# Patient Record
Sex: FEMALE | Race: WHITE | NOT HISPANIC OR LATINO | Employment: FULL TIME | URBAN - METROPOLITAN AREA
[De-identification: names, ages, dates, MRNs, and addresses within clinical notes are randomized per-mention and may not be internally consistent; named-entity substitution may affect disease eponyms.]

---

## 2017-01-26 ENCOUNTER — OFFICE VISIT (OUTPATIENT)
Dept: OBSTETRICS AND GYNECOLOGY | Facility: CLINIC | Age: 37
End: 2017-01-26
Payer: COMMERCIAL

## 2017-01-26 VITALS
BODY MASS INDEX: 25.34 KG/M2 | HEIGHT: 62 IN | WEIGHT: 137.69 LBS | SYSTOLIC BLOOD PRESSURE: 120 MMHG | DIASTOLIC BLOOD PRESSURE: 82 MMHG

## 2017-01-26 DIAGNOSIS — Z12.4 SCREENING FOR MALIGNANT NEOPLASM OF THE CERVIX: Primary | ICD-10-CM

## 2017-01-26 DIAGNOSIS — Z01.419 ENCOUNTER FOR GYNECOLOGICAL EXAMINATION: ICD-10-CM

## 2017-01-26 PROCEDURE — 99999 PR PBB SHADOW E&M-EST. PATIENT-LVL II: CPT | Mod: PBBFAC,,, | Performed by: OBSTETRICS & GYNECOLOGY

## 2017-01-26 PROCEDURE — 99395 PREV VISIT EST AGE 18-39: CPT | Mod: S$GLB,,, | Performed by: OBSTETRICS & GYNECOLOGY

## 2017-01-26 PROCEDURE — 87624 HPV HI-RISK TYP POOLED RSLT: CPT

## 2017-01-26 PROCEDURE — 88175 CYTOPATH C/V AUTO FLUID REDO: CPT

## 2017-01-26 NOTE — PROGRESS NOTES
"CC: Well woman exam    Karolyn Campbell is a 36 y.o. female  presents for a well woman exam.  She is established.  LMP: Patient's last menstrual period was 01/10/2017..   Patient without complaints.  Cycles are monthly and regular.  Using condoms for birth control  considering a vasectomy     Health Maintenance   Topic Date Due    Lipid Panel  1979    TETANUS VACCINE  1997    Pap Smear  2000    Influenza Vaccine  2016         Past Medical History   Diagnosis Date    Abnormal Pap smear of cervix      prior to being a pt     Allergy     Anxiety     Anxiety     HPV (human papilloma virus) infection     Hyperlipemia     Migraine     .  Having normal regular monthly periods.  Using condoms for birth control.    Past Surgical History   Procedure Laterality Date    Breast surgery      Rhinoplasty      Replacement of  breast implant        due to rupture      section         OB History    Para Term  AB SAB TAB Ectopic Multiple Living   2 2        2      # Outcome Date GA Lbr Germain/2nd Weight Sex Delivery Anes PTL Lv   2 Para     F CS-LTranv   Y   1 Para     M CS-LTranv   Y          Family History   Problem Relation Age of Onset    Adopted: Yes       Social History   Substance Use Topics    Smoking status: Former Smoker    Smokeless tobacco: None    Alcohol use Yes       Visit Vitals    /82    Ht 5' 2" (1.575 m)    Wt 62.5 kg (137 lb 10.8 oz)    LMP 01/10/2017    BMI 25.18 kg/m2         ROS:  GENERAL: Denies weight gain or weight loss. Feeling well overall.   SKIN: Denies rash or lesions.   HEAD: Denies head injury or headache.   NODES: Denies enlarged lymph nodes.   CHEST: Denies chest pain or shortness of breath.   CARDIOVASCULAR: Denies palpitations or left sided chest pain.   ABDOMEN: No abdominal pain, constipation, diarrhea, nausea, vomiting or rectal bleeding.   URINARY: No frequency, dysuria, hematuria, " or burning on urination.  REPRODUCTIVE: See HPI.   BREASTS: The patient performs breast self-examination and denies pain, lumps, or nipple discharge.   HEMATOLOGIC: No easy bruisability or excessive bleeding.  MUSCULOSKELETAL: Denies joint pain or swelling.   NEUROLOGIC: Denies syncope or weakness.   PSYCHIATRIC: Denies depression, anxiety or mood swings.    Physical Exam:    APPEARANCE: Well nourished, well developed, in no acute distress.  AFFECT: WNL, alert and oriented x 3  SKIN: No acne or hirsutism  ABDOMEN: Soft.  No tenderness or masses.  No hepatosplenomegaly.  No hernias.  BREASTS: Symmetrical, no skin changes or visible lesions.  No palpable masses, nipple discharge bilaterally.  PELVIC: Normal external genitalia without lesions.  Normal hair distribution.  Adequate perineal body, normal urethral meatus.  Vagina moist and well rugated without lesions or discharge.  Cervix pink, without lesions, discharge or tenderness.  No significant cystocele or rectocele.  Bimanual exam shows uterus to be normal size, regular, mobile and nontender.  Adnexa without masses or tenderness.    EXTREMITIES: No edema.    ASSESSMENT AND PLAN  1. Screening for malignant neoplasm of the cervix  Liquid-based pap smear, screening    HPV DNA probe, amplified   2. Encounter for gynecological examination   Pap smear done.         Patient was counseled today on A.C.S. Pap guidelines and recommendations for yearly pelvic exams, mammograms and monthly self breast exams; to see her PCP for other health maintenance.     Return in about 1 year (around 1/26/2018).

## 2017-01-26 NOTE — MR AVS SNAPSHOT
"    [facility]  [address]  [place] [address]  Phone: [phone]  Fax: [phone]                  Karolyn Campbell   2017 2:45 PM   Office Visit    Description:  Female : 1980   Provider:  Tobias Lubin MD   Department:  [facility]           Reason for Visit     Well Woman           Diagnoses this Visit        Comments    Screening for malignant neoplasm of the cervix    -  Primary     Encounter for gynecological examination                To Do List           Goals (5 Years of Data)     None      Follow-Up and Disposition     Return in about 1 year (around 2018).    Follow-up and Disposition History      [facility] On [facility]     [facility] On [facility] Care [facility] -  Assistance  Registered nurses in the [facility] provide clinical advisement, health education, appointment booking, and other advisory services.  Call for this free service at [phone].             Medications           Message regarding Medications     Verify the changes and/or additions to your medication regime listed below are the same as discussed with your clinician today.  If any of these changes or additions are incorrect, please notify your healthcare provider.        STOP taking these medications     buPROPion (WELLBUTRIN XL) 150 MG TB24 tablet Take 1 tablet (150 mg total) by mouth once daily.           Verify that the below list of medications is an accurate representation of the medications you are currently taking.  If none reported, the list may be blank. If incorrect, please contact your healthcare provider. Carry this list with you in case of emergency.           Current Medications     sertraline (ZOLOFT) 50 MG tablet Take 1 tablet (50 mg total) by mouth every evening.           Clinical Reference Information           Vital Signs - Last Recorded  Most recent update: 2017  3:13 PM by Bettye Flores MA    BP Ht Wt LMP BMI    120/82 5' 2" (1.575 m) " 62.5 kg (137 lb 10.8 oz) 01/10/2017 25.18 kg/m2      Blood Pressure          Most Recent Value    BP  120/82      Allergies as of 1/26/2017     Wellbutrin [Bupropion Hcl]      Immunizations Administered on Date of Encounter - 1/26/2017     None      Orders Placed During Today's Visit      Normal Orders This Visit    HPV DNA probe, amplified     Liquid-based pap smear, screening

## 2017-02-03 NOTE — PROGRESS NOTES
Pap is normal however HPV test is still pending.  The HPV portion of this test may take up to 2 weeks to return.  Resulted via portal

## 2017-02-06 LAB — HUMAN PAPILLOMAVIRUS (HPV): NOT DETECTED

## 2017-07-03 ENCOUNTER — OFFICE VISIT (OUTPATIENT)
Dept: DERMATOLOGY | Facility: CLINIC | Age: 37
End: 2017-07-03
Payer: COMMERCIAL

## 2017-07-03 DIAGNOSIS — L81.4 LENTIGO: ICD-10-CM

## 2017-07-03 DIAGNOSIS — L82.1 SK (SEBORRHEIC KERATOSIS): ICD-10-CM

## 2017-07-03 DIAGNOSIS — D48.9 NEOPLASM OF UNCERTAIN BEHAVIOR: ICD-10-CM

## 2017-07-03 DIAGNOSIS — L21.9 SEBORRHEIC DERMATITIS, UNSPECIFIED: Primary | ICD-10-CM

## 2017-07-03 DIAGNOSIS — D22.9 NEVUS: ICD-10-CM

## 2017-07-03 PROCEDURE — 99999 PR PBB SHADOW E&M-EST. PATIENT-LVL II: CPT | Mod: PBBFAC,,, | Performed by: DERMATOLOGY

## 2017-07-03 PROCEDURE — 99203 OFFICE O/P NEW LOW 30 MIN: CPT | Mod: 25,S$GLB,, | Performed by: DERMATOLOGY

## 2017-07-03 PROCEDURE — 88305 TISSUE EXAM BY PATHOLOGIST: CPT | Performed by: PATHOLOGY

## 2017-07-03 PROCEDURE — 11100 PR BIOPSY OF SKIN LESION: CPT | Mod: S$GLB,,, | Performed by: DERMATOLOGY

## 2017-07-03 RX ORDER — KETOCONAZOLE 20 MG/ML
SHAMPOO, SUSPENSION TOPICAL
Qty: 120 ML | Refills: 5 | Status: SHIPPED | OUTPATIENT
Start: 2017-07-03 | End: 2018-04-04

## 2017-07-03 RX ORDER — FLUOCINONIDE TOPICAL SOLUTION USP, 0.05% 0.5 MG/ML
SOLUTION TOPICAL
Qty: 60 ML | Refills: 3 | Status: SHIPPED | OUTPATIENT
Start: 2017-07-03 | End: 2018-04-04

## 2017-07-03 NOTE — PROGRESS NOTES
"  Subjective:       Patient ID:  Karolyn Campbell is a 36 y.o. female who presents for   Chief Complaint   Patient presents with    Skin Check     Patient is here today for a "mole" check.   Pt has a history of  significant sun exposure in the past.   Pt recalls several blistering sunburns in the past- yes  Pt has history of tanning bed use- yes, not currently  Pt has  had moles removed in the past- yes, benign  Pt has history of melanoma in first degree relatives-  Adopted    Pt c/o moles all over and is concerned about them bc of her tanning bed use. Moles started after she had children. Now minimal sun exposure. Pt has mole on her back that is large. Not bleeding or tender. No tx.         Review of Systems   Constitutional: Negative for fever, chills, weight loss, weight gain, fatigue, night sweats and malaise.   Skin: Positive for daily sunscreen use. Negative for activity-related sunscreen use and recent sunburn.   Hematologic/Lymphatic: Does not bruise/bleed easily.        Objective:    Physical Exam   Constitutional: She appears well-developed and well-nourished. No distress.   Neurological: She is alert and oriented to person, place, and time. She is not disoriented.   Psychiatric: She has a normal mood and affect.   Skin:   Areas Examined (abnormalities noted in diagram):   Scalp / Hair Palpated and Inspected  Head / Face Inspection Performed  Neck Inspection Performed  Chest / Axilla Inspection Performed  Abdomen Inspection Performed  Genitals / Buttocks / Groin Inspection Performed  Back Inspection Performed  RUE Inspected  LUE Inspection Performed  RLE Inspected  LLE Inspection Performed  Nails and Digits Inspection Performed                   Diagram Legend     Erythematous scaling macule/papule c/w actinic keratosis       Vascular papule c/w angioma      Pigmented verrucoid papule/plaque c/w seborrheic keratosis      Yellow umbilicated papule c/w sebaceous hyperplasia      Irregularly shaped tan macule " c/w lentigo     1-2 mm smooth white papules consistent with Milia      Movable subcutaneous cyst with punctum c/w epidermal inclusion cyst      Subcutaneous movable cyst c/w pilar cyst      Firm pink to brown papule c/w dermatofibroma      Pedunculated fleshy papule(s) c/w skin tag(s)      Evenly pigmented macule c/w junctional nevus     Mildly variegated pigmented, slightly irregular-bordered macule c/w mildly atypical nevus      Flesh colored to evenly pigmented papule c/w intradermal nevus       Pink pearly papule/plaque c/w basal cell carcinoma      Erythematous hyperkeratotic cursted plaque c/w SCC      Surgical scar with no sign of skin cancer recurrence      Open and closed comedones      Inflammatory papules and pustules      Verrucoid papule consistent consistent with wart     Erythematous eczematous patches and plaques     Dystrophic onycholytic nail with subungual debris c/w onychomycosis     Umbilicated papule    Erythematous-base heme-crusted tan verrucoid plaque consistent with inflamed seborrheic keratosis     Erythematous Silvery Scaling Plaque c/w Psoriasis     See annotation      Assessment / Plan:      Pathology Orders:      Normal Orders This Visit    Tissue Specimen To Pathology, Dermatology     Questions:    Directional Terms:  Other(comment)    Clinical information:  r/o inflamed nevus    Specific Site:  left upper back        Seborrheic dermatitis, unspecified  -     fluocinonide (LIDEX) 0.05 % external solution; AAA scalp qd - bid prn pruritus  Dispense: 60 mL; Refill: 3  -     ketoconazole (NIZORAL) 2 % shampoo; Wash hair with medicated shampoo at least 2x/week - let sit on scalp at least 5 minutes prior to rinsing  Dispense: 120 mL; Refill: 5    SK (seborrheic keratosis)  These are benign inherited growths without a malignant potential. Reassurance given to patient. No treatment is necessary.     Lentigo  This is a benign hyperpigmented sun induced lesion. Daily sun protection will reduce  the number of new lesions. Treatment of these benign lesions are considered cosmetic.  The nature of sun-induced photo-aging and skin cancers is discussed.  Sun avoidance, protective clothing, and the use of 30-SPF sunscreens is advised. Observe closely for skin damage/changes, and call if such occurs.    Nevus  Discussed ABCDE's of nevi.  Monitor for new mole or moles that are becoming bigger, darker, irritated, or developing irregular borders. Brochure provided.    Neoplasm of uncertain behavior  Shave biopsy procedure note:    Shave biopsy performed after verbal consent including risk of infection, scar, recurrence, need for additional treatment of site. Area prepped with alcohol, anesthetized with approximately 1.0cc of 1% lidocaine with epinephrine. Lesional tissue shaved with razor blade. Hemostasis achieved with application of aluminum chloride followed by hyfrecation. No complications. Dressing applied. Wound care explained.      -     Tissue Specimen To Pathology, Dermatology    Total body skin examination performed today including at least 12 points as noted in physical examination. No lesions suspicious for malignancy noted.    Pt intersted in having 3mm mole removed from right cheek.  Discussed would do punch bx          Return in about 1 year (around 7/3/2018) for prn bx report. recheck nevus mid upper gluteal cleft

## 2017-07-03 NOTE — LETTER
July 3, 2017      Jack Herrera Jr., MD  [address] [facility] [place] [address]           [facility]  [address] [facility] [place] [address]  Phone: [phone]  Fax: [phone]          Patient: Karolny Campbell   MR Number: [id number]   YOB: 1980   Date of Visit: 7/3/2017       Dear Dr. Jack Herrera Jr.:    Thank you for referring Karolyn Campbell to me for evaluation. Attached you will find relevant portions of my assessment and plan of care.    If you have questions, please do not hesitate to call me. I look forward to following Karolyn Campbell along with you.    Sincerely,    Tamara Mary MD    Enclosure  CC:  No Recipients    If you would like to receive this communication electronically, please contact [email or web address] or [phone] to request more information on [employer] access.    For providers and/or their staff who would like to refer a patient to [facility], please contact us through our one-stop-shop provider referral line, [facility], at [phone].    If you feel you have received this communication in error or would no longer like to receive these types of communications, please e-mail [email or web address]

## 2017-07-07 ENCOUNTER — PATIENT MESSAGE (OUTPATIENT)
Dept: DERMATOLOGY | Facility: CLINIC | Age: 37
End: 2017-07-07

## 2017-12-27 ENCOUNTER — OFFICE VISIT (OUTPATIENT)
Dept: DERMATOLOGY | Facility: CLINIC | Age: 37
End: 2017-12-27
Payer: COMMERCIAL

## 2017-12-27 DIAGNOSIS — D22.9 NEVUS: ICD-10-CM

## 2017-12-27 DIAGNOSIS — L81.4 LENTIGO: ICD-10-CM

## 2017-12-27 DIAGNOSIS — L70.0 ACNE VULGARIS: Primary | ICD-10-CM

## 2017-12-27 PROCEDURE — 99999 PR PBB SHADOW E&M-EST. PATIENT-LVL II: CPT | Mod: PBBFAC,,, | Performed by: DERMATOLOGY

## 2017-12-27 PROCEDURE — 99213 OFFICE O/P EST LOW 20 MIN: CPT | Mod: S$GLB,,, | Performed by: DERMATOLOGY

## 2017-12-27 RX ORDER — TRETINOIN 0.25 MG/G
CREAM TOPICAL
Qty: 45 G | Refills: 6 | Status: SHIPPED | OUTPATIENT
Start: 2017-12-27 | End: 2019-07-18 | Stop reason: SINTOL

## 2017-12-27 RX ORDER — DAPSONE 75 MG/G
GEL TOPICAL
Qty: 60 G | Refills: 2 | Status: SHIPPED | OUTPATIENT
Start: 2017-12-27 | End: 2018-04-04

## 2017-12-27 NOTE — PROGRESS NOTES
Subjective:       Patient ID:  Karolyn Campbell is a 37 y.o. female who presents for   Chief Complaint   Patient presents with    Acne     Pt c/o acne on face x a few years. No prev tx. Worse with menses.    Pt also request a UBSE. Has many moles and last visit had mole on upper buttock to be rechecked.  No changes noted per patient       Acne         Review of Systems   HENT: Negative for nosebleeds and headaches.    Gastrointestinal: Negative for diarrhea and Sensitivity to oral antibiotics.   Genitourinary: Negative for irregular periods.   Musculoskeletal: Negative for arthralgias.   Skin: Positive for daily sunscreen use and activity-related sunscreen use. Negative for recent sunburn.   Neurological: Negative for headaches.   Psychiatric/Behavioral: Negative for depressed mood.        Objective:    Physical Exam   Constitutional: She appears well-developed and well-nourished. No distress.   Neurological: She is alert and oriented to person, place, and time. She is not disoriented.   Psychiatric: She has a normal mood and affect.   Skin:   Areas Examined (abnormalities noted in diagram):   Scalp / Hair Palpated and Inspected  Head / Face Inspection Performed  Neck Inspection Performed  Chest / Axilla Inspection Performed  Abdomen Inspection Performed  Back Inspection Performed  RUE Inspected  LUE Inspection Performed  Nails and Digits Inspection Performed                   Diagram Legend     Erythematous scaling macule/papule c/w actinic keratosis       Vascular papule c/w angioma      Pigmented verrucoid papule/plaque c/w seborrheic keratosis      Yellow umbilicated papule c/w sebaceous hyperplasia      Irregularly shaped tan macule c/w lentigo     1-2 mm smooth white papules consistent with Milia      Movable subcutaneous cyst with punctum c/w epidermal inclusion cyst      Subcutaneous movable cyst c/w pilar cyst      Firm pink to brown papule c/w dermatofibroma      Pedunculated fleshy papule(s) c/w skin  tag(s)      Evenly pigmented macule c/w junctional nevus     Mildly variegated pigmented, slightly irregular-bordered macule c/w mildly atypical nevus      Flesh colored to evenly pigmented papule c/w intradermal nevus       Pink pearly papule/plaque c/w basal cell carcinoma      Erythematous hyperkeratotic cursted plaque c/w SCC      Surgical scar with no sign of skin cancer recurrence      Open and closed comedones      Inflammatory papules and pustules      Verrucoid papule consistent consistent with wart     Erythematous eczematous patches and plaques     Dystrophic onycholytic nail with subungual debris c/w onychomycosis     Umbilicated papule    Erythematous-base heme-crusted tan verrucoid plaque consistent with inflamed seborrheic keratosis     Erythematous Silvery Scaling Plaque c/w Psoriasis     See annotation      Assessment / Plan:        Acne vulgaris  SKin medica cleanser  -     dapsone (ACZONE) 7.5 % GlwP; Apply to affected area qam  Dispense: 60 g; Refill: 2  -     tretinoin (RETIN-A) 0.025 % cream; Apply thin film to face qhs then moisturize  Dispense: 45 g; Refill: 6    Lentigo  This is a benign hyperpigmented sun induced lesion. Daily sun protection will reduce the number of new lesions. Treatment of these benign lesions are considered cosmetic.  The nature of sun-induced photo-aging and skin cancers is discussed.  Sun avoidance, protective clothing, and the use of 30-SPF sunscreens is advised. Observe closely for skin damage/changes, and call if such occurs.    Nevus    Discussed ABCDE's of nevi.  Monitor for new mole or moles that are becoming bigger, darker, irritated, or developing irregular borders. Brochure provided.      Upper body skin examination performed today including at least 6 points as noted in physical examination. No lesions suspicious for malignancy noted.           Return in about 1 year (around 12/27/2018).

## 2018-01-05 ENCOUNTER — PATIENT MESSAGE (OUTPATIENT)
Dept: FAMILY MEDICINE | Facility: CLINIC | Age: 38
End: 2018-01-05

## 2018-01-08 ENCOUNTER — PATIENT MESSAGE (OUTPATIENT)
Dept: FAMILY MEDICINE | Facility: CLINIC | Age: 38
End: 2018-01-08

## 2018-01-08 RX ORDER — SERTRALINE HYDROCHLORIDE 50 MG/1
50 TABLET, FILM COATED ORAL NIGHTLY
Qty: 30 TABLET | Refills: 11 | Status: SHIPPED | OUTPATIENT
Start: 2018-01-08 | End: 2018-04-04

## 2018-04-04 ENCOUNTER — OFFICE VISIT (OUTPATIENT)
Dept: OBSTETRICS AND GYNECOLOGY | Facility: CLINIC | Age: 38
End: 2018-04-04
Attending: OBSTETRICS & GYNECOLOGY
Payer: COMMERCIAL

## 2018-04-04 VITALS
DIASTOLIC BLOOD PRESSURE: 80 MMHG | WEIGHT: 125.69 LBS | HEIGHT: 62 IN | BODY MASS INDEX: 23.13 KG/M2 | SYSTOLIC BLOOD PRESSURE: 116 MMHG

## 2018-04-04 DIAGNOSIS — Z12.39 BREAST CANCER SCREENING: ICD-10-CM

## 2018-04-04 DIAGNOSIS — Z01.419 ENCOUNTER FOR GYNECOLOGICAL EXAMINATION: Primary | ICD-10-CM

## 2018-04-04 PROCEDURE — 99999 PR PBB SHADOW E&M-EST. PATIENT-LVL III: CPT | Mod: PBBFAC,,, | Performed by: OBSTETRICS & GYNECOLOGY

## 2018-04-04 PROCEDURE — 99395 PREV VISIT EST AGE 18-39: CPT | Mod: S$GLB,,, | Performed by: OBSTETRICS & GYNECOLOGY

## 2018-04-04 NOTE — PROGRESS NOTES
"CC: Well woman exam    Karolyn Campbell is a 37 y.o. female  presents for a well woman exam.  She is established.  LMP: Patient's last menstrual period was 2018..   Annual Exam,  Contraception Condoms-  to get vasectomy  Cycles are  Reg and monthly   last pap - pap & hpv negative,   Baseline mammo [facility] birads 2. Rec repeat as has dense breasts and implants     Health Maintenance   Topic Date Due    Lipid Panel  1979    TETANUS VACCINE  1997    Influenza Vaccine  2017    Pap Smear with HPV Cotest  2020         Past Medical History:   Diagnosis Date    Abnormal Pap smear of cervix     prior to being a pt     Allergy     Anxiety     Anxiety     HPV (human papilloma virus) infection     Hyperlipemia     Migraine        Past Surgical History:   Procedure Laterality Date    BREAST SURGERY       SECTION      replacement of  breast implant       due to rupture     RHINOPLASTY         OB History    Para Term  AB Living   2 2       2   SAB TAB Ectopic Multiple Live Births           2      # Outcome Date GA Lbr Germain/2nd Weight Sex Delivery Anes PTL Lv   2 Para     F CS-LTranv   MAX   1 Para 2010    M CS-LTranv   MAX          Family History   Problem Relation Age of Onset    Adopted: Yes       Social History   Substance Use Topics    Smoking status: Former Smoker    Smokeless tobacco: Never Used    Alcohol use Yes       /80   Ht 5' 2" (1.575 m)   Wt 57 kg (125 lb 10.6 oz)   LMP 2018   BMI 22.98 kg/m²       ROS:  GENERAL: Denies weight gain or weight loss. Feeling well overall.   SKIN: Denies rash or lesions.   HEAD: Denies head injury or headache.   NODES: Denies enlarged lymph nodes.   CHEST: Denies chest pain or shortness of breath.   CARDIOVASCULAR: Denies palpitations or left sided chest pain.   ABDOMEN: No abdominal pain, constipation, diarrhea, nausea, vomiting or rectal bleeding. "   URINARY: No frequency, dysuria, hematuria, or burning on urination.    Physical Exam:    APPEARANCE: Well nourished, well developed, in no acute distress.  AFFECT: WNL, alert and oriented x 3  SKIN: No acne or hirsutism  ABDOMEN: Soft.  No tenderness or masses.  No hepatosplenomegaly.  No hernias.  BREASTS: Symmetrical, no skin changes or visible lesions.  No palpable masses, nipple discharge bilaterally.  PELVIC: Normal external genitalia without lesions.  Normal hair distribution.  Adequate perineal body, normal urethral meatus.  Vagina moist and well rugated without lesions or discharge.  Cervix pink, without lesions, discharge or tenderness.  No significant cystocele or rectocele.  Bimanual exam shows uterus to be normal size, regular, mobile and nontender.  Adnexa without masses or tenderness.    EXTREMITIES: No edema.    ASSESSMENT AND PLAN  1. Encounter for gynecological examination  Normal exam   2. Breast cancer screening  Mammo Digital Screening Bilat with Tomosynthesis CAD       Patient was counseled today on A.C.S. Pap guidelines and recommendations for yearly pelvic exams, mammograms and monthly self breast exams; to see her PCP for other health maintenance.     Follow-up in about 1 year (around 4/4/2019).

## 2018-05-15 ENCOUNTER — OFFICE VISIT (OUTPATIENT)
Dept: FAMILY MEDICINE | Facility: CLINIC | Age: 38
End: 2018-05-15
Payer: COMMERCIAL

## 2018-05-15 VITALS
BODY MASS INDEX: 23.55 KG/M2 | SYSTOLIC BLOOD PRESSURE: 110 MMHG | OXYGEN SATURATION: 99 % | HEIGHT: 62 IN | WEIGHT: 128 LBS | DIASTOLIC BLOOD PRESSURE: 78 MMHG | TEMPERATURE: 99 F | HEART RATE: 72 BPM | RESPIRATION RATE: 18 BRPM

## 2018-05-15 DIAGNOSIS — Z00.00 ANNUAL PHYSICAL EXAM: Primary | ICD-10-CM

## 2018-05-15 PROCEDURE — 99999 PR PBB SHADOW E&M-EST. PATIENT-LVL III: CPT | Mod: PBBFAC,,, | Performed by: FAMILY MEDICINE

## 2018-05-15 PROCEDURE — 99395 PREV VISIT EST AGE 18-39: CPT | Mod: S$GLB,,, | Performed by: FAMILY MEDICINE

## 2018-05-15 NOTE — PROGRESS NOTES
HPI:  Karolyn Campbell is a 37 y.o. year old female that  presents to become established and have her annual exam.She was a pt of Dr Herrera.She denies any current problems.s  Chief Complaint   Patient presents with    Annual Exam    Establish Care   .     HPI    Past Medical History:   Diagnosis Date    Abnormal Pap smear of cervix     prior to being a pt     Allergy     Anxiety     Anxiety     HPV (human papilloma virus) infection     Hyperlipemia     Migraine      Social History     Social History    Marital status:      Spouse name: N/A    Number of children: N/A    Years of education: N/A     Occupational History    Not on file.     Social History Main Topics    Smoking status: Former Smoker    Smokeless tobacco: Never Used    Alcohol use Yes    Drug use: No    Sexual activity: Yes     Partners: Male     Birth control/ protection: Condom     Other Topics Concern    Are You Pregnant Or Think You May Be? No    Breast-Feeding No     Social History Narrative    Lives with her  in [place] with their 2 children. She is a  at [employer]. Her oldest sister was molested as a child. Her sister has alcohol and psychological problems. Her father is . She helps to care for her mother. She is spiritual. She does Elizabeth and yoga. She drinks one cup of coffee a day.      Past Surgical History:   Procedure Laterality Date    BREAST SURGERY       SECTION      replacement of  breast implant       due to rupture     RHINOPLASTY  2007     Family History   Problem Relation Age of Onset    Adopted: Yes    No Known Problems Daughter     ADD / ADHD Son     Strabismus Son            Review of Systems  General ROS: negative for chills, fever or weight loss  Psychological ROS: negative for hallucination, depression or suicidal ideation  Ophthalmic ROS: negative for blurry vision, photophobia or eye pain  ENT ROS: negative for epistaxis, sore throat  "or rhinorrhea  Respiratory ROS: no cough, shortness of breath, or wheezing  Cardiovascular ROS: no chest pain or dyspnea on exertion  Gastrointestinal ROS: no abdominal pain, change in bowel habits, or black/ bloody stools  Genito-Urinary ROS: no dysuria, trouble voiding, or hematuria  Musculoskeletal ROS: negative for gait disturbance or muscular weakness  Neurological ROS: no syncope or seizures; no ataxia  Dermatological ROS: negative for pruritis, rash and jaundice      Physical Exam:  /78 (BP Location: Right arm, Patient Position: Sitting, BP Method: Small (Manual))   Pulse 72   Temp 98.5 °F (36.9 °C) (Oral)   Resp 18   Ht 5' 2" (1.575 m)   Wt 58.1 kg (128 lb)   LMP 05/07/2018 (Exact Date)   SpO2 99%   BMI 23.41 kg/m²   General appearance: alert, cooperative, no distress  Constitutional:Oriented to person, place, and time.appears well-developed and well-nourished.  HEENT: Normocephalic, atraumatic, neck symmetrical, no nasal discharge, TM - clear bilaterally   Eyes: conjunctivae/corneas clear, PERRL, EOM's intact  Lungs: clear to auscultation bilaterally, no dullness to percussion bilaterally  Heart: regular rate and rhythm without rub; no displacement of the PMI   Abdomen: soft, non-tender; bowel sounds normoactive; no organomegaly  Extremities: extremities symmetric; no clubbing, cyanosis, or edema  Integument: Skin color, texture, turgor normal; no rashes; hair distrubution normal  Neurologic: Alert and oriented X 3, normal strength, normal coordination and gait  Psychiatric: no pressured speech; normal affect; no evidence of impaired cognition   Physical Exam  LABS:    Complete Blood Count  No results found for: RBC, HGB, HCT, MCV, MCH, MCHC, RDW, PLT, MPV, GRAN, LYMPH, MONO, EOS, BASO, GRAN, LYMPH, MONO, EOSINOPHIL, BASOPHIL, DIFFMETHOD    Comprehensive Metabolic Panel  No results found for: GLU, BUN, CREATININE, NA, K, CL, PROT, ALBUMIN, BILITOT, AST, ALKPHOS, CO2, ALT, ANIONGAP, EGFRNONAA, " ESTGFRAFRICA    LIPID  No results found for: CHOL, HDL    TSH  No results found for: TSH    Current Outpatient Prescriptions   Medication Sig Dispense Refill    tretinoin (RETIN-A) 0.025 % cream Apply thin film to face qhs then moisturize 45 g 6     No current facility-administered medications for this visit.        Assessment:    ICD-10-CM ICD-9-CM    1. Annual physical exam Z00.00 V70.0 Comprehensive metabolic panel      Lipid panel      CBC auto differential      TSH         Plan:    Follow-up in 2 weeks (on 5/29/2018).          Linda Pineda MD  Answers for HPI/ROS submitted by the patient on 5/14/2018   activity change: No  unexpected weight change: No  neck pain: Yes  hearing loss: No  rhinorrhea: No  trouble swallowing: No  eye discharge: No  visual disturbance: No  chest tightness: No  wheezing: No  chest pain: No  palpitations: No  blood in stool: No  constipation: No  vomiting: No  diarrhea: No  polydipsia: No  polyuria: No  difficulty urinating: No  hematuria: No  menstrual problem: No  dysuria: No  joint swelling: No  arthralgias: No  headaches: No  weakness: No  confusion: No  dysphoric mood: No

## 2018-05-28 ENCOUNTER — OFFICE VISIT (OUTPATIENT)
Dept: FAMILY MEDICINE | Facility: CLINIC | Age: 38
End: 2018-05-28
Payer: COMMERCIAL

## 2018-05-28 VITALS
DIASTOLIC BLOOD PRESSURE: 64 MMHG | OXYGEN SATURATION: 99 % | WEIGHT: 126 LBS | HEIGHT: 62 IN | TEMPERATURE: 99 F | BODY MASS INDEX: 23.19 KG/M2 | SYSTOLIC BLOOD PRESSURE: 98 MMHG | RESPIRATION RATE: 18 BRPM | HEART RATE: 72 BPM

## 2018-05-28 DIAGNOSIS — F98.8 ATTENTION DEFICIT DISORDER (ADD) IN ADULT: Primary | ICD-10-CM

## 2018-05-28 PROCEDURE — 3008F BODY MASS INDEX DOCD: CPT | Mod: CPTII,S$GLB,, | Performed by: FAMILY MEDICINE

## 2018-05-28 PROCEDURE — 99999 PR PBB SHADOW E&M-EST. PATIENT-LVL III: CPT | Mod: PBBFAC,,, | Performed by: FAMILY MEDICINE

## 2018-05-28 PROCEDURE — 99214 OFFICE O/P EST MOD 30 MIN: CPT | Mod: S$GLB,,, | Performed by: FAMILY MEDICINE

## 2018-05-28 RX ORDER — DEXTROAMPHETAMINE SACCHARATE, AMPHETAMINE ASPARTATE MONOHYDRATE, DEXTROAMPHETAMINE SULFATE AND AMPHETAMINE SULFATE 2.5; 2.5; 2.5; 2.5 MG/1; MG/1; MG/1; MG/1
10 CAPSULE, EXTENDED RELEASE ORAL EVERY MORNING
Qty: 14 CAPSULE | Refills: 0 | Status: SHIPPED | OUTPATIENT
Start: 2018-05-28 | End: 2018-06-08 | Stop reason: DRUGHIGH

## 2018-05-28 NOTE — PROGRESS NOTES
HPI:  Karolyn Campbell is a 37 y.o. year old female that  presents for lab f/u. She states that she has been trying to use green tea as a caffeine source rather than coffee. She is still acknowledging that she is struggling with her lack of attention that is not really been improved with the caffeine intake.Her son is currently doing well on his medication for ADHD  Chief Complaint   Patient presents with    Follow-up     lab results   .     HPI      Past Medical History:   Diagnosis Date    Abnormal Pap smear of cervix     prior to being a pt     Allergy     Anxiety     Anxiety     HPV (human papilloma virus) infection     Hyperlipemia     Migraine      Social History     Social History    Marital status:      Spouse name: N/A    Number of children: N/A    Years of education: N/A     Occupational History    Not on file.     Social History Main Topics    Smoking status: Former Smoker    Smokeless tobacco: Never Used    Alcohol use Yes    Drug use: No    Sexual activity: Yes     Partners: Male     Birth control/ protection: Condom     Other Topics Concern    Are You Pregnant Or Think You May Be? No    Breast-Feeding No     Social History Narrative    Lives with her  in [place] with their 2 children. She is a  at [employer]. Her oldest sister was molested as a child. Her sister has alcohol and psychological problems. Her father is . She helps to care for her mother. She is spiritual. She does Elizabeth and yoga. She drinks one cup of coffee a day.      Past Surgical History:   Procedure Laterality Date    BREAST SURGERY       SECTION      replacement of  breast implant       due to rupture     RHINOPLASTY  2007     Family History   Problem Relation Age of Onset    Adopted: Yes    No Known Problems Daughter     ADD / ADHD Son     Strabismus Son            Review of Systems  General ROS: negative for chills, fever or weight loss  ENT  "ROS: negative for epistaxis, sore throat or rhinorrhea  Respiratory ROS: no cough, shortness of breath, or wheezing  Cardiovascular ROS: no chest pain or dyspnea on exertion  Gastrointestinal ROS: no abdominal pain, change in bowel habits, or black/ bloody stools    Physical Exam:  BP 98/64 (BP Location: Right arm, Patient Position: Sitting, BP Method: Medium (Manual))   Pulse 72   Temp 98.7 °F (37.1 °C) (Oral)   Resp 18   Ht 5' 2" (1.575 m)   Wt 57.2 kg (126 lb)   LMP 05/07/2018 (Exact Date)   SpO2 99%   BMI 23.05 kg/m²   General appearance: alert, cooperative, no distress  Constitutional:Oriented to person, place, and time.appears well-developed and well-nourished.  Lungs: clear to auscultation bilaterally, no dullness to percussion bilaterally  Heart: regular rate and rhythm without rub; no displacement of the PMI , S1&S2 present    Physical Exam    LABS:    Complete Blood Count  Lab Results   Component Value Date    RBC 4.72 05/23/2018    HGB 14.1 05/23/2018    HCT 43.3 05/23/2018    MCV 92 05/23/2018    MCH 29.9 05/23/2018    MCHC 32.6 05/23/2018    RDW 12.5 05/23/2018     05/23/2018    MPV 10.0 05/23/2018    GRAN 2.5 05/23/2018    GRAN 55.3 05/23/2018    LYMPH 1.4 05/23/2018    LYMPH 31.5 05/23/2018    MONO 0.4 05/23/2018    MONO 9.4 05/23/2018    EOS 0.2 05/23/2018    BASO 0.02 05/23/2018    EOSINOPHIL 3.4 05/23/2018    BASOPHIL 0.4 05/23/2018    DIFFMETHOD Automated 05/23/2018       Comprehensive Metabolic Panel  Lab Results   Component Value Date     05/23/2018    BUN 24 (H) 05/23/2018    CREATININE 0.62 05/23/2018     05/23/2018    K 4.9 05/23/2018     05/23/2018    PROT 7.6 05/23/2018    ALBUMIN 4.4 05/23/2018    BILITOT 0.2 05/23/2018    AST 25 05/23/2018    ALKPHOS 52 05/23/2018    CO2 26 05/23/2018    ALT 28 05/23/2018    ANIONGAP 10 05/23/2018    EGFRNONAA >60.0 05/23/2018    ESTGFRAFRICA >60.0 05/23/2018       LIPID  Lab Results   Component Value Date    CHOL 174 " 05/23/2018    HDL 63 05/23/2018         TSH  Lab Results   Component Value Date    TSH 1.930 05/23/2018       Current Outpatient Prescriptions   Medication Sig Dispense Refill    tretinoin (RETIN-A) 0.025 % cream Apply thin film to face qhs then moisturize 45 g 6    dextroamphetamine-amphetamine (ADDERALL XR) 10 MG 24 hr capsule Take 1 capsule (10 mg total) by mouth every morning. 14 capsule 0     No current facility-administered medications for this visit.        Assessment:    ICD-10-CM ICD-9-CM    1. Attention deficit disorder (ADD) in adult F98.8 314.00 dextroamphetamine-amphetamine (ADDERALL XR) 10 MG 24 hr capsule         Plan:  Will give a trial of Adderall to see if this helps pt with attention and performance at work.  Follow-up in about 2 weeks (around 6/11/2018).          Linda Pineda MD

## 2018-06-08 ENCOUNTER — OFFICE VISIT (OUTPATIENT)
Dept: FAMILY MEDICINE | Facility: CLINIC | Age: 38
End: 2018-06-08
Payer: COMMERCIAL

## 2018-06-08 VITALS
WEIGHT: 125.44 LBS | DIASTOLIC BLOOD PRESSURE: 78 MMHG | SYSTOLIC BLOOD PRESSURE: 108 MMHG | RESPIRATION RATE: 18 BRPM | TEMPERATURE: 99 F | OXYGEN SATURATION: 100 % | HEART RATE: 67 BPM | BODY MASS INDEX: 23.08 KG/M2 | HEIGHT: 62 IN

## 2018-06-08 DIAGNOSIS — F98.8 ATTENTION DEFICIT DISORDER (ADD) IN ADULT: Primary | ICD-10-CM

## 2018-06-08 PROCEDURE — 99999 PR PBB SHADOW E&M-EST. PATIENT-LVL IV: CPT | Mod: PBBFAC,,, | Performed by: FAMILY MEDICINE

## 2018-06-08 PROCEDURE — 99214 OFFICE O/P EST MOD 30 MIN: CPT | Mod: S$GLB,,, | Performed by: FAMILY MEDICINE

## 2018-06-08 PROCEDURE — 3008F BODY MASS INDEX DOCD: CPT | Mod: CPTII,S$GLB,, | Performed by: FAMILY MEDICINE

## 2018-06-08 RX ORDER — DEXTROAMPHETAMINE SACCHARATE, AMPHETAMINE ASPARTATE MONOHYDRATE, DEXTROAMPHETAMINE SULFATE AND AMPHETAMINE SULFATE 3.75; 3.75; 3.75; 3.75 MG/1; MG/1; MG/1; MG/1
15 CAPSULE, EXTENDED RELEASE ORAL EVERY MORNING
Qty: 30 CAPSULE | Refills: 0 | Status: SHIPPED | OUTPATIENT
Start: 2018-06-08 | End: 2018-08-28

## 2018-06-08 NOTE — PROGRESS NOTES
HPI:  Karolyn Campbell is a 37 y.o. year old female that  Presents fro f/u of new prescription for Adderall. She states that she has been doing better since stating the Adderall . She states that it is taking theplace of the coffee that she was taking. She states that it does not control all of the concentration issues. She has not had any side effects.  Chief Complaint   Patient presents with    Follow-up     Adderall f/u--pt states she has had some small improvements   .     HPI      Past Medical History:   Diagnosis Date    Abnormal Pap smear of cervix     prior to being a pt     Allergy     Anxiety     Anxiety     HPV (human papilloma virus) infection     Hyperlipemia     Migraine      Social History     Social History    Marital status:      Spouse name: N/A    Number of children: N/A    Years of education: N/A     Occupational History    Not on file.     Social History Main Topics    Smoking status: Former Smoker    Smokeless tobacco: Never Used    Alcohol use Yes    Drug use: No    Sexual activity: Yes     Partners: Male     Birth control/ protection: Condom     Other Topics Concern    Are You Pregnant Or Think You May Be? No    Breast-Feeding No     Social History Narrative    Lives with her  in [place] with their 2 children. She is a  at [employer]. Her oldest sister was molested as a child. Her sister has alcohol and psychological problems. Her father is . She helps to care for her mother. She is spiritual. She does Elizabeth and yoga. She drinks one cup of coffee a day.      Past Surgical History:   Procedure Laterality Date    BREAST SURGERY       SECTION      replacement of  breast implant       due to rupture     RHINOPLASTY  2007     Family History   Problem Relation Age of Onset    Adopted: Yes    No Known Problems Daughter     ADD / ADHD Son     Strabismus Son            Review of Systems  General ROS: negative for  "chills, fever or weight loss  ENT ROS: negative for epistaxis, sore throat or rhinorrhea  Respiratory ROS: no cough, shortness of breath, or wheezing  Cardiovascular ROS: no chest pain or dyspnea on exertion  Gastrointestinal ROS: no abdominal pain, change in bowel habits, or black/ bloody stools    Physical Exam:  /78   Pulse 67   Temp 98.8 °F (37.1 °C) (Oral)   Resp 18   Ht 5' 2" (1.575 m)   Wt 56.9 kg (125 lb 7.1 oz)   LMP 06/03/2018 (Exact Date)   SpO2 100%   BMI 22.94 kg/m²   General appearance: alert, cooperative, no distress  Constitutional:Oriented to person, place, and time.appears well-developed and well-nourished.  HEENT: Normocephalic, atraumatic, neck symmetrical, no nasal discharge, TM- clear bilaterally  Lungs: clear to auscultation bilaterally, no dullness to percussion bilaterally  Heart: regular rate and rhythm without rub; no displacement of the PMI , S1&S2 present  Abdomen: soft, non-tender; bowel sounds normoactive; no organomegaly  Physical Exam    LABS:    Complete Blood Count  Lab Results   Component Value Date    RBC 4.72 05/23/2018    HGB 14.1 05/23/2018    HCT 43.3 05/23/2018    MCV 92 05/23/2018    MCH 29.9 05/23/2018    MCHC 32.6 05/23/2018    RDW 12.5 05/23/2018     05/23/2018    MPV 10.0 05/23/2018    GRAN 2.5 05/23/2018    GRAN 55.3 05/23/2018    LYMPH 1.4 05/23/2018    LYMPH 31.5 05/23/2018    MONO 0.4 05/23/2018    MONO 9.4 05/23/2018    EOS 0.2 05/23/2018    BASO 0.02 05/23/2018    EOSINOPHIL 3.4 05/23/2018    BASOPHIL 0.4 05/23/2018    DIFFMETHOD Automated 05/23/2018       Comprehensive Metabolic Panel  Lab Results   Component Value Date     05/23/2018    BUN 24 (H) 05/23/2018    CREATININE 0.62 05/23/2018     05/23/2018    K 4.9 05/23/2018     05/23/2018    PROT 7.6 05/23/2018    ALBUMIN 4.4 05/23/2018    BILITOT 0.2 05/23/2018    AST 25 05/23/2018    ALKPHOS 52 05/23/2018    CO2 26 05/23/2018    ALT 28 05/23/2018    ANIONGAP 10 05/23/2018 "    EGFRNONAA >60.0 05/23/2018    ESTGFRAFRICA >60.0 05/23/2018       LIPID  Lab Results   Component Value Date    CHOL 174 05/23/2018    HDL 63 05/23/2018         TSH  Lab Results   Component Value Date    TSH 1.930 05/23/2018       Current Outpatient Prescriptions   Medication Sig Dispense Refill    tretinoin (RETIN-A) 0.025 % cream Apply thin film to face qhs then moisturize 45 g 6    dextroamphetamine-amphetamine (ADDERALL XR) 15 MG 24 hr capsule Take 1 capsule (15 mg total) by mouth every morning. 30 capsule 0     No current facility-administered medications for this visit.        Assessment:    ICD-10-CM ICD-9-CM    1. Attention deficit disorder (ADD) in adult F98.8 314.00 dextroamphetamine-amphetamine (ADDERALL XR) 15 MG 24 hr capsule         Plan:  Will increase the Adderall to 15 mg daily  Follow-up in 1 month (on 7/8/2018).          Linda Pineda MD

## 2018-06-15 ENCOUNTER — APPOINTMENT (OUTPATIENT)
Dept: RADIOLOGY | Facility: OTHER | Age: 38
End: 2018-06-15
Attending: OBSTETRICS & GYNECOLOGY
Payer: COMMERCIAL

## 2018-06-15 DIAGNOSIS — Z12.39 BREAST CANCER SCREENING: ICD-10-CM

## 2018-06-15 PROCEDURE — 77067 SCR MAMMO BI INCL CAD: CPT | Mod: TC,PN

## 2018-06-15 PROCEDURE — 77063 BREAST TOMOSYNTHESIS BI: CPT | Mod: 26,,, | Performed by: INTERNAL MEDICINE

## 2018-06-15 PROCEDURE — 77067 SCR MAMMO BI INCL CAD: CPT | Mod: 26,,, | Performed by: INTERNAL MEDICINE

## 2018-06-17 NOTE — PROGRESS NOTES
Hi,  Your mammogram has been done but the radiologist is waiting to receive your previous films for a thorough and complete comparison before they finalize your mammogram report.   It normally can take up to 2 weeks to get your old films and do the review.   You should get a final complete mammogram report back when this is done.   If you do not get the final report within 2 weeks, please let me know so we can follow up on this together.  Take care,   Dr Lubin

## 2018-06-18 ENCOUNTER — PATIENT MESSAGE (OUTPATIENT)
Dept: OBSTETRICS AND GYNECOLOGY | Facility: CLINIC | Age: 38
End: 2018-06-18

## 2018-06-20 NOTE — PROGRESS NOTES
Efe Parr,  All looks good!!  This area is stable and looks the same as your previous MMG at  in 2016.  Yipee!   Dr Lubin

## 2018-07-13 ENCOUNTER — OFFICE VISIT (OUTPATIENT)
Dept: FAMILY MEDICINE | Facility: CLINIC | Age: 38
End: 2018-07-13
Payer: COMMERCIAL

## 2018-07-13 VITALS
HEART RATE: 69 BPM | DIASTOLIC BLOOD PRESSURE: 62 MMHG | SYSTOLIC BLOOD PRESSURE: 108 MMHG | HEIGHT: 62 IN | TEMPERATURE: 98 F | WEIGHT: 124 LBS | BODY MASS INDEX: 22.82 KG/M2 | RESPIRATION RATE: 18 BRPM | OXYGEN SATURATION: 100 %

## 2018-07-13 DIAGNOSIS — B00.1 HERPES LABIALIS: ICD-10-CM

## 2018-07-13 DIAGNOSIS — F98.8 ATTENTION DEFICIT DISORDER (ADD) IN ADULT: Primary | ICD-10-CM

## 2018-07-13 PROCEDURE — 99214 OFFICE O/P EST MOD 30 MIN: CPT | Mod: S$GLB,,, | Performed by: FAMILY MEDICINE

## 2018-07-13 PROCEDURE — 99999 PR PBB SHADOW E&M-EST. PATIENT-LVL III: CPT | Mod: PBBFAC,,, | Performed by: FAMILY MEDICINE

## 2018-07-13 PROCEDURE — 3008F BODY MASS INDEX DOCD: CPT | Mod: CPTII,S$GLB,, | Performed by: FAMILY MEDICINE

## 2018-07-13 RX ORDER — METHYLPHENIDATE HYDROCHLORIDE 10 MG/1
10 TABLET ORAL 2 TIMES DAILY WITH MEALS
Qty: 14 TABLET | Refills: 0 | Status: SHIPPED | OUTPATIENT
Start: 2018-07-13 | End: 2018-07-27 | Stop reason: SDUPTHER

## 2018-07-13 RX ORDER — VALACYCLOVIR HYDROCHLORIDE 1 G/1
1000 TABLET, FILM COATED ORAL 2 TIMES DAILY
Qty: 60 TABLET | Refills: 6 | Status: SHIPPED | OUTPATIENT
Start: 2018-07-13 | End: 2020-01-08

## 2018-07-13 NOTE — PROGRESS NOTES
HPI:  Karolyn Campbell is a 37 y.o. year old female that  presents for f/u of ADD. She states that although her medication is working  She does not feel as though it is helping in the evening. She recalls being on Ritalin  In college. It worked well for her. She feels anxious and impatient at  Night and her family is pointing it out.She is taking 5-HT and L-Theratine  But still is having symptoms of anxiety.  She needs refill on her valacyclovir for fever blisters.  Chief Complaint   Patient presents with    Medication Refill     Adderall--pt states the medication is wearing off in the evenings causing irriability   .     HPI      Past Medical History:   Diagnosis Date    Abnormal Pap smear of cervix     prior to being a pt     Allergy     Anxiety     Anxiety     HPV (human papilloma virus) infection     Hyperlipemia     Migraine      Social History     Social History    Marital status:      Spouse name: N/A    Number of children: N/A    Years of education: N/A     Occupational History    Not on file.     Social History Main Topics    Smoking status: Former Smoker    Smokeless tobacco: Never Used    Alcohol use Yes    Drug use: No    Sexual activity: Yes     Partners: Male     Birth control/ protection: Condom     Other Topics Concern    Are You Pregnant Or Think You May Be? No    Breast-Feeding No     Social History Narrative    Lives with her  in [place] with their 2 children. She is a  at [employer]. Her oldest sister was molested as a child. Her sister has alcohol and psychological problems. Her father is . She helps to care for her mother. She is spiritual. She does Elizabeth and yoga. She drinks one cup of coffee a day.      Past Surgical History:   Procedure Laterality Date    BREAST SURGERY       SECTION      replacement of  breast implant       due to rupture     RHINOPLASTY  2007     Family History   Problem Relation Age of  "Onset    Adopted: Yes    No Known Problems Daughter     ADD / ADHD Son     Strabismus Son            Review of Systems  General ROS: negative for chills, fever or weight loss  ENT ROS: negative for epistaxis, sore throat or rhinorrhea  Respiratory ROS: no cough, shortness of breath, or wheezing  Cardiovascular ROS: no chest pain or dyspnea on exertion  Gastrointestinal ROS: no abdominal pain, change in bowel habits, or black/ bloody stools    Physical Exam:  /62   Pulse 69   Temp 97.9 °F (36.6 °C) (Oral)   Resp 18   Ht 5' 2" (1.575 m)   Wt 56.2 kg (124 lb)   LMP 07/02/2018 (Exact Date)   SpO2 100%   BMI 22.68 kg/m²   General appearance: alert, cooperative, no distress  Constitutional:Oriented to person, place, and time.appears well-developed and well-nourished.  Lungs: clear to auscultation bilaterally, no dullness to percussion bilaterally  Heart: regular rate and rhythm without rub; no displacement of the PMI , S1&S2 present    Physical Exam    LABS:    Complete Blood Count  Lab Results   Component Value Date    RBC 4.72 05/23/2018    HGB 14.1 05/23/2018    HCT 43.3 05/23/2018    MCV 92 05/23/2018    MCH 29.9 05/23/2018    MCHC 32.6 05/23/2018    RDW 12.5 05/23/2018     05/23/2018    MPV 10.0 05/23/2018    GRAN 2.5 05/23/2018    GRAN 55.3 05/23/2018    LYMPH 1.4 05/23/2018    LYMPH 31.5 05/23/2018    MONO 0.4 05/23/2018    MONO 9.4 05/23/2018    EOS 0.2 05/23/2018    BASO 0.02 05/23/2018    EOSINOPHIL 3.4 05/23/2018    BASOPHIL 0.4 05/23/2018    DIFFMETHOD Automated 05/23/2018       Comprehensive Metabolic Panel  Lab Results   Component Value Date     05/23/2018    BUN 24 (H) 05/23/2018    CREATININE 0.62 05/23/2018     05/23/2018    K 4.9 05/23/2018     05/23/2018    PROT 7.6 05/23/2018    ALBUMIN 4.4 05/23/2018    BILITOT 0.2 05/23/2018    AST 25 05/23/2018    ALKPHOS 52 05/23/2018    CO2 26 05/23/2018    ALT 28 05/23/2018    ANIONGAP 10 05/23/2018    EGFRNONAA >60.0 " 05/23/2018    ESTGFRAFRICA >60.0 05/23/2018       LIPID  Lab Results   Component Value Date    CHOL 174 05/23/2018    HDL 63 05/23/2018         TSH  Lab Results   Component Value Date    TSH 1.930 05/23/2018       Current Outpatient Prescriptions   Medication Sig Dispense Refill    dextroamphetamine-amphetamine (ADDERALL XR) 15 MG 24 hr capsule Take 1 capsule (15 mg total) by mouth every morning. 30 capsule 0    tretinoin (RETIN-A) 0.025 % cream Apply thin film to face qhs then moisturize 45 g 6    methylphenidate HCl (RITALIN) 10 MG tablet Take 1 tablet (10 mg total) by mouth 2 (two) times daily with meals. 14 tablet 0    valACYclovir (VALTREX) 1000 MG tablet Take 1 tablet (1,000 mg total) by mouth 2 (two) times daily. 60 tablet 6     No current facility-administered medications for this visit.        Assessment:    ICD-10-CM ICD-9-CM    1. Attention deficit disorder (ADD) in adult F98.8 314.00 methylphenidate HCl (RITALIN) 10 MG tablet   2. Herpes labialis B00.1 054.9 valACYclovir (VALTREX) 1000 MG tablet         Plan:    Follow-up in 1 week (on 7/20/2018).          Linda Pineda MD

## 2018-07-20 DIAGNOSIS — F98.8 ATTENTION DEFICIT DISORDER (ADD) IN ADULT: ICD-10-CM

## 2018-07-20 RX ORDER — METHYLPHENIDATE HYDROCHLORIDE 10 MG/1
10 TABLET ORAL 2 TIMES DAILY WITH MEALS
Qty: 14 TABLET | Refills: 0 | Status: CANCELLED | OUTPATIENT
Start: 2018-07-20

## 2018-07-20 NOTE — TELEPHONE ENCOUNTER
Please let patient know that dr. Pineda will be out of town until Monday - to wait until she can get instructions from Dr. Pineda prior to changing medication dose and then forward message to Dr. Pineda.

## 2018-07-27 ENCOUNTER — OFFICE VISIT (OUTPATIENT)
Dept: FAMILY MEDICINE | Facility: CLINIC | Age: 38
End: 2018-07-27
Payer: COMMERCIAL

## 2018-07-27 VITALS
SYSTOLIC BLOOD PRESSURE: 122 MMHG | DIASTOLIC BLOOD PRESSURE: 76 MMHG | OXYGEN SATURATION: 98 % | HEART RATE: 72 BPM | HEIGHT: 62 IN | WEIGHT: 127.88 LBS | TEMPERATURE: 98 F | BODY MASS INDEX: 23.53 KG/M2

## 2018-07-27 DIAGNOSIS — F98.8 ATTENTION DEFICIT DISORDER (ADD) IN ADULT: ICD-10-CM

## 2018-07-27 PROCEDURE — 99214 OFFICE O/P EST MOD 30 MIN: CPT | Mod: S$GLB,,, | Performed by: FAMILY MEDICINE

## 2018-07-27 PROCEDURE — 99999 PR PBB SHADOW E&M-EST. PATIENT-LVL III: CPT | Mod: PBBFAC,,, | Performed by: FAMILY MEDICINE

## 2018-07-27 PROCEDURE — 3008F BODY MASS INDEX DOCD: CPT | Mod: CPTII,S$GLB,, | Performed by: FAMILY MEDICINE

## 2018-07-27 RX ORDER — METHYLPHENIDATE HYDROCHLORIDE 10 MG/1
TABLET ORAL
Qty: 90 TABLET | Refills: 0 | Status: SHIPPED | OUTPATIENT
Start: 2018-07-27 | End: 2018-08-28 | Stop reason: SDUPTHER

## 2018-07-30 NOTE — PROGRESS NOTES
HPI:  Karolyn Campbell is a 37 y.o. year old female that  presents with   Chief Complaint   Patient presents with    Follow-up     med check   .     HPI      Past Medical History:   Diagnosis Date    Abnormal Pap smear of cervix     prior to being a pt     Allergy     Anxiety     Anxiety     HPV (human papilloma virus) infection     Hyperlipemia     Migraine      Social History     Social History    Marital status:      Spouse name: N/A    Number of children: N/A    Years of education: N/A     Occupational History    Not on file.     Social History Main Topics    Smoking status: Former Smoker    Smokeless tobacco: Never Used    Alcohol use Yes    Drug use: No    Sexual activity: Yes     Partners: Male     Birth control/ protection: Condom     Other Topics Concern    Are You Pregnant Or Think You May Be? No    Breast-Feeding No     Social History Narrative    Lives with her  in [place] with their 2 children. She is a  at [employer]. Her oldest sister was molested as a child. Her sister has alcohol and psychological problems. Her father is . She helps to care for her mother. She is spiritual. She does Elizabeth and yoga. She drinks one cup of coffee a day.      Past Surgical History:   Procedure Laterality Date    BREAST SURGERY       SECTION      replacement of  breast implant       due to rupture     RHINOPLASTY  2007     Family History   Problem Relation Age of Onset    Adopted: Yes    No Known Problems Daughter     ADD / ADHD Son     Strabismus Son            Review of Systems  General ROS: negative for chills, fever or weight loss  ENT ROS: negative for epistaxis, sore throat or rhinorrhea  Respiratory ROS: no cough, shortness of breath, or wheezing  Cardiovascular ROS: no chest pain or dyspnea on exertion  Gastrointestinal ROS: no abdominal pain, change in bowel habits, or black/ bloody stools    Physical Exam:  /76    "Pulse 72   Temp 97.8 °F (36.6 °C) (Oral)   Ht 5' 2" (1.575 m)   Wt 58 kg (127 lb 13.9 oz)   LMP 07/02/2018 (Exact Date)   SpO2 98%   BMI 23.39 kg/m²   General appearance: alert, cooperative, no distress  Constitutional:Oriented to person, place, and time.appears well-developed and well-nourished.  HEENT: Normocephalic, atraumatic, neck symmetrical, no nasal discharge, TM- clear bilaterally  Lungs: clear to auscultation bilaterally, no dullness to percussion bilaterally  Heart: regular rate and rhythm without rub; no displacement of the PMI , S1&S2 present  Abdomen: soft, non-tender; bowel sounds normoactive; no organomegaly  Physical Exam    LABS:    Complete Blood Count  Lab Results   Component Value Date    RBC 4.72 05/23/2018    HGB 14.1 05/23/2018    HCT 43.3 05/23/2018    MCV 92 05/23/2018    MCH 29.9 05/23/2018    MCHC 32.6 05/23/2018    RDW 12.5 05/23/2018     05/23/2018    MPV 10.0 05/23/2018    GRAN 2.5 05/23/2018    GRAN 55.3 05/23/2018    LYMPH 1.4 05/23/2018    LYMPH 31.5 05/23/2018    MONO 0.4 05/23/2018    MONO 9.4 05/23/2018    EOS 0.2 05/23/2018    BASO 0.02 05/23/2018    EOSINOPHIL 3.4 05/23/2018    BASOPHIL 0.4 05/23/2018    DIFFMETHOD Automated 05/23/2018       Comprehensive Metabolic Panel  Lab Results   Component Value Date     05/23/2018    BUN 24 (H) 05/23/2018    CREATININE 0.62 05/23/2018     05/23/2018    K 4.9 05/23/2018     05/23/2018    PROT 7.6 05/23/2018    ALBUMIN 4.4 05/23/2018    BILITOT 0.2 05/23/2018    AST 25 05/23/2018    ALKPHOS 52 05/23/2018    CO2 26 05/23/2018    ALT 28 05/23/2018    ANIONGAP 10 05/23/2018    EGFRNONAA >60.0 05/23/2018    ESTGFRAFRICA >60.0 05/23/2018       LIPID  Lab Results   Component Value Date    CHOL 174 05/23/2018    HDL 63 05/23/2018         TSH  Lab Results   Component Value Date    TSH 1.930 05/23/2018       Current Outpatient Prescriptions   Medication Sig Dispense Refill    methylphenidate HCl (RITALIN) 10 MG " tablet Take 2 tabs in the am and then 1 tab in the 90 tablet 0    tretinoin (RETIN-A) 0.025 % cream Apply thin film to face qhs then moisturize 45 g 6    valACYclovir (VALTREX) 1000 MG tablet Take 1 tablet (1,000 mg total) by mouth 2 (two) times daily. 60 tablet 6    dextroamphetamine-amphetamine (ADDERALL XR) 15 MG 24 hr capsule Take 1 capsule (15 mg total) by mouth every morning. 30 capsule 0     No current facility-administered medications for this visit.        Assessment:    ICD-10-CM ICD-9-CM    1. Attention deficit disorder (ADD) in adult F98.8 314.00 methylphenidate HCl (RITALIN) 10 MG tablet         Plan:    No Follow-up on file.          Linda Pineda MD

## 2018-08-28 ENCOUNTER — OFFICE VISIT (OUTPATIENT)
Dept: FAMILY MEDICINE | Facility: CLINIC | Age: 38
End: 2018-08-28
Payer: COMMERCIAL

## 2018-08-28 VITALS
DIASTOLIC BLOOD PRESSURE: 76 MMHG | HEIGHT: 62 IN | TEMPERATURE: 98 F | WEIGHT: 128.31 LBS | OXYGEN SATURATION: 99 % | SYSTOLIC BLOOD PRESSURE: 110 MMHG | HEART RATE: 69 BPM | BODY MASS INDEX: 23.61 KG/M2

## 2018-08-28 DIAGNOSIS — F98.8 ATTENTION DEFICIT DISORDER (ADD) IN ADULT: ICD-10-CM

## 2018-08-28 PROCEDURE — 3008F BODY MASS INDEX DOCD: CPT | Mod: CPTII,S$GLB,, | Performed by: FAMILY MEDICINE

## 2018-08-28 PROCEDURE — 99214 OFFICE O/P EST MOD 30 MIN: CPT | Mod: S$GLB,,, | Performed by: FAMILY MEDICINE

## 2018-08-28 PROCEDURE — 99999 PR PBB SHADOW E&M-EST. PATIENT-LVL III: CPT | Mod: PBBFAC,,, | Performed by: FAMILY MEDICINE

## 2018-08-28 RX ORDER — METHYLPHENIDATE HYDROCHLORIDE 10 MG/1
TABLET ORAL
Qty: 75 TABLET | Refills: 0 | Status: SHIPPED | OUTPATIENT
Start: 2018-08-28 | End: 2018-10-26 | Stop reason: SDUPTHER

## 2018-08-31 NOTE — PROGRESS NOTES
HPI:  Karolyn Campbell is a 37 y.o. year old female that  presents for medication refill. She states that the medication at 20 mg was too strong so she was breaking up into 15 mg tablet.   Chief Complaint   Patient presents with    Medication Refill   .     HPI      Past Medical History:   Diagnosis Date    Abnormal Pap smear of cervix     prior to being a pt     Allergy     Anxiety     Anxiety     HPV (human papilloma virus) infection     Hyperlipemia     Migraine      Social History     Socioeconomic History    Marital status:      Spouse name: Not on file    Number of children: Not on file    Years of education: Not on file    Highest education level: Not on file   Social Needs    Financial resource strain: Not on file    Food insecurity - worry: Not on file    Food insecurity - inability: Not on file    Transportation needs - medical: Not on file    Transportation needs - non-medical: Not on file   Occupational History    Not on file   Tobacco Use    Smoking status: Former Smoker    Smokeless tobacco: Never Used   Substance and Sexual Activity    Alcohol use: Yes    Drug use: No    Sexual activity: Yes     Partners: Male     Birth control/protection: Condom   Other Topics Concern    Are you pregnant or think you may be? No    Breast-feeding No   Social History Narrative    Lives with her  in [place] with their 2 children. She is a  at [employer]. Her oldest sister was molested as a child. Her sister has alcohol and psychological problems. Her father is . She helps to care for her mother. She is spiritual. She does Elizabeth and yoga. She drinks one cup of coffee a day.      Past Surgical History:   Procedure Laterality Date    BREAST SURGERY       SECTION      replacement of  breast implant       due to rupture     RHINOPLASTY       Family History   Adopted: Yes   Problem Relation Age of Onset    No Known Problems Daughter  "    ADD / ADHD Son     Strabismus Son            Review of Systems  General ROS: negative for chills, fever or weight loss  ENT ROS: negative for epistaxis, sore throat or rhinorrhea  Respiratory ROS: no cough, shortness of breath, or wheezing  Cardiovascular ROS: no chest pain or dyspnea on exertion  Gastrointestinal ROS: no abdominal pain, change in bowel habits, or black/ bloody stools    Physical Exam:  /76 (BP Location: Right arm, Patient Position: Sitting, BP Method: Medium (Manual))   Pulse 69   Temp 97.9 °F (36.6 °C) (Oral)   Ht 5' 2" (1.575 m)   Wt 58.2 kg (128 lb 4.9 oz)   SpO2 99%   BMI 23.47 kg/m²   General appearance: alert, cooperative, no distress  Constitutional:Oriented to person, place, and time.appears well-developed and well-nourished.  Lungs: clear to auscultation bilaterally, no dullness to percussion bilaterally  Heart: regular rate and rhythm without rub; no displacement of the PMI , S1&S2 present    Physical Exam    LABS:    Complete Blood Count  Lab Results   Component Value Date    RBC 4.72 05/23/2018    HGB 14.1 05/23/2018    HCT 43.3 05/23/2018    MCV 92 05/23/2018    MCH 29.9 05/23/2018    MCHC 32.6 05/23/2018    RDW 12.5 05/23/2018     05/23/2018    MPV 10.0 05/23/2018    GRAN 2.5 05/23/2018    GRAN 55.3 05/23/2018    LYMPH 1.4 05/23/2018    LYMPH 31.5 05/23/2018    MONO 0.4 05/23/2018    MONO 9.4 05/23/2018    EOS 0.2 05/23/2018    BASO 0.02 05/23/2018    EOSINOPHIL 3.4 05/23/2018    BASOPHIL 0.4 05/23/2018    DIFFMETHOD Automated 05/23/2018       Comprehensive Metabolic Panel  Lab Results   Component Value Date     05/23/2018    BUN 24 (H) 05/23/2018    CREATININE 0.62 05/23/2018     05/23/2018    K 4.9 05/23/2018     05/23/2018    PROT 7.6 05/23/2018    ALBUMIN 4.4 05/23/2018    BILITOT 0.2 05/23/2018    AST 25 05/23/2018    ALKPHOS 52 05/23/2018    CO2 26 05/23/2018    ALT 28 05/23/2018    ANIONGAP 10 05/23/2018    EGFRNONAA >60.0 05/23/2018 "    ESTGFRAFRICA >60.0 05/23/2018       LIPID  Lab Results   Component Value Date    CHOL 174 05/23/2018    HDL 63 05/23/2018         TSH  Lab Results   Component Value Date    TSH 1.930 05/23/2018       Current Outpatient Medications   Medication Sig Dispense Refill    methylphenidate HCl (RITALIN) 10 MG tablet 1.5 tabs po in am and 1.5 tabs po in pm 75 tablet 0    tretinoin (RETIN-A) 0.025 % cream Apply thin film to face qhs then moisturize 45 g 6    valACYclovir (VALTREX) 1000 MG tablet Take 1 tablet (1,000 mg total) by mouth 2 (two) times daily. 60 tablet 6     No current facility-administered medications for this visit.        Assessment:    ICD-10-CM ICD-9-CM    1. Attention deficit disorder (ADD) in adult F98.8 314.00 methylphenidate HCl (RITALIN) 10 MG tablet         Plan:    Follow-up in 1 month (on 9/28/2018).          Linda Pineda MD

## 2018-10-19 ENCOUNTER — TELEPHONE (OUTPATIENT)
Dept: FAMILY MEDICINE | Facility: CLINIC | Age: 38
End: 2018-10-19

## 2018-10-19 NOTE — TELEPHONE ENCOUNTER
----- Message from Dary Hernández sent at 10/19/2018  2:59 PM CDT -----  Contact: PT  PT is calling regarding appointment needed for a med check.  PT can't make the 10/22 appt because of the time frame.  PT can't wait until 11/19 for a refill.     Callback: [phone]

## 2018-10-26 ENCOUNTER — OFFICE VISIT (OUTPATIENT)
Dept: FAMILY MEDICINE | Facility: CLINIC | Age: 38
End: 2018-10-26
Payer: COMMERCIAL

## 2018-10-26 VITALS
DIASTOLIC BLOOD PRESSURE: 82 MMHG | SYSTOLIC BLOOD PRESSURE: 116 MMHG | HEART RATE: 72 BPM | BODY MASS INDEX: 22.82 KG/M2 | TEMPERATURE: 98 F | HEIGHT: 62 IN | WEIGHT: 124 LBS | RESPIRATION RATE: 18 BRPM | OXYGEN SATURATION: 99 %

## 2018-10-26 DIAGNOSIS — F98.8 ATTENTION DEFICIT DISORDER (ADD) IN ADULT: ICD-10-CM

## 2018-10-26 PROCEDURE — 99999 PR PBB SHADOW E&M-EST. PATIENT-LVL III: CPT | Mod: PBBFAC,,, | Performed by: FAMILY MEDICINE

## 2018-10-26 PROCEDURE — 99214 OFFICE O/P EST MOD 30 MIN: CPT | Mod: S$GLB,,, | Performed by: FAMILY MEDICINE

## 2018-10-26 PROCEDURE — 3008F BODY MASS INDEX DOCD: CPT | Mod: CPTII,S$GLB,, | Performed by: FAMILY MEDICINE

## 2018-10-26 RX ORDER — DOXYCYCLINE 100 MG/1
100 CAPSULE ORAL 2 TIMES DAILY
Refills: 0 | COMMUNITY
Start: 2018-08-15 | End: 2018-10-26

## 2018-10-26 RX ORDER — METHYLPHENIDATE HYDROCHLORIDE 10 MG/1
TABLET ORAL
Qty: 75 TABLET | Refills: 0 | Status: SHIPPED | OUTPATIENT
Start: 2018-10-26 | End: 2018-11-21 | Stop reason: SDUPTHER

## 2018-10-29 NOTE — PROGRESS NOTES
HPI:  Karolyn Campbell is a 38 y.o. year old female that  presents for refill of Ritalin.  Patient denies any complaints of appetite or sleep.  Patient is satisfied with the dosage of the ritalin.  Chief Complaint   Patient presents with    Medication Refill     Ritalin   .     HPI      Past Medical History:   Diagnosis Date    Abnormal Pap smear of cervix     prior to being a pt     Allergy     Anxiety     Anxiety     HPV (human papilloma virus) infection     Hyperlipemia     Migraine      Social History     Socioeconomic History    Marital status:      Spouse name: Not on file    Number of children: Not on file    Years of education: Not on file    Highest education level: Not on file   Social Needs    Financial resource strain: Not on file    Food insecurity - worry: Not on file    Food insecurity - inability: Not on file    Transportation needs - medical: Not on file    Transportation needs - non-medical: Not on file   Occupational History    Not on file   Tobacco Use    Smoking status: Former Smoker    Smokeless tobacco: Never Used   Substance and Sexual Activity    Alcohol use: Yes    Drug use: No    Sexual activity: Yes     Partners: Male     Birth control/protection: Condom   Other Topics Concern    Are you pregnant or think you may be? No    Breast-feeding No   Social History Narrative    Lives with her  in [place] with their 2 children. She is a  at [employer]. Her oldest sister was molested as a child. Her sister has alcohol and psychological problems. Her father is . She helps to care for her mother. She is spiritual. She does Elizabeth and yoga. She drinks one cup of coffee a day.      Past Surgical History:   Procedure Laterality Date    BREAST SURGERY       SECTION      replacement of  breast implant       due to rupture     RHINOPLASTY       Family History   Adopted: Yes   Problem Relation Age of Onset    No  "Known Problems Daughter     ADD / ADHD Son     Strabismus Son            Review of Systems  General ROS: negative for chills, fever or weight loss  ENT ROS: negative for epistaxis, sore throat or rhinorrhea  Respiratory ROS: no cough, shortness of breath, or wheezing  Cardiovascular ROS: no chest pain or dyspnea on exertion  Gastrointestinal ROS: no abdominal pain, change in bowel habits, or black/ bloody stools    Physical Exam:  /82   Pulse 72   Temp 98.4 °F (36.9 °C) (Oral)   Resp 18   Ht 5' 2" (1.575 m)   Wt 56.2 kg (124 lb)   LMP 10/20/2018 (Exact Date)   SpO2 99%   BMI 22.68 kg/m²   General appearance: alert, cooperative, no distress  Constitutional:Oriented to person, place, and time.appears well-developed and well-nourished.  Lungs: clear to auscultation bilaterally, no dullness to percussion bilaterally  Heart: regular rate and rhythm without rub; no displacement of the PMI , S1&S2 present  Abdomen: soft, non-tender; bowel sounds normoactive; no organomegaly  Physical Exam    LABS:    Complete Blood Count  Lab Results   Component Value Date    RBC 4.72 05/23/2018    HGB 14.1 05/23/2018    HCT 43.3 05/23/2018    MCV 92 05/23/2018    MCH 29.9 05/23/2018    MCHC 32.6 05/23/2018    RDW 12.5 05/23/2018     05/23/2018    MPV 10.0 05/23/2018    GRAN 2.5 05/23/2018    GRAN 55.3 05/23/2018    LYMPH 1.4 05/23/2018    LYMPH 31.5 05/23/2018    MONO 0.4 05/23/2018    MONO 9.4 05/23/2018    EOS 0.2 05/23/2018    BASO 0.02 05/23/2018    EOSINOPHIL 3.4 05/23/2018    BASOPHIL 0.4 05/23/2018    DIFFMETHOD Automated 05/23/2018       Comprehensive Metabolic Panel  Lab Results   Component Value Date     05/23/2018    BUN 24 (H) 05/23/2018    CREATININE 0.62 05/23/2018     05/23/2018    K 4.9 05/23/2018     05/23/2018    PROT 7.6 05/23/2018    ALBUMIN 4.4 05/23/2018    BILITOT 0.2 05/23/2018    AST 25 05/23/2018    ALKPHOS 52 05/23/2018    CO2 26 05/23/2018    ALT 28 05/23/2018    " ANIONGAP 10 05/23/2018    EGFRNONAA >60.0 05/23/2018    ESTGFRAFRICA >60.0 05/23/2018       LIPID  Lab Results   Component Value Date    CHOL 174 05/23/2018    HDL 63 05/23/2018         TSH  Lab Results   Component Value Date    TSH 1.930 05/23/2018       Current Outpatient Medications   Medication Sig Dispense Refill    methylphenidate HCl (RITALIN) 10 MG tablet 1.5 tabs po in am and 1.5 tabs po in pm 75 tablet 0    tretinoin (RETIN-A) 0.025 % cream Apply thin film to face qhs then moisturize 45 g 6    valACYclovir (VALTREX) 1000 MG tablet Take 1 tablet (1,000 mg total) by mouth 2 (two) times daily. 60 tablet 6     No current facility-administered medications for this visit.        Assessment:    ICD-10-CM ICD-9-CM    1. Attention deficit disorder (ADD) in adult F98.8 314.00 methylphenidate HCl (RITALIN) 10 MG tablet         Plan:    Follow-up in 1 month (on 11/26/2018).          Linda Pineda MD  Answers for HPI/ROS submitted by the patient on 10/24/2018   activity change: No  unexpected weight change: No  neck pain: No  hearing loss: No  rhinorrhea: No  trouble swallowing: No  eye discharge: No  visual disturbance: No  chest tightness: No  wheezing: No  chest pain: No  palpitations: No  blood in stool: No  constipation: No  vomiting: No  diarrhea: No  polydipsia: No  polyuria: No  difficulty urinating: No  hematuria: No  menstrual problem: No  dysuria: No  joint swelling: No  arthralgias: No  headaches: No  weakness: No  confusion: No  dysphoric mood: No

## 2018-11-21 ENCOUNTER — OFFICE VISIT (OUTPATIENT)
Dept: FAMILY MEDICINE | Facility: CLINIC | Age: 38
End: 2018-11-21
Payer: COMMERCIAL

## 2018-11-21 VITALS
HEIGHT: 62 IN | RESPIRATION RATE: 18 BRPM | HEART RATE: 86 BPM | BODY MASS INDEX: 23.19 KG/M2 | WEIGHT: 126 LBS | DIASTOLIC BLOOD PRESSURE: 68 MMHG | OXYGEN SATURATION: 100 % | TEMPERATURE: 98 F | SYSTOLIC BLOOD PRESSURE: 110 MMHG

## 2018-11-21 DIAGNOSIS — F98.8 ATTENTION DEFICIT DISORDER (ADD) IN ADULT: Primary | ICD-10-CM

## 2018-11-21 PROCEDURE — 3008F BODY MASS INDEX DOCD: CPT | Mod: CPTII,S$GLB,, | Performed by: FAMILY MEDICINE

## 2018-11-21 PROCEDURE — 99999 PR PBB SHADOW E&M-EST. PATIENT-LVL III: CPT | Mod: PBBFAC,,, | Performed by: FAMILY MEDICINE

## 2018-11-21 PROCEDURE — 99214 OFFICE O/P EST MOD 30 MIN: CPT | Mod: S$GLB,,, | Performed by: FAMILY MEDICINE

## 2018-11-21 RX ORDER — METHYLPHENIDATE HYDROCHLORIDE 10 MG/1
TABLET ORAL
Qty: 75 TABLET | Refills: 0 | Status: SHIPPED | OUTPATIENT
Start: 2018-11-21 | End: 2018-12-31 | Stop reason: SDUPTHER

## 2018-11-21 NOTE — PROGRESS NOTES
HPI:  Karolyn Campbell is a 38 y.o. year old female that  presents for f/u of ADD. She has been doing well on one and one half.  Chief Complaint   Patient presents with    Medication Refill     Ritalin   .     HPI      Past Medical History:   Diagnosis Date    Abnormal Pap smear of cervix     prior to being a pt     Allergy     Anxiety     Anxiety     HPV (human papilloma virus) infection     Hyperlipemia     Migraine      Social History     Socioeconomic History    Marital status:      Spouse name: Not on file    Number of children: Not on file    Years of education: Not on file    Highest education level: Not on file   Social Needs    Financial resource strain: Not on file    Food insecurity - worry: Not on file    Food insecurity - inability: Not on file    Transportation needs - medical: Not on file    Transportation needs - non-medical: Not on file   Occupational History    Not on file   Tobacco Use    Smoking status: Former Smoker    Smokeless tobacco: Never Used   Substance and Sexual Activity    Alcohol use: Yes    Drug use: No    Sexual activity: Yes     Partners: Male     Birth control/protection: Condom   Other Topics Concern    Are you pregnant or think you may be? No    Breast-feeding No   Social History Narrative    Lives with her  in [place] with their 2 children. She is a  at [employer]. Her oldest sister was molested as a child. Her sister has alcohol and psychological problems. Her father is . She helps to care for her mother. She is spiritual. She does Elizabeth and yoga. She drinks one cup of coffee a day.      Past Surgical History:   Procedure Laterality Date    BREAST SURGERY       SECTION      replacement of  breast implant       due to rupture     RHINOPLASTY       Family History   Adopted: Yes   Problem Relation Age of Onset    No Known Problems Daughter     ADD / ADHD Son     Strabismus Son   "          Review of Systems  General ROS: negative for chills, fever or weight loss  ENT ROS: negative for epistaxis, sore throat or rhinorrhea  Respiratory ROS: no cough, shortness of breath, or wheezing  Cardiovascular ROS: no chest pain or dyspnea on exertion  Gastrointestinal ROS: no abdominal pain, change in bowel habits, or black/ bloody stools    Physical Exam:  /68   Pulse 86   Temp 97.9 °F (36.6 °C) (Oral)   Resp 18   Ht 5' 2" (1.575 m)   Wt 57.2 kg (126 lb)   LMP 11/18/2018 (Exact Date)   SpO2 100%   BMI 23.05 kg/m²   General appearance: alert, cooperative, no distress  Constitutional:Oriented to person, place, and time.appears well-developed and well-nourished.  Lungs: clear to auscultation bilaterally, no dullness to percussion bilaterally  Heart: regular rate and rhythm without rub; no displacement of the PMI , S1&S2 present    Physical Exam    LABS:    Complete Blood Count  Lab Results   Component Value Date    RBC 4.72 05/23/2018    HGB 14.1 05/23/2018    HCT 43.3 05/23/2018    MCV 92 05/23/2018    MCH 29.9 05/23/2018    MCHC 32.6 05/23/2018    RDW 12.5 05/23/2018     05/23/2018    MPV 10.0 05/23/2018    GRAN 2.5 05/23/2018    GRAN 55.3 05/23/2018    LYMPH 1.4 05/23/2018    LYMPH 31.5 05/23/2018    MONO 0.4 05/23/2018    MONO 9.4 05/23/2018    EOS 0.2 05/23/2018    BASO 0.02 05/23/2018    EOSINOPHIL 3.4 05/23/2018    BASOPHIL 0.4 05/23/2018    DIFFMETHOD Automated 05/23/2018       Comprehensive Metabolic Panel  Lab Results   Component Value Date     05/23/2018    BUN 24 (H) 05/23/2018    CREATININE 0.62 05/23/2018     05/23/2018    K 4.9 05/23/2018     05/23/2018    PROT 7.6 05/23/2018    ALBUMIN 4.4 05/23/2018    BILITOT 0.2 05/23/2018    AST 25 05/23/2018    ALKPHOS 52 05/23/2018    CO2 26 05/23/2018    ALT 28 05/23/2018    ANIONGAP 10 05/23/2018    EGFRNONAA >60.0 05/23/2018    ESTGFRAFRICA >60.0 05/23/2018       LIPID  Lab Results   Component Value Date    " CHOL 174 05/23/2018    HDL 63 05/23/2018         TSH  Lab Results   Component Value Date    TSH 1.930 05/23/2018       Current Outpatient Medications   Medication Sig Dispense Refill    methylphenidate HCl (RITALIN) 10 MG tablet 1.5 tabs po in am and 1.5 tabs po in pm 75 tablet 0    tretinoin (RETIN-A) 0.025 % cream Apply thin film to face qhs then moisturize 45 g 6    valACYclovir (VALTREX) 1000 MG tablet Take 1 tablet (1,000 mg total) by mouth 2 (two) times daily. 60 tablet 6     No current facility-administered medications for this visit.        Assessment:    ICD-10-CM ICD-9-CM    1. Attention deficit disorder (ADD) in adult F98.8 314.00 methylphenidate HCl (RITALIN) 10 MG tablet         Plan:    Follow-up in 1 month (on 12/21/2018).          Linda Pineda MD

## 2018-12-31 ENCOUNTER — OFFICE VISIT (OUTPATIENT)
Dept: FAMILY MEDICINE | Facility: CLINIC | Age: 38
End: 2018-12-31
Payer: COMMERCIAL

## 2018-12-31 VITALS
HEART RATE: 100 BPM | SYSTOLIC BLOOD PRESSURE: 110 MMHG | TEMPERATURE: 98 F | HEIGHT: 62 IN | DIASTOLIC BLOOD PRESSURE: 80 MMHG | WEIGHT: 127.19 LBS | BODY MASS INDEX: 23.4 KG/M2 | OXYGEN SATURATION: 94 %

## 2018-12-31 DIAGNOSIS — J06.9 URI, ACUTE: Primary | ICD-10-CM

## 2018-12-31 DIAGNOSIS — F98.8 ATTENTION DEFICIT DISORDER (ADD) IN ADULT: ICD-10-CM

## 2018-12-31 PROCEDURE — 99999 PR PBB SHADOW E&M-EST. PATIENT-LVL III: CPT | Mod: PBBFAC,,, | Performed by: FAMILY MEDICINE

## 2018-12-31 PROCEDURE — 99214 OFFICE O/P EST MOD 30 MIN: CPT | Mod: S$GLB,,, | Performed by: FAMILY MEDICINE

## 2018-12-31 PROCEDURE — 3008F BODY MASS INDEX DOCD: CPT | Mod: CPTII,S$GLB,, | Performed by: FAMILY MEDICINE

## 2018-12-31 RX ORDER — METHYLPHENIDATE HYDROCHLORIDE 10 MG/1
TABLET ORAL
Qty: 75 TABLET | Refills: 0 | Status: SHIPPED | OUTPATIENT
Start: 2018-12-31 | End: 2019-02-01 | Stop reason: SDUPTHER

## 2018-12-31 NOTE — PROGRESS NOTES
HPI:  Karolyn Campbell is a 38 y.o. year old female that  presents for med refill. She has been having sinus congestion for a few days without fever. Her kids  Have been under the weather as well. She has a sore throat but not any more. .  She feels that her Ritalin dosage is working well.   Chief Complaint   Patient presents with    Medication Refill    Nasal Congestion    Sinus Problem   .           Past Medical History:   Diagnosis Date    Abnormal Pap smear of cervix     prior to being a pt     Allergy     Anxiety     Anxiety     HPV (human papilloma virus) infection     Hyperlipemia     Migraine      Social History     Socioeconomic History    Marital status:      Spouse name: Not on file    Number of children: Not on file    Years of education: Not on file    Highest education level: Not on file   Social Needs    Financial resource strain: Not on file    Food insecurity - worry: Not on file    Food insecurity - inability: Not on file    Transportation needs - medical: Not on file    Transportation needs - non-medical: Not on file   Occupational History    Not on file   Tobacco Use    Smoking status: Former Smoker    Smokeless tobacco: Never Used   Substance and Sexual Activity    Alcohol use: Yes    Drug use: No    Sexual activity: Yes     Partners: Male     Birth control/protection: Condom   Other Topics Concern    Are you pregnant or think you may be? No    Breast-feeding No   Social History Narrative    Lives with her  in [place] with their 2 children. She is a  at [employer]. Her oldest sister was molested as a child. Her sister has alcohol and psychological problems. Her father is . She helps to care for her mother. She is spiritual. She does Elizabeth and yoga. She drinks one cup of coffee a day.      Past Surgical History:   Procedure Laterality Date    BREAST SURGERY       SECTION      replacement of  breast implant    "2008/2012    due to rupture     RHINOPLASTY  2007     Family History   Adopted: Yes   Problem Relation Age of Onset    No Known Problems Daughter     ADD / ADHD Son     Strabismus Son            Review of Systems  General ROS: negative for chills, fever or weight loss  ENT ROS: negative for epistaxis, sore throat or rhinorrhea  Respiratory ROS: no cough, shortness of breath, or wheezing  Cardiovascular ROS: no chest pain or dyspnea on exertion  Gastrointestinal ROS: no abdominal pain, change in bowel habits, or black/ bloody stools    Physical Exam:  /80 (BP Location: Left arm, Patient Position: Sitting, BP Method: Medium (Manual))   Pulse 100   Temp 98.4 °F (36.9 °C) (Oral)   Ht 5' 2" (1.575 m)   Wt 57.7 kg (127 lb 3.3 oz)   SpO2 (!) 94%   BMI 23.27 kg/m²   General appearance: alert, cooperative, no distress  Constitutional:Oriented to person, place, and time.appears well-developed and well-nourished.  HEENT: Normocephalic, atraumatic, neck symmetrical, no nasal discharge, TM- clear bilaterally, mild post pharyngeal injections without any tonsil hypertrophy  Lungs: clear to auscultation bilaterally, no dullness to percussion bilaterally  Heart: regular rate and rhythm without rub; no displacement of the PMI , S1&S2 present  Abdomen: soft, non-tender; bowel sounds normoactive; no organomegaly  Physical Exam      LABS:    Complete Blood Count  Lab Results   Component Value Date    RBC 4.72 05/23/2018    HGB 14.1 05/23/2018    HCT 43.3 05/23/2018    MCV 92 05/23/2018    MCH 29.9 05/23/2018    MCHC 32.6 05/23/2018    RDW 12.5 05/23/2018     05/23/2018    MPV 10.0 05/23/2018    GRAN 2.5 05/23/2018    GRAN 55.3 05/23/2018    LYMPH 1.4 05/23/2018    LYMPH 31.5 05/23/2018    MONO 0.4 05/23/2018    MONO 9.4 05/23/2018    EOS 0.2 05/23/2018    BASO 0.02 05/23/2018    EOSINOPHIL 3.4 05/23/2018    BASOPHIL 0.4 05/23/2018    DIFFMETHOD Automated 05/23/2018       Comprehensive Metabolic Panel  Lab Results "   Component Value Date     05/23/2018    BUN 24 (H) 05/23/2018    CREATININE 0.62 05/23/2018     05/23/2018    K 4.9 05/23/2018     05/23/2018    PROT 7.6 05/23/2018    ALBUMIN 4.4 05/23/2018    BILITOT 0.2 05/23/2018    AST 25 05/23/2018    ALKPHOS 52 05/23/2018    CO2 26 05/23/2018    ALT 28 05/23/2018    ANIONGAP 10 05/23/2018    EGFRNONAA >60.0 05/23/2018    ESTGFRAFRICA >60.0 05/23/2018       LIPID  Lab Results   Component Value Date    CHOL 174 05/23/2018    HDL 63 05/23/2018         TSH  Lab Results   Component Value Date    TSH 1.930 05/23/2018       Current Outpatient Medications   Medication Sig Dispense Refill    methylphenidate HCl (RITALIN) 10 MG tablet 1.5 tabs po in am and 1.5 tabs po in pm 75 tablet 0    tretinoin (RETIN-A) 0.025 % cream Apply thin film to face qhs then moisturize 45 g 6    valACYclovir (VALTREX) 1000 MG tablet Take 1 tablet (1,000 mg total) by mouth 2 (two) times daily. 60 tablet 6     No current facility-administered medications for this visit.        Assessment:    ICD-10-CM ICD-9-CM    1. URI, acute J06.9 465.9    2. Attention deficit disorder (ADD) in adult F98.8 314.00 methylphenidate HCl (RITALIN) 10 MG tablet         Plan:  Pt instructed to use anti histamine and salt water gargles  Follow-up in 1 month (on 1/31/2019).          Linda Pineda MD

## 2019-02-01 ENCOUNTER — OFFICE VISIT (OUTPATIENT)
Dept: FAMILY MEDICINE | Facility: CLINIC | Age: 39
End: 2019-02-01
Payer: COMMERCIAL

## 2019-02-01 VITALS
WEIGHT: 125 LBS | SYSTOLIC BLOOD PRESSURE: 118 MMHG | OXYGEN SATURATION: 99 % | HEIGHT: 62 IN | TEMPERATURE: 98 F | HEART RATE: 77 BPM | DIASTOLIC BLOOD PRESSURE: 72 MMHG | BODY MASS INDEX: 23 KG/M2 | RESPIRATION RATE: 18 BRPM

## 2019-02-01 DIAGNOSIS — F98.8 ATTENTION DEFICIT DISORDER (ADD) IN ADULT: ICD-10-CM

## 2019-02-01 LAB
AMPHET+METHAMPHET UR QL: NEGATIVE
BARBITURATES UR QL SCN>200 NG/ML: NEGATIVE
BENZODIAZ UR QL SCN>200 NG/ML: NEGATIVE
BZE UR QL SCN: NEGATIVE
CANNABINOIDS UR QL SCN: NEGATIVE
CREAT UR-MCNC: 17 MG/DL
ETHANOL UR-MCNC: <10 MG/DL
METHADONE UR QL SCN>300 NG/ML: NEGATIVE
OPIATES UR QL SCN: NEGATIVE
PCP UR QL SCN>25 NG/ML: NEGATIVE
TOXICOLOGY INFORMATION: NORMAL

## 2019-02-01 PROCEDURE — 99999 PR PBB SHADOW E&M-EST. PATIENT-LVL IV: ICD-10-PCS | Mod: PBBFAC,,, | Performed by: FAMILY MEDICINE

## 2019-02-01 PROCEDURE — 99214 OFFICE O/P EST MOD 30 MIN: CPT | Mod: S$GLB,,, | Performed by: FAMILY MEDICINE

## 2019-02-01 PROCEDURE — 99214 PR OFFICE/OUTPT VISIT, EST, LEVL IV, 30-39 MIN: ICD-10-PCS | Mod: S$GLB,,, | Performed by: FAMILY MEDICINE

## 2019-02-01 PROCEDURE — 3008F BODY MASS INDEX DOCD: CPT | Mod: CPTII,S$GLB,, | Performed by: FAMILY MEDICINE

## 2019-02-01 PROCEDURE — 3008F PR BODY MASS INDEX (BMI) DOCUMENTED: ICD-10-PCS | Mod: CPTII,S$GLB,, | Performed by: FAMILY MEDICINE

## 2019-02-01 PROCEDURE — 99999 PR PBB SHADOW E&M-EST. PATIENT-LVL IV: CPT | Mod: PBBFAC,,, | Performed by: FAMILY MEDICINE

## 2019-02-01 PROCEDURE — 80307 DRUG TEST PRSMV CHEM ANLYZR: CPT

## 2019-02-01 RX ORDER — METHYLPHENIDATE HYDROCHLORIDE 10 MG/1
TABLET ORAL
Qty: 75 TABLET | Refills: 0 | Status: SHIPPED | OUTPATIENT
Start: 2019-02-01 | End: 2019-02-08 | Stop reason: SDUPTHER

## 2019-02-01 NOTE — PROGRESS NOTES
HPI:  Karolyn Campbell is a 38 y.o. year old female that  presents fro f/u of ADHD. She is pleased with the 15mg of Ritalin. She does not feel differently when she is taking it and she counts that as a good thing. She does feel her heart palpitating if she takes an OTC antihistamine with decongestant.   Chief Complaint   Patient presents with    Medication Refill     ritalin   .           Past Medical History:   Diagnosis Date    Abnormal Pap smear of cervix     prior to being a pt     Allergy     Anxiety     Anxiety     HPV (human papilloma virus) infection     Hyperlipemia     Migraine      Social History     Socioeconomic History    Marital status:      Spouse name: Not on file    Number of children: Not on file    Years of education: Not on file    Highest education level: Not on file   Social Needs    Financial resource strain: Not on file    Food insecurity - worry: Not on file    Food insecurity - inability: Not on file    Transportation needs - medical: Not on file    Transportation needs - non-medical: Not on file   Occupational History    Not on file   Tobacco Use    Smoking status: Former Smoker    Smokeless tobacco: Never Used   Substance and Sexual Activity    Alcohol use: Yes    Drug use: No    Sexual activity: Yes     Partners: Male     Birth control/protection: Condom   Other Topics Concern    Are you pregnant or think you may be? No    Breast-feeding No   Social History Narrative    Lives with her  in [place] with their 2 children. She is a  at [employer]. Her oldest sister was molested as a child. Her sister has alcohol and psychological problems. Her father is . She helps to care for her mother. She is spiritual. She does Elizabeth and yoga. She drinks one cup of coffee a day.      Past Surgical History:   Procedure Laterality Date    BREAST SURGERY       SECTION      replacement of  breast implant       due to  "rupture     RHINOPLASTY  2007     Family History   Adopted: Yes   Problem Relation Age of Onset    No Known Problems Daughter     ADD / ADHD Son     Strabismus Son            Review of Systems  General ROS: negative for chills, fever or weight loss  ENT ROS: negative for epistaxis, sore throat or rhinorrhea  Respiratory ROS: no cough, shortness of breath, or wheezing  Cardiovascular ROS: no chest pain or dyspnea on exertion  Gastrointestinal ROS: no abdominal pain, change in bowel habits, or black/ bloody stools    Physical Exam:  /72   Pulse 77   Temp 98.1 °F (36.7 °C) (Oral)   Resp 18   Ht 5' 2" (1.575 m)   Wt 56.7 kg (125 lb)   LMP 01/15/2019 (Exact Date)   SpO2 99%   BMI 22.86 kg/m²   General appearance: alert, cooperative, no distress  Constitutional:Oriented to person, place, and time.appears well-developed and well-nourished.  HEENT: Normocephalic, atraumatic, neck symmetrical, no nasal discharge, TM- clear bilaterally  Lungs: clear to auscultation bilaterally, no dullness to percussion bilaterally  Heart: regular rate and rhythm without rub; no displacement of the PMI , S1&S2 present  Abdomen: soft, non-tender; bowel sounds normoactive; no organomegaly  Physical Exam      LABS:    Complete Blood Count  Lab Results   Component Value Date    RBC 4.72 05/23/2018    HGB 14.1 05/23/2018    HCT 43.3 05/23/2018    MCV 92 05/23/2018    MCH 29.9 05/23/2018    MCHC 32.6 05/23/2018    RDW 12.5 05/23/2018     05/23/2018    MPV 10.0 05/23/2018    GRAN 2.5 05/23/2018    GRAN 55.3 05/23/2018    LYMPH 1.4 05/23/2018    LYMPH 31.5 05/23/2018    MONO 0.4 05/23/2018    MONO 9.4 05/23/2018    EOS 0.2 05/23/2018    BASO 0.02 05/23/2018    EOSINOPHIL 3.4 05/23/2018    BASOPHIL 0.4 05/23/2018    DIFFMETHOD Automated 05/23/2018       Comprehensive Metabolic Panel  Lab Results   Component Value Date     05/23/2018    BUN 24 (H) 05/23/2018    CREATININE 0.62 05/23/2018     05/23/2018    K 4.9 " 05/23/2018     05/23/2018    PROT 7.6 05/23/2018    ALBUMIN 4.4 05/23/2018    BILITOT 0.2 05/23/2018    AST 25 05/23/2018    ALKPHOS 52 05/23/2018    CO2 26 05/23/2018    ALT 28 05/23/2018    ANIONGAP 10 05/23/2018    EGFRNONAA >60.0 05/23/2018    ESTGFRAFRICA >60.0 05/23/2018       LIPID  Lab Results   Component Value Date    CHOL 174 05/23/2018    HDL 63 05/23/2018         TSH  Lab Results   Component Value Date    TSH 1.930 05/23/2018       Current Outpatient Medications   Medication Sig Dispense Refill    methylphenidate HCl (RITALIN) 10 MG tablet 1.5 tabs po in am and 1.5 tabs po in pm 75 tablet 0    tretinoin (RETIN-A) 0.025 % cream Apply thin film to face qhs then moisturize 45 g 6    valACYclovir (VALTREX) 1000 MG tablet Take 1 tablet (1,000 mg total) by mouth 2 (two) times daily. 60 tablet 6     No current facility-administered medications for this visit.        Assessment:    ICD-10-CM ICD-9-CM    1. Attention deficit disorder (ADD) in adult F98.8 314.00 methylphenidate HCl (RITALIN) 10 MG tablet      TOXICOLOGY SCREEN, URINE, RANDOM (COMPLIANCE)         Plan:  Continue current dose of Ritalin 15 mg daily  Follow-up in 1 month (on 3/1/2019).          Linda Pineda MD

## 2019-02-06 ENCOUNTER — PATIENT MESSAGE (OUTPATIENT)
Dept: FAMILY MEDICINE | Facility: CLINIC | Age: 39
End: 2019-02-06

## 2019-02-08 DIAGNOSIS — F98.8 ATTENTION DEFICIT DISORDER (ADD) IN ADULT: ICD-10-CM

## 2019-02-08 RX ORDER — METHYLPHENIDATE HYDROCHLORIDE 10 MG/1
TABLET ORAL
Qty: 15 TABLET | Refills: 0 | Status: SHIPPED | OUTPATIENT
Start: 2019-02-08 | End: 2019-02-27 | Stop reason: SDUPTHER

## 2019-02-27 ENCOUNTER — OFFICE VISIT (OUTPATIENT)
Dept: FAMILY MEDICINE | Facility: CLINIC | Age: 39
End: 2019-02-27
Payer: COMMERCIAL

## 2019-02-27 VITALS
WEIGHT: 125.31 LBS | HEIGHT: 62 IN | BODY MASS INDEX: 23.06 KG/M2 | TEMPERATURE: 99 F | DIASTOLIC BLOOD PRESSURE: 80 MMHG | OXYGEN SATURATION: 98 % | SYSTOLIC BLOOD PRESSURE: 100 MMHG | HEART RATE: 82 BPM | RESPIRATION RATE: 16 BRPM

## 2019-02-27 DIAGNOSIS — F98.8 ATTENTION DEFICIT DISORDER (ADD) IN ADULT: ICD-10-CM

## 2019-02-27 PROCEDURE — 99214 OFFICE O/P EST MOD 30 MIN: CPT | Mod: S$GLB,,, | Performed by: FAMILY MEDICINE

## 2019-02-27 PROCEDURE — 3008F PR BODY MASS INDEX (BMI) DOCUMENTED: ICD-10-PCS | Mod: CPTII,S$GLB,, | Performed by: FAMILY MEDICINE

## 2019-02-27 PROCEDURE — 99999 PR PBB SHADOW E&M-EST. PATIENT-LVL III: ICD-10-PCS | Mod: PBBFAC,,, | Performed by: FAMILY MEDICINE

## 2019-02-27 PROCEDURE — 99214 PR OFFICE/OUTPT VISIT, EST, LEVL IV, 30-39 MIN: ICD-10-PCS | Mod: S$GLB,,, | Performed by: FAMILY MEDICINE

## 2019-02-27 PROCEDURE — 3008F BODY MASS INDEX DOCD: CPT | Mod: CPTII,S$GLB,, | Performed by: FAMILY MEDICINE

## 2019-02-27 PROCEDURE — 99999 PR PBB SHADOW E&M-EST. PATIENT-LVL III: CPT | Mod: PBBFAC,,, | Performed by: FAMILY MEDICINE

## 2019-02-27 RX ORDER — METHYLPHENIDATE HYDROCHLORIDE 20 MG/1
TABLET ORAL
Qty: 30 TABLET | Refills: 0 | Status: SHIPPED | OUTPATIENT
Start: 2019-02-27 | End: 2019-02-27 | Stop reason: SDUPTHER

## 2019-02-27 RX ORDER — METHYLPHENIDATE HYDROCHLORIDE 20 MG/1
TABLET ORAL
Qty: 20 TABLET | Refills: 0 | Status: SHIPPED | OUTPATIENT
Start: 2019-02-27 | End: 2019-02-27 | Stop reason: SDUPTHER

## 2019-02-27 RX ORDER — METHYLPHENIDATE HYDROCHLORIDE 20 MG/1
TABLET ORAL
Qty: 60 TABLET | Refills: 0 | Status: SHIPPED | OUTPATIENT
Start: 2019-02-27 | End: 2019-03-15 | Stop reason: SDUPTHER

## 2019-02-27 RX ORDER — METHYLPHENIDATE HYDROCHLORIDE 10 MG/1
TABLET ORAL
Qty: 15 TABLET | Refills: 0 | Status: SHIPPED | OUTPATIENT
Start: 2019-02-27 | End: 2019-02-27 | Stop reason: SDUPTHER

## 2019-02-27 NOTE — PROGRESS NOTES
HPI:  Karolyn Campbell is a 38 y.o. year old female that  presents for med refill. She states that she has had a little anxiety with the 20 mg of Adderall and is concerned about possibly becoming immune to the dose.   Chief Complaint   Patient presents with    ADD     F/U-refill on medication   .           Past Medical History:   Diagnosis Date    Abnormal Pap smear of cervix     prior to being a pt     ADHD (attention deficit hyperactivity disorder)     Allergy     Anxiety     Anxiety     HPV (human papilloma virus) infection     Hyperlipemia     Migraine      Social History     Socioeconomic History    Marital status:      Spouse name: Not on file    Number of children: Not on file    Years of education: Not on file    Highest education level: Not on file   Social Needs    Financial resource strain: Not on file    Food insecurity - worry: Not on file    Food insecurity - inability: Not on file    Transportation needs - medical: Not on file    Transportation needs - non-medical: Not on file   Occupational History    Not on file   Tobacco Use    Smoking status: Former Smoker    Smokeless tobacco: Never Used   Substance and Sexual Activity    Alcohol use: Yes    Drug use: No    Sexual activity: Yes     Partners: Male     Birth control/protection: Condom   Other Topics Concern    Are you pregnant or think you may be? No    Breast-feeding No   Social History Narrative    Lives with her  in [place] with their 2 children. She is a  at [employer]. Her oldest sister was molested as a child. Her sister has alcohol and psychological problems. Her father is . She helps to care for her mother. She is spiritual. She does Elizabeth and yoga. She drinks one cup of coffee a day.      Past Surgical History:   Procedure Laterality Date    BREAST SURGERY       SECTION      replacement of  breast implant       due to rupture     RHINOPLASTY    "    Family History   Adopted: Yes   Problem Relation Age of Onset    No Known Problems Daughter     ADD / ADHD Son     Strabismus Son            Review of Systems  General ROS: negative for chills, fever or weight loss  ENT ROS: negative for epistaxis, sore throat or rhinorrhea  Respiratory ROS: no cough, shortness of breath, or wheezing  Cardiovascular ROS: no chest pain or dyspnea on exertion  Gastrointestinal ROS: no abdominal pain, change in bowel habits, or black/ bloody stools    Physical Exam:  /80 (BP Location: Right arm, Patient Position: Sitting, BP Method: Large (Manual))   Pulse 82   Temp 98.5 °F (36.9 °C) (Oral)   Resp 16   Ht 5' 2" (1.575 m)   Wt 56.8 kg (125 lb 5.3 oz)   LMP 02/13/2019   SpO2 98%   BMI 22.92 kg/m²   General appearance: alert, cooperative, no distress  Constitutional:Oriented to person, place, and time.appears well-developed and well-nourished.  Lungs: clear to auscultation bilaterally, no dullness to percussion bilaterally  Heart: regular rate and rhythm without rub; no displacement of the PMI , S1&S2 present    Physical Exam      LABS:    Complete Blood Count  Lab Results   Component Value Date    RBC 4.72 05/23/2018    HGB 14.1 05/23/2018    HCT 43.3 05/23/2018    MCV 92 05/23/2018    MCH 29.9 05/23/2018    MCHC 32.6 05/23/2018    RDW 12.5 05/23/2018     05/23/2018    MPV 10.0 05/23/2018    GRAN 2.5 05/23/2018    GRAN 55.3 05/23/2018    LYMPH 1.4 05/23/2018    LYMPH 31.5 05/23/2018    MONO 0.4 05/23/2018    MONO 9.4 05/23/2018    EOS 0.2 05/23/2018    BASO 0.02 05/23/2018    EOSINOPHIL 3.4 05/23/2018    BASOPHIL 0.4 05/23/2018    DIFFMETHOD Automated 05/23/2018       Comprehensive Metabolic Panel  Lab Results   Component Value Date     05/23/2018    BUN 24 (H) 05/23/2018    CREATININE 0.62 05/23/2018     05/23/2018    K 4.9 05/23/2018     05/23/2018    PROT 7.6 05/23/2018    ALBUMIN 4.4 05/23/2018    BILITOT 0.2 05/23/2018    AST 25 " 05/23/2018    ALKPHOS 52 05/23/2018    CO2 26 05/23/2018    ALT 28 05/23/2018    ANIONGAP 10 05/23/2018    EGFRNONAA >60.0 05/23/2018    ESTGFRAFRICA >60.0 05/23/2018       LIPID  Lab Results   Component Value Date    CHOL 174 05/23/2018    HDL 63 05/23/2018         TSH  Lab Results   Component Value Date    TSH 1.930 05/23/2018       Current Outpatient Medications   Medication Sig Dispense Refill    methylphenidate HCl (RITALIN) 20 MG tablet 1 tabs po in am and 1 tabs po in pm 60 tablet 0    tretinoin (RETIN-A) 0.025 % cream Apply thin film to face qhs then moisturize 45 g 6    valACYclovir (VALTREX) 1000 MG tablet Take 1 tablet (1,000 mg total) by mouth 2 (two) times daily. 60 tablet 6     No current facility-administered medications for this visit.        Assessment:    ICD-10-CM ICD-9-CM    1. Attention deficit disorder (ADD) in adult F98.8 314.00 methylphenidate HCl (RITALIN) 20 MG tablet                           Plan:    Follow-up in 1 month (on 3/27/2019).          Linda Pineda MD

## 2019-03-15 ENCOUNTER — OFFICE VISIT (OUTPATIENT)
Dept: FAMILY MEDICINE | Facility: CLINIC | Age: 39
End: 2019-03-15
Payer: COMMERCIAL

## 2019-03-15 VITALS
HEART RATE: 91 BPM | OXYGEN SATURATION: 99 % | BODY MASS INDEX: 23.3 KG/M2 | WEIGHT: 126.63 LBS | HEIGHT: 62 IN | TEMPERATURE: 99 F | DIASTOLIC BLOOD PRESSURE: 78 MMHG | RESPIRATION RATE: 16 BRPM | SYSTOLIC BLOOD PRESSURE: 108 MMHG

## 2019-03-15 DIAGNOSIS — Z00.00 ANNUAL PHYSICAL EXAM: ICD-10-CM

## 2019-03-15 DIAGNOSIS — F98.8 ATTENTION DEFICIT DISORDER (ADD) IN ADULT: Primary | ICD-10-CM

## 2019-03-15 PROCEDURE — 99214 OFFICE O/P EST MOD 30 MIN: CPT | Mod: S$GLB,,, | Performed by: FAMILY MEDICINE

## 2019-03-15 PROCEDURE — 99999 PR PBB SHADOW E&M-EST. PATIENT-LVL III: ICD-10-PCS | Mod: PBBFAC,,, | Performed by: FAMILY MEDICINE

## 2019-03-15 PROCEDURE — 3008F BODY MASS INDEX DOCD: CPT | Mod: CPTII,S$GLB,, | Performed by: FAMILY MEDICINE

## 2019-03-15 PROCEDURE — 3008F PR BODY MASS INDEX (BMI) DOCUMENTED: ICD-10-PCS | Mod: CPTII,S$GLB,, | Performed by: FAMILY MEDICINE

## 2019-03-15 PROCEDURE — 99999 PR PBB SHADOW E&M-EST. PATIENT-LVL III: CPT | Mod: PBBFAC,,, | Performed by: FAMILY MEDICINE

## 2019-03-15 PROCEDURE — 99214 PR OFFICE/OUTPT VISIT, EST, LEVL IV, 30-39 MIN: ICD-10-PCS | Mod: S$GLB,,, | Performed by: FAMILY MEDICINE

## 2019-03-15 RX ORDER — METHYLPHENIDATE HYDROCHLORIDE 20 MG/1
TABLET ORAL
Qty: 60 TABLET | Refills: 0 | Status: SHIPPED | OUTPATIENT
Start: 2019-03-15 | End: 2019-04-24 | Stop reason: SDUPTHER

## 2019-03-15 NOTE — PROGRESS NOTES
HPI:  Karolyn Campbell is a 38 y.o. year old female that  presents for f/u of Ritalin. She did fine with increasing the dose to 20 mg bid and takes the second dose right after lunch. Her sleep is not disturbed and has a good appetite. Her heart fluttering has resolved for  The most part. She feels as though this dosage is very effective.   Chief Complaint   Patient presents with    Medication Refill   .           Past Medical History:   Diagnosis Date    Abnormal Pap smear of cervix     prior to being a pt     ADHD (attention deficit hyperactivity disorder)     Allergy     Anxiety     Anxiety     HPV (human papilloma virus) infection     Hyperlipemia     Migraine      Social History     Socioeconomic History    Marital status:      Spouse name: Not on file    Number of children: Not on file    Years of education: Not on file    Highest education level: Not on file   Social Needs    Financial resource strain: Not on file    Food insecurity - worry: Not on file    Food insecurity - inability: Not on file    Transportation needs - medical: Not on file    Transportation needs - non-medical: Not on file   Occupational History    Not on file   Tobacco Use    Smoking status: Former Smoker    Smokeless tobacco: Never Used   Substance and Sexual Activity    Alcohol use: Yes    Drug use: No    Sexual activity: Yes     Partners: Male     Birth control/protection: Condom   Other Topics Concern    Are you pregnant or think you may be? No    Breast-feeding No   Social History Narrative    Lives with her  in [place] with their 2 children. She is a  at [employer]. Her oldest sister was molested as a child. Her sister has alcohol and psychological problems. Her father is . She helps to care for her mother. She is spiritual. She does Elizabeth and yoga. She drinks one cup of coffee a day.      Past Surgical History:   Procedure Laterality Date    BREAST SURGERY       " SECTION  2010    replacement of  breast implant       due to rupture     RHINOPLASTY       Family History   Adopted: Yes   Problem Relation Age of Onset    No Known Problems Daughter     ADD / ADHD Son     Strabismus Son            Review of Systems  General ROS: negative for chills, fever or weight loss  ENT ROS: negative for epistaxis, sore throat or rhinorrhea  Respiratory ROS: no cough, shortness of breath, or wheezing  Cardiovascular ROS: no chest pain or dyspnea on exertion  Gastrointestinal ROS: no abdominal pain, change in bowel habits, or black/ bloody stools    Physical Exam:  /78 (BP Location: Left arm, Patient Position: Sitting, BP Method: Large (Manual))   Pulse 91   Temp 98.6 °F (37 °C) (Oral)   Resp 16   Ht 5' 2" (1.575 m)   Wt 57.5 kg (126 lb 10.5 oz)   LMP 2019   SpO2 99%   BMI 23.17 kg/m²   General appearance: alert, cooperative, no distress  Constitutional:Oriented to person, place, and time.appears well-developed and well-nourished.  Lungs: clear to auscultation bilaterally, no dullness to percussion bilaterally  Heart: regular rate and rhythm without rub; no displacement of the PMI , S1&S2 present    Physical Exam      LABS:    Complete Blood Count  Lab Results   Component Value Date    RBC 4.72 2018    HGB 14.1 2018    HCT 43.3 2018    MCV 92 2018    MCH 29.9 2018    MCHC 32.6 2018    RDW 12.5 2018     2018    MPV 10.0 2018    GRAN 2.5 2018    GRAN 55.3 2018    LYMPH 1.4 2018    LYMPH 31.5 2018    MONO 0.4 2018    MONO 9.4 2018    EOS 0.2 2018    BASO 0.02 2018    EOSINOPHIL 3.4 2018    BASOPHIL 0.4 2018    DIFFMETHOD Automated 2018       Comprehensive Metabolic Panel  Lab Results   Component Value Date     2018    BUN 24 (H) 2018    CREATININE 0.62 2018     2018    K 4.9 2018    "  05/23/2018    PROT 7.6 05/23/2018    ALBUMIN 4.4 05/23/2018    BILITOT 0.2 05/23/2018    AST 25 05/23/2018    ALKPHOS 52 05/23/2018    CO2 26 05/23/2018    ALT 28 05/23/2018    ANIONGAP 10 05/23/2018    EGFRNONAA >60.0 05/23/2018    ESTGFRAFRICA >60.0 05/23/2018       LIPID  Lab Results   Component Value Date    CHOL 174 05/23/2018    HDL 63 05/23/2018         TSH  Lab Results   Component Value Date    TSH 1.930 05/23/2018       Current Outpatient Medications   Medication Sig Dispense Refill    methylphenidate HCl (RITALIN) 20 MG tablet 1 tabs po in am and 1 tabs po in pm 60 tablet 0    tretinoin (RETIN-A) 0.025 % cream Apply thin film to face qhs then moisturize 45 g 6    valACYclovir (VALTREX) 1000 MG tablet Take 1 tablet (1,000 mg total) by mouth 2 (two) times daily. 60 tablet 6     No current facility-administered medications for this visit.        Assessment:    ICD-10-CM ICD-9-CM    1. Attention deficit disorder (ADD) in adult F98.8 314.00 methylphenidate HCl (RITALIN) 20 MG tablet   2. Annual physical exam Z00.00 V70.0 Comprehensive metabolic panel      Lipid panel      CBC auto differential      TSH         Plan:  Pre visit annual labs were ordered for May visit  Follow-up in 1 month (on 4/15/2019).          Linda Pineda MD

## 2019-04-24 ENCOUNTER — OFFICE VISIT (OUTPATIENT)
Dept: FAMILY MEDICINE | Facility: CLINIC | Age: 39
End: 2019-04-24
Payer: COMMERCIAL

## 2019-04-24 VITALS
SYSTOLIC BLOOD PRESSURE: 102 MMHG | WEIGHT: 127.75 LBS | BODY MASS INDEX: 23.37 KG/M2 | DIASTOLIC BLOOD PRESSURE: 68 MMHG | TEMPERATURE: 99 F | OXYGEN SATURATION: 99 % | HEART RATE: 68 BPM

## 2019-04-24 DIAGNOSIS — F98.8 ATTENTION DEFICIT DISORDER (ADD) IN ADULT: ICD-10-CM

## 2019-04-24 PROCEDURE — 99214 OFFICE O/P EST MOD 30 MIN: CPT | Mod: S$GLB,,, | Performed by: FAMILY MEDICINE

## 2019-04-24 PROCEDURE — 3008F PR BODY MASS INDEX (BMI) DOCUMENTED: ICD-10-PCS | Mod: CPTII,S$GLB,, | Performed by: FAMILY MEDICINE

## 2019-04-24 PROCEDURE — 99999 PR PBB SHADOW E&M-EST. PATIENT-LVL III: CPT | Mod: PBBFAC,,, | Performed by: FAMILY MEDICINE

## 2019-04-24 PROCEDURE — 3008F BODY MASS INDEX DOCD: CPT | Mod: CPTII,S$GLB,, | Performed by: FAMILY MEDICINE

## 2019-04-24 PROCEDURE — 99999 PR PBB SHADOW E&M-EST. PATIENT-LVL III: ICD-10-PCS | Mod: PBBFAC,,, | Performed by: FAMILY MEDICINE

## 2019-04-24 PROCEDURE — 99214 PR OFFICE/OUTPT VISIT, EST, LEVL IV, 30-39 MIN: ICD-10-PCS | Mod: S$GLB,,, | Performed by: FAMILY MEDICINE

## 2019-04-24 RX ORDER — METHYLPHENIDATE HYDROCHLORIDE 20 MG/1
TABLET ORAL
Qty: 60 TABLET | Refills: 0 | Status: SHIPPED | OUTPATIENT
Start: 2019-04-24 | End: 2019-05-20 | Stop reason: SDUPTHER

## 2019-04-24 NOTE — PROGRESS NOTES
HPI:  Karolyn Campbell is a 38 y.o. year old female that  presents for follow-up of attention deficit and med refill.  Patient doing well and enjoys time a from school with family.  Denies any episodes abnormal affect were medication side effects.  No chest pains or abdominal pains.  Good sleep pattern noted    Chief Complaint   Patient presents with    Follow-up    Medication Refill   .           Past Medical History:   Diagnosis Date    Abnormal Pap smear of cervix 2000    prior to being a pt     ADHD (attention deficit hyperactivity disorder)     Allergy     Anxiety     Anxiety     HPV (human papilloma virus) infection     Hyperlipemia     Migraine      Social History     Socioeconomic History    Marital status:      Spouse name: Not on file    Number of children: Not on file    Years of education: Not on file    Highest education level: Not on file   Occupational History    Not on file   Social Needs    Financial resource strain: Not on file    Food insecurity:     Worry: Not on file     Inability: Not on file    Transportation needs:     Medical: Not on file     Non-medical: Not on file   Tobacco Use    Smoking status: Former Smoker    Smokeless tobacco: Never Used   Substance and Sexual Activity    Alcohol use: Yes    Drug use: No    Sexual activity: Yes     Partners: Male     Birth control/protection: Condom   Lifestyle    Physical activity:     Days per week: Not on file     Minutes per session: Not on file    Stress: Not on file   Relationships    Social connections:     Talks on phone: Not on file     Gets together: Not on file     Attends Mosque service: Not on file     Active member of club or organization: Not on file     Attends meetings of clubs or organizations: Not on file     Relationship status: Not on file   Other Topics Concern    Are you pregnant or think you may be? No    Breast-feeding No   Social History Narrative    Lives with her  in [place] with  their 2 children. She is a  at [employer]. Her oldest sister was molested as a child. Her sister has alcohol and psychological problems. Her father is . She helps to care for her mother. She is spiritual. She does Elizabeth and yoga. She drinks one cup of coffee a day.      Past Surgical History:   Procedure Laterality Date    BREAST SURGERY  2003     SECTION      replacement of  breast implant       due to rupture     RHINOPLASTY       Family History   Adopted: Yes   Problem Relation Age of Onset    No Known Problems Daughter     ADD / ADHD Son     Strabismus Son            Review of Systems  General ROS: negative for chills, fever or weight loss  ENT ROS: negative for epistaxis, sore throat or rhinorrhea  Respiratory ROS: no cough, shortness of breath, or wheezing  Cardiovascular ROS: no chest pain or dyspnea on exertion  Gastrointestinal ROS: no abdominal pain, change in bowel habits, or black/ bloody stools    Physical Exam:  /68   Pulse 68   Temp 98.6 °F (37 °C) (Oral)   Wt 58 kg (127 lb 12.1 oz)   LMP 2019   SpO2 99%   BMI 23.37 kg/m²   General appearance: alert, cooperative, no distress  Constitutional:Oriented to person, place, and time.appears well-developed and well-nourished.  Lungs: clear to auscultation bilaterally, no dullness to percussion bilaterally  Heart: regular rate and rhythm without rub; no displacement of the PMI , S1&S2 present    Physical Exam      LABS:    Complete Blood Count  Lab Results   Component Value Date    RBC 4.72 2018    HGB 14.1 2018    HCT 43.3 2018    MCV 92 2018    MCH 29.9 2018    MCHC 32.6 2018    RDW 12.5 2018     2018    MPV 10.0 2018    GRAN 2.5 2018    GRAN 55.3 2018    LYMPH 1.4 2018    LYMPH 31.5 2018    MONO 0.4 2018    MONO 9.4 2018    EOS 0.2 2018    BASO 0.02 2018    EOSINOPHIL 3.4  05/23/2018    BASOPHIL 0.4 05/23/2018    DIFFMETHOD Automated 05/23/2018       Comprehensive Metabolic Panel  Lab Results   Component Value Date     05/23/2018    BUN 24 (H) 05/23/2018    CREATININE 0.62 05/23/2018     05/23/2018    K 4.9 05/23/2018     05/23/2018    PROT 7.6 05/23/2018    ALBUMIN 4.4 05/23/2018    BILITOT 0.2 05/23/2018    AST 25 05/23/2018    ALKPHOS 52 05/23/2018    CO2 26 05/23/2018    ALT 28 05/23/2018    ANIONGAP 10 05/23/2018    EGFRNONAA >60.0 05/23/2018    ESTGFRAFRICA >60.0 05/23/2018       LIPID  Lab Results   Component Value Date    CHOL 174 05/23/2018    HDL 63 05/23/2018         TSH  Lab Results   Component Value Date    TSH 1.930 05/23/2018       Current Outpatient Medications   Medication Sig Dispense Refill    methylphenidate HCl (RITALIN) 20 MG tablet 1 tabs po in am and 1 tabs po in pm 60 tablet 0    tretinoin (RETIN-A) 0.025 % cream Apply thin film to face qhs then moisturize 45 g 6    valACYclovir (VALTREX) 1000 MG tablet Take 1 tablet (1,000 mg total) by mouth 2 (two) times daily. 60 tablet 6     No current facility-administered medications for this visit.        Assessment:    ICD-10-CM ICD-9-CM    1. Attention deficit disorder (ADD) in adult F98.8 314.00 methylphenidate HCl (RITALIN) 20 MG tablet         Plan:  Annual wellness exam scheduled for next visit with lab work  Follow up in 1 month (on 5/24/2019) for Annual Well Visit.          Linda Pineda MD

## 2019-04-29 ENCOUNTER — OFFICE VISIT (OUTPATIENT)
Dept: OBSTETRICS AND GYNECOLOGY | Facility: CLINIC | Age: 39
End: 2019-04-29
Payer: COMMERCIAL

## 2019-04-29 VITALS
DIASTOLIC BLOOD PRESSURE: 72 MMHG | HEIGHT: 62 IN | WEIGHT: 125.69 LBS | SYSTOLIC BLOOD PRESSURE: 110 MMHG | BODY MASS INDEX: 23.13 KG/M2

## 2019-04-29 DIAGNOSIS — Z01.419 ENCOUNTER FOR GYNECOLOGICAL EXAMINATION: Primary | ICD-10-CM

## 2019-04-29 DIAGNOSIS — N92.6 IRREGULAR BLEEDING: ICD-10-CM

## 2019-04-29 DIAGNOSIS — Z12.39 SCREENING FOR BREAST CANCER: ICD-10-CM

## 2019-04-29 PROCEDURE — 99395 PREV VISIT EST AGE 18-39: CPT | Mod: S$GLB,,, | Performed by: OBSTETRICS & GYNECOLOGY

## 2019-04-29 PROCEDURE — 99999 PR PBB SHADOW E&M-EST. PATIENT-LVL III: CPT | Mod: PBBFAC,,, | Performed by: OBSTETRICS & GYNECOLOGY

## 2019-04-29 PROCEDURE — 87491 CHLMYD TRACH DNA AMP PROBE: CPT

## 2019-04-29 PROCEDURE — 99999 PR PBB SHADOW E&M-EST. PATIENT-LVL III: ICD-10-PCS | Mod: PBBFAC,,, | Performed by: OBSTETRICS & GYNECOLOGY

## 2019-04-29 PROCEDURE — 87624 HPV HI-RISK TYP POOLED RSLT: CPT

## 2019-04-29 PROCEDURE — 88175 CYTOPATH C/V AUTO FLUID REDO: CPT

## 2019-04-29 PROCEDURE — 99395 PR PREVENTIVE VISIT,EST,18-39: ICD-10-PCS | Mod: S$GLB,,, | Performed by: OBSTETRICS & GYNECOLOGY

## 2019-04-29 NOTE — PROGRESS NOTES
Chief Complaint: well woman exam  Well Woman (Annual Exam, Last pap/hpv  negative. Baseline mammo 6-15-18 negative. C/o mid-cycle spotting x 3 mnths)      Karolyn Campbell is a 38 y.o. female  presents for a well woman exam.    She is established.  For the past 3 months patient has had midcycle spotting seeming to occur around day 15 or 16 of her menstrual cycle.    Cycles:  Regular and monthly inner lighter than usual.  LMP: Patient's last menstrual period was 2019..    Contraception: The current method of family planning is condoms   Last pap: 2017 normal HPV negative  Last MM/18 normal      Medication List with Changes/Refills   Current Medications    METHYLPHENIDATE HCL (RITALIN) 20 MG TABLET    1 tabs po in am and 1 tabs po in pm    TRETINOIN (RETIN-A) 0.025 % CREAM    Apply thin film to face qhs then moisturize    VALACYCLOVIR (VALTREX) 1000 MG TABLET    Take 1 tablet (1,000 mg total) by mouth 2 (two) times daily.       Past Medical History:   Diagnosis Date    Abnormal Pap smear of cervix     prior to being a pt     ADHD (attention deficit hyperactivity disorder)     Allergy     Anxiety     Anxiety     Fever blister     HPV (human papilloma virus) infection     Hyperlipemia     Migraine        Past Surgical History:   Procedure Laterality Date    BREAST SURGERY       SECTION      replacement of  breast implant       due to rupture     RHINOPLASTY         OB History    Para Term  AB Living   5 5 3 0 0 2   SAB TAB Ectopic Multiple Live Births   0 0 0 0 2      # Outcome Date GA Lbr Germain/2nd Weight Sex Delivery Anes PTL Lv   5 Para 2014    F CS-LTranv   MAX   4 Para 2010    M CS-LTranv   MAX   3 Term            2 Term            1 Term                Family History   Adopted: Yes   Problem Relation Age of Onset    No Known Problems Daughter     ADD / ADHD Son     Strabismus Son        Social History     Tobacco Use    Smoking  "status: Former Smoker    Smokeless tobacco: Never Used   Substance Use Topics    Alcohol use: Yes    Drug use: No         ROS:  GENERAL: Denies weight gain or weight loss. Feeling well overall.   SKIN: Denies rash or lesions.   HEENT: Denies headaches, or vision changes.   CARDIOVASCULAR: Denies palpitations or left sided chest pain.   RESPIRATORY: Denies shortness of breath or dyspnea on exertion.  BREASTS: Denies pain, lumps, or nipple discharge.   ABDOMEN: Denies abdominal pain, constipation, diarrhea, nausea, vomiting, change in appetite or rectal bleeding.   URINARY: Denies frequency, dysuria, hematuria.  NEUROLOGIC: Denies syncope or weakness.   PSYCHIATRIC: Denies depression, anxiety or mood swings.    Physical Exam:  /72   Ht 5' 2" (1.575 m)   Wt 57 kg (125 lb 10.6 oz)   LMP 04/08/2019   BMI 22.98 kg/m²     APPEARANCE: Well nourished, well developed, in no acute distress.  AFFECT: WNL, alert and oriented x 3  SKIN: No acne or hirsutism  BREASTS: Symmetrical, no skin changes.                     No nipple discharge. No palpable masses bilaterally  NODES: No inguinal nor axillary LAD  ABDOMEN: soft Non tender Non distended No masses  PELVIC:   Normal external genitalia without lesions.  Normal hair distribution.   Adequate perineal body, normal urethral meatus. No signif cystocele or rectocele.  Vagina moist and well rugated without lesions or discharge.    Cervix pink, without lesions, discharge or tenderness.     PAP  performed. GC and chlamydia cultures performed  Bimanual exam shows uterus to be normal size, regular, mobile and nontender.  Adnexa without masses or tenderness.    EXTREMITIES: No edema.        ASSESSMENT AND PLAN    Karolyn Miller was seen today for well woman.    Diagnoses and all orders for this visit:    Encounter for gynecological examination   Pap done today.  Mammogram due in September.    Irregular bleeding  -     Liquid-based pap smear, screening  -     HPV High Risk Genotypes, " PCR  -     C. trachomatis/N. gonorrhoeae by AMP DNA  -     US Pelvis Comp with Transvag NON-OB (xpd; Future   Patient with midcycle spotting over the last 3 months.  Cycle regular and monthly but has midcycle spotting around day 15 or 16 of each month.  Pap and cultures done today.  Will schedule ultrasound.  Patient would like to wait till June until school is out.  If all the above were negative most likely this is hormonal.  Patient willing to try OCPs.  Will re-evaluate once everything is back.        Follow up in about 1 year (around 4/29/2020).    Patient was counseled today on A.C.S. Pap guidelines and recommendations for yearly pelvic exams, mammograms and monthly self breast exams; to see her PCP for other health maintenance.     Patient encouraged to register for portal and results will be sent via portal.       Health Maintenance   Topic Date Due    TETANUS VACCINE  09/04/1997    Pap Smear with HPV Cotest  01/26/2020    Influenza Vaccine  Completed    Lipid Panel  Completed

## 2019-04-30 LAB
C TRACH DNA SPEC QL NAA+PROBE: NOT DETECTED
N GONORRHOEA DNA SPEC QL NAA+PROBE: NOT DETECTED

## 2019-05-06 LAB
HPV HR 12 DNA CVX QL NAA+PROBE: NEGATIVE
HPV16 AG SPEC QL: NEGATIVE
HPV18 DNA SPEC QL NAA+PROBE: NEGATIVE

## 2019-05-20 ENCOUNTER — OFFICE VISIT (OUTPATIENT)
Dept: FAMILY MEDICINE | Facility: CLINIC | Age: 39
End: 2019-05-20
Payer: COMMERCIAL

## 2019-05-20 VITALS
WEIGHT: 125.25 LBS | TEMPERATURE: 99 F | HEART RATE: 87 BPM | RESPIRATION RATE: 16 BRPM | BODY MASS INDEX: 23.05 KG/M2 | DIASTOLIC BLOOD PRESSURE: 76 MMHG | SYSTOLIC BLOOD PRESSURE: 94 MMHG | OXYGEN SATURATION: 99 % | HEIGHT: 62 IN

## 2019-05-20 DIAGNOSIS — F98.8 ATTENTION DEFICIT DISORDER (ADD) IN ADULT: ICD-10-CM

## 2019-05-20 PROCEDURE — 99999 PR PBB SHADOW E&M-EST. PATIENT-LVL IV: CPT | Mod: PBBFAC,,, | Performed by: FAMILY MEDICINE

## 2019-05-20 PROCEDURE — 3008F PR BODY MASS INDEX (BMI) DOCUMENTED: ICD-10-PCS | Mod: CPTII,S$GLB,, | Performed by: FAMILY MEDICINE

## 2019-05-20 PROCEDURE — 99999 PR PBB SHADOW E&M-EST. PATIENT-LVL IV: ICD-10-PCS | Mod: PBBFAC,,, | Performed by: FAMILY MEDICINE

## 2019-05-20 PROCEDURE — 99214 OFFICE O/P EST MOD 30 MIN: CPT | Mod: S$GLB,,, | Performed by: FAMILY MEDICINE

## 2019-05-20 PROCEDURE — 99214 PR OFFICE/OUTPT VISIT, EST, LEVL IV, 30-39 MIN: ICD-10-PCS | Mod: S$GLB,,, | Performed by: FAMILY MEDICINE

## 2019-05-20 PROCEDURE — 3008F BODY MASS INDEX DOCD: CPT | Mod: CPTII,S$GLB,, | Performed by: FAMILY MEDICINE

## 2019-05-20 RX ORDER — METHYLPHENIDATE HYDROCHLORIDE 20 MG/1
TABLET ORAL
Qty: 60 TABLET | Refills: 0 | Status: SHIPPED | OUTPATIENT
Start: 2019-05-20 | End: 2019-06-20 | Stop reason: SDUPTHER

## 2019-05-20 NOTE — PROGRESS NOTES
HPI:  Karolyn Campbell is a 38 y.o. year old female that  presents fro medication refill. She denies any complaints.  Chief Complaint   Patient presents with    Results     F/U Labs    ADD     refill on medication   .           Past Medical History:   Diagnosis Date    Abnormal Pap smear of cervix 2000    prior to being a pt     ADHD (attention deficit hyperactivity disorder)     Allergy     Anxiety     Anxiety     Fever blister     HPV (human papilloma virus) infection     Hyperlipemia     Migraine      Social History     Socioeconomic History    Marital status:      Spouse name: Not on file    Number of children: Not on file    Years of education: Not on file    Highest education level: Not on file   Occupational History    Not on file   Social Needs    Financial resource strain: Not on file    Food insecurity:     Worry: Not on file     Inability: Not on file    Transportation needs:     Medical: Not on file     Non-medical: Not on file   Tobacco Use    Smoking status: Former Smoker    Smokeless tobacco: Never Used   Substance and Sexual Activity    Alcohol use: Yes    Drug use: No    Sexual activity: Yes     Partners: Male     Birth control/protection: Condom   Lifestyle    Physical activity:     Days per week: Not on file     Minutes per session: Not on file    Stress: Not on file   Relationships    Social connections:     Talks on phone: Not on file     Gets together: Not on file     Attends Christian service: Not on file     Active member of club or organization: Not on file     Attends meetings of clubs or organizations: Not on file     Relationship status: Not on file   Other Topics Concern    Are you pregnant or think you may be? No    Breast-feeding No   Social History Narrative    Lives with her  in [place] with their 2 children. She is a  at [employer]. Her oldest sister was molested as a child. Her sister has alcohol and psychological problems. Her  "father is . She helps to care for her mother. She is spiritual. She does [employer] and yoga. She drinks one cup of coffee a day.      Past Surgical History:   Procedure Laterality Date    BREAST SURGERY       SECTION      replacement of  breast implant       due to rupture     RHINOPLASTY       Family History   Adopted: Yes   Problem Relation Age of Onset    No Known Problems Daughter     ADD / ADHD Son     Strabismus Son            Review of Systems  General ROS: negative for chills, fever or weight loss  ENT ROS: negative for epistaxis, sore throat or rhinorrhea  Respiratory ROS: no cough, shortness of breath, or wheezing  Cardiovascular ROS: no chest pain or dyspnea on exertion  Gastrointestinal ROS: no abdominal pain, change in bowel habits, or black/ bloody stools    Physical Exam:  BP 94/76 (BP Location: Right arm, Patient Position: Sitting, BP Method: Large (Manual))   Pulse 87   Temp 98.7 °F (37.1 °C) (Oral)   Resp 16   Ht 5' 2" (1.575 m)   Wt 56.8 kg (125 lb 3.5 oz)   LMP 2019   SpO2 99%   BMI 22.90 kg/m²   General appearance: alert, cooperative, no distress  Constitutional:Oriented to person, place, and time.appears well-developed and well-nourished.  Lungs: clear to auscultation bilaterally, no dullness to percussion bilaterally  Heart: regular rate and rhythm without rub; no displacement of the PMI , S1&S2 present    Physical Exam      LABS:    Complete Blood Count  Lab Results   Component Value Date    RBC 4.91 2019    HGB 14.5 2019    HCT 44.1 2019    MCV 90 2019    MCH 29.5 2019    MCHC 32.9 2019    RDW 12.4 2019     2019    MPV 10.3 2019    GRAN 2.0 2019    GRAN 46.8 2019    LYMPH 1.7 2019    LYMPH 41.2 2019    MONO 0.4 2019    MONO 9.3 2019    EOS 0.1 2019    BASO 0.04 2019    EOSINOPHIL 1.7 2019    BASOPHIL 1.0 2019    " DIFFMETHOD Automated 05/16/2019       Comprehensive Metabolic Panel  Lab Results   Component Value Date     05/16/2019    BUN 24 (H) 05/16/2019    CREATININE 0.58 05/16/2019     05/16/2019    K 4.3 05/16/2019     05/16/2019    PROT 8.4 05/16/2019    ALBUMIN 4.9 05/16/2019    BILITOT 0.7 05/16/2019    AST 22 05/16/2019    ALKPHOS 47 05/16/2019    CO2 27 05/16/2019    ALT 14 05/16/2019    ANIONGAP 12 05/16/2019    EGFRNONAA >60.0 05/16/2019    ESTGFRAFRICA >60.0 05/16/2019       LIPID  Lab Results   Component Value Date    CHOL 204 (H) 05/16/2019    HDL 69 05/16/2019         TSH  Lab Results   Component Value Date    TSH 2.080 05/16/2019       Current Outpatient Medications   Medication Sig Dispense Refill    methylphenidate HCl (RITALIN) 20 MG tablet 1 tabs po in am and 1 tabs po in pm 60 tablet 0    tretinoin (RETIN-A) 0.025 % cream Apply thin film to face qhs then moisturize 45 g 6    valACYclovir (VALTREX) 1000 MG tablet Take 1 tablet (1,000 mg total) by mouth 2 (two) times daily. 60 tablet 6     No current facility-administered medications for this visit.        Assessment:    ICD-10-CM ICD-9-CM    1. Attention deficit disorder (ADD) in adult F98.8 314.00 methylphenidate HCl (RITALIN) 20 MG tablet         Plan:    Follow up in about 1 month (around 6/20/2019).          Linda Pineda MD

## 2019-06-20 ENCOUNTER — TELEPHONE (OUTPATIENT)
Dept: FAMILY MEDICINE | Facility: CLINIC | Age: 39
End: 2019-06-20

## 2019-06-20 ENCOUNTER — OFFICE VISIT (OUTPATIENT)
Dept: FAMILY MEDICINE | Facility: CLINIC | Age: 39
End: 2019-06-20
Payer: COMMERCIAL

## 2019-06-20 VITALS
TEMPERATURE: 99 F | SYSTOLIC BLOOD PRESSURE: 106 MMHG | OXYGEN SATURATION: 98 % | HEART RATE: 86 BPM | RESPIRATION RATE: 16 BRPM | HEIGHT: 62 IN | WEIGHT: 125.69 LBS | BODY MASS INDEX: 23.13 KG/M2 | DIASTOLIC BLOOD PRESSURE: 70 MMHG

## 2019-06-20 DIAGNOSIS — F98.8 ATTENTION DEFICIT DISORDER (ADD) IN ADULT: ICD-10-CM

## 2019-06-20 PROCEDURE — 3008F BODY MASS INDEX DOCD: CPT | Mod: CPTII,S$GLB,, | Performed by: FAMILY MEDICINE

## 2019-06-20 PROCEDURE — 99999 PR PBB SHADOW E&M-EST. PATIENT-LVL III: CPT | Mod: PBBFAC,,, | Performed by: FAMILY MEDICINE

## 2019-06-20 PROCEDURE — 99999 PR PBB SHADOW E&M-EST. PATIENT-LVL III: ICD-10-PCS | Mod: PBBFAC,,, | Performed by: FAMILY MEDICINE

## 2019-06-20 PROCEDURE — 3008F PR BODY MASS INDEX (BMI) DOCUMENTED: ICD-10-PCS | Mod: CPTII,S$GLB,, | Performed by: FAMILY MEDICINE

## 2019-06-20 PROCEDURE — 99214 OFFICE O/P EST MOD 30 MIN: CPT | Mod: S$GLB,,, | Performed by: FAMILY MEDICINE

## 2019-06-20 PROCEDURE — 99214 PR OFFICE/OUTPT VISIT, EST, LEVL IV, 30-39 MIN: ICD-10-PCS | Mod: S$GLB,,, | Performed by: FAMILY MEDICINE

## 2019-06-20 RX ORDER — METHYLPHENIDATE HYDROCHLORIDE 20 MG/1
TABLET ORAL
Qty: 60 TABLET | Refills: 0 | Status: SHIPPED | OUTPATIENT
Start: 2019-06-20 | End: 2019-07-18 | Stop reason: SDUPTHER

## 2019-06-20 RX ORDER — TERBINAFINE HYDROCHLORIDE 250 MG/1
TABLET ORAL
Refills: 0 | COMMUNITY
Start: 2019-05-21 | End: 2019-07-18 | Stop reason: ALTCHOICE

## 2019-06-20 NOTE — PROGRESS NOTES
HPI:  Karolyn Campbell is a 38 y.o. year old female that  presents for refill om her Ritalin.She has been doing well and having no side effects.  Chief Complaint   Patient presents with    ADD     refill on medication     Past Medical History:   Diagnosis Date    Abnormal Pap smear of cervix 2000    prior to being a pt     ADHD (attention deficit hyperactivity disorder)     Allergy     Anxiety     Anxiety     Fever blister     HPV (human papilloma virus) infection     Hyperlipemia     Migraine      Social History     Socioeconomic History    Marital status:      Spouse name: Not on file    Number of children: Not on file    Years of education: Not on file    Highest education level: Not on file   Occupational History    Not on file   Social Needs    Financial resource strain: Not on file    Food insecurity:     Worry: Not on file     Inability: Not on file    Transportation needs:     Medical: Not on file     Non-medical: Not on file   Tobacco Use    Smoking status: Former Smoker    Smokeless tobacco: Never Used   Substance and Sexual Activity    Alcohol use: Yes    Drug use: No    Sexual activity: Yes     Partners: Male     Birth control/protection: Condom   Lifestyle    Physical activity:     Days per week: Not on file     Minutes per session: Not on file    Stress: Not on file   Relationships    Social connections:     Talks on phone: Not on file     Gets together: Not on file     Attends Anabaptism service: Not on file     Active member of club or organization: Not on file     Attends meetings of clubs or organizations: Not on file     Relationship status: Not on file   Other Topics Concern    Are you pregnant or think you may be? No    Breast-feeding No   Social History Narrative    Lives with her  in [place] with their 2 children. She is a  at [employer]. Her oldest sister was molested as a child. Her sister has alcohol and psychological problems. Her father  "is . She helps to care for her mother. She is spiritual. She does [employer] and yoga. She drinks one cup of coffee a day.      Past Surgical History:   Procedure Laterality Date    BREAST SURGERY       SECTION      replacement of  breast implant       due to rupture     RHINOPLASTY       Family History   Adopted: Yes   Problem Relation Age of Onset    No Known Problems Daughter     ADD / ADHD Son     Strabismus Son            Review of Systems  General ROS: negative for chills, fever or weight loss  ENT ROS: negative for epistaxis, sore throat or rhinorrhea  Respiratory ROS: no cough, shortness of breath, or wheezing  Cardiovascular ROS: no chest pain or dyspnea on exertion  Gastrointestinal ROS: no abdominal pain, change in bowel habits, or black/ bloody stools    Physical Exam:  /70 (BP Location: Right arm, Patient Position: Sitting, BP Method: Large (Manual))   Pulse 86   Temp 98.9 °F (37.2 °C) (Oral)   Resp 16   Ht 5' 2" (1.575 m)   Wt 57 kg (125 lb 10.6 oz)   LMP 2019   SpO2 98%   BMI 22.98 kg/m²   General appearance: alert, cooperative, no distress  Constitutional:Oriented to person, place, and time.appears well-developed and well-nourished.  Lungs: clear to auscultation bilaterally, no dullness to percussion bilaterally  Heart: regular rate and rhythm without rub; no displacement of the PMI , S1&S2 present  Abdomen: soft, non-tender; bowel sounds normoactive; no organomegaly  Physical Exam      LABS:    Complete Blood Count  Lab Results   Component Value Date    RBC 4.91 2019    HGB 14.5 2019    HCT 44.1 2019    MCV 90 2019    MCH 29.5 2019    MCHC 32.9 2019    RDW 12.4 2019     2019    MPV 10.3 2019    GRAN 2.0 2019    GRAN 46.8 2019    LYMPH 1.7 2019    LYMPH 41.2 2019    MONO 0.4 2019    MONO 9.3 2019    EOS 0.1 2019    BASO 0.04 2019    " EOSINOPHIL 1.7 05/16/2019    BASOPHIL 1.0 05/16/2019    DIFFMETHOD Automated 05/16/2019       Comprehensive Metabolic Panel  Lab Results   Component Value Date     05/16/2019    BUN 24 (H) 05/16/2019    CREATININE 0.58 05/16/2019     05/16/2019    K 4.3 05/16/2019     05/16/2019    PROT 8.4 05/16/2019    ALBUMIN 4.9 05/16/2019    BILITOT 0.7 05/16/2019    AST 22 05/16/2019    ALKPHOS 47 05/16/2019    CO2 27 05/16/2019    ALT 14 05/16/2019    ANIONGAP 12 05/16/2019    EGFRNONAA >60.0 05/16/2019    ESTGFRAFRICA >60.0 05/16/2019       LIPID  Lab Results   Component Value Date    CHOL 204 (H) 05/16/2019    HDL 69 05/16/2019         TSH  Lab Results   Component Value Date    TSH 2.080 05/16/2019       Current Outpatient Medications   Medication Sig Dispense Refill    methylphenidate HCl (RITALIN) 20 MG tablet 1 tabs po in am and 1 tabs po in pm 60 tablet 0    terbinafine HCl (LAMISIL) 250 mg tablet TAKE 1 TAB ONCE A DAY FOR 90 DAYS  0    tretinoin (RETIN-A) 0.025 % cream Apply thin film to face qhs then moisturize 45 g 6    valACYclovir (VALTREX) 1000 MG tablet Take 1 tablet (1,000 mg total) by mouth 2 (two) times daily. 60 tablet 6     No current facility-administered medications for this visit.        Assessment:    ICD-10-CM ICD-9-CM    1. Attention deficit disorder (ADD) in adult F98.8 314.00 methylphenidate HCl (RITALIN) 20 MG tablet         Plan:    Follow up in about 1 month (around 7/20/2019).          Linda Pineda MD

## 2019-06-20 NOTE — TELEPHONE ENCOUNTER
----- Message from Fransisca Busch MA sent at 6/20/2019  9:22 AM CDT -----  Patient was in today with Dr. Pineda for her medication refill on   methylphenidate HCl (RITALIN) 20 MG tablet. Patient is in need of a follow up appointment and medication refill in one month. Please advise

## 2019-06-20 NOTE — TELEPHONE ENCOUNTER
Advise patient that I do NOT normally see patients for ADHD that has not had psychiatric evaluation by a psychiatrist BUT I will make an exception this time ONLY because I know she will not be able to get in to see psychiatrist before she is out of medication but she will need to STAY on MONTHLY visits and should her ADHD become uncontrolled or if she is having any other mental health problems such as anxiety or depression - I will have to refer her to psychiatrist for further management. If she is in agreement with this - then you can schedule as NEW PATIENT on whatever day she will be due for 1 month follow up

## 2019-07-01 ENCOUNTER — OFFICE VISIT (OUTPATIENT)
Dept: DERMATOLOGY | Facility: CLINIC | Age: 39
End: 2019-07-01
Payer: COMMERCIAL

## 2019-07-01 DIAGNOSIS — D18.00 ANGIOMA: ICD-10-CM

## 2019-07-01 DIAGNOSIS — D22.9 NEVUS: ICD-10-CM

## 2019-07-01 DIAGNOSIS — L70.0 ACNE VULGARIS: ICD-10-CM

## 2019-07-01 DIAGNOSIS — L81.4 LENTIGO: ICD-10-CM

## 2019-07-01 DIAGNOSIS — L82.1 SK (SEBORRHEIC KERATOSIS): ICD-10-CM

## 2019-07-01 DIAGNOSIS — L30.9 DERMATITIS: Primary | ICD-10-CM

## 2019-07-01 PROCEDURE — 99999 PR PBB SHADOW E&M-EST. PATIENT-LVL II: CPT | Mod: PBBFAC,,, | Performed by: DERMATOLOGY

## 2019-07-01 PROCEDURE — 99999 PR PBB SHADOW E&M-EST. PATIENT-LVL II: ICD-10-PCS | Mod: PBBFAC,,, | Performed by: DERMATOLOGY

## 2019-07-01 PROCEDURE — 99214 OFFICE O/P EST MOD 30 MIN: CPT | Mod: S$GLB,,, | Performed by: DERMATOLOGY

## 2019-07-01 PROCEDURE — 99214 PR OFFICE/OUTPT VISIT, EST, LEVL IV, 30-39 MIN: ICD-10-PCS | Mod: S$GLB,,, | Performed by: DERMATOLOGY

## 2019-07-01 RX ORDER — ALCLOMETASONE DIPROPIONATE 0.5 MG/G
CREAM TOPICAL
Qty: 30 G | Refills: 1 | Status: SHIPPED | OUTPATIENT
Start: 2019-07-01 | End: 2020-03-10

## 2019-07-01 NOTE — PROGRESS NOTES
"  Subjective:       Patient ID:  Karolyn Campbell is a 38 y.o. female who presents for   Chief Complaint   Patient presents with    Skin Check     Patient is here today for a "mole" check.   Pt has a history of  Normal sun exposure in the past.   Pt recalls several blistering sunburns in the past- yes  Pt has history of tanning bed use- yes  Pt has  had moles removed in the past- yes, benign per pt  Pt has history of melanoma in first degree relatives-  Adopted    Pt  Has scaly skin on left upper lip.  Present for months.  No tx. Not bleeding.   Pt ws tx for acne in the past.  Got a reaction from skin medica cleanser and did not tolerate retin a .025% cream either.  Her face had blisters and swelling under eyes.        Review of Systems   Skin: Positive for daily sunscreen use and activity-related sunscreen use. Negative for tendency to form keloidal scars and recent sunburn.   Hematologic/Lymphatic: Does not bruise/bleed easily.        Objective:    Physical Exam   Constitutional: She appears well-developed and well-nourished. No distress.   Neurological: She is alert and oriented to person, place, and time. She is not disoriented.   Psychiatric: She has a normal mood and affect.   Skin:   Areas Examined (abnormalities noted in diagram):   Scalp / Hair Palpated and Inspected  Head / Face Inspection Performed  Neck Inspection Performed  Chest / Axilla Inspection Performed  Abdomen Inspection Performed  Back Inspection Performed  RUE Inspected  LUE Inspection Performed  Nails and Digits Inspection Performed                       Diagram Legend     Erythematous scaling macule/papule c/w actinic keratosis       Vascular papule c/w angioma      Pigmented verrucoid papule/plaque c/w seborrheic keratosis      Yellow umbilicated papule c/w sebaceous hyperplasia      Irregularly shaped tan macule c/w lentigo     1-2 mm smooth white papules consistent with Milia      Movable subcutaneous cyst with punctum c/w epidermal " inclusion cyst      Subcutaneous movable cyst c/w pilar cyst      Firm pink to brown papule c/w dermatofibroma      Pedunculated fleshy papule(s) c/w skin tag(s)      Evenly pigmented macule c/w junctional nevus     Mildly variegated pigmented, slightly irregular-bordered macule c/w mildly atypical nevus      Flesh colored to evenly pigmented papule c/w intradermal nevus       Pink pearly papule/plaque c/w basal cell carcinoma      Erythematous hyperkeratotic cursted plaque c/w SCC      Surgical scar with no sign of skin cancer recurrence      Open and closed comedones      Inflammatory papules and pustules      Verrucoid papule consistent consistent with wart     Erythematous eczematous patches and plaques     Dystrophic onycholytic nail with subungual debris c/w onychomycosis     Umbilicated papule    Erythematous-base heme-crusted tan verrucoid plaque consistent with inflamed seborrheic keratosis     Erythematous Silvery Scaling Plaque c/w Psoriasis     See annotation      Assessment / Plan:        Dermatitis- upper lip   -     alclomethasone (ACLOVATE) 0.05 % cream; AAA qhs to lip for 1 week and prn redness or scaling or itching  Dispense: 30 g; Refill: 1    Lentigo  This is a benign hyperpigmented sun induced lesion. Daily sun protection will reduce the number of new lesions. Treatment of these benign lesions are considered cosmetic.  The nature of sun-induced photo-aging and skin cancers is discussed.  Sun avoidance, protective clothing, and the use of 30-SPF sunscreens is advised. Observe closely for skin damage/changes, and call if such occurs.    Angioma  These are benign vascular lesions that are inherited.  Treatment is not necessary.    Nevus  Discussed ABCDE's of nevi.  Monitor for new mole or moles that are becoming bigger, darker, irritated, or developing irregular borders. Brochure provided.  '  SK (seborrheic keratosis)  These are benign inherited growths without a malignant potential. Reassurance  given to patient. No treatment is necessary.     Acne vulgaris  otc retinol as pt intolerant of rx retin a       Total body skin examination performed today including at least 12 points as noted in physical examination. No lesions suspicious for malignancy noted.             Follow up in about 3 months (around 10/1/2019) for recheck left upper lip .

## 2019-07-18 ENCOUNTER — OFFICE VISIT (OUTPATIENT)
Dept: FAMILY MEDICINE | Facility: CLINIC | Age: 39
End: 2019-07-18
Payer: COMMERCIAL

## 2019-07-18 VITALS
SYSTOLIC BLOOD PRESSURE: 106 MMHG | TEMPERATURE: 98 F | HEIGHT: 62 IN | HEART RATE: 87 BPM | BODY MASS INDEX: 23.47 KG/M2 | DIASTOLIC BLOOD PRESSURE: 74 MMHG | OXYGEN SATURATION: 98 % | WEIGHT: 127.56 LBS

## 2019-07-18 DIAGNOSIS — F90.2 ATTENTION DEFICIT HYPERACTIVITY DISORDER (ADHD), COMBINED TYPE: ICD-10-CM

## 2019-07-18 PROBLEM — F90.9 ADHD (ATTENTION DEFICIT HYPERACTIVITY DISORDER): Status: ACTIVE | Noted: 2019-07-18

## 2019-07-18 PROCEDURE — 99213 PR OFFICE/OUTPT VISIT, EST, LEVL III, 20-29 MIN: ICD-10-PCS | Mod: S$GLB,,, | Performed by: NURSE PRACTITIONER

## 2019-07-18 PROCEDURE — 99999 PR PBB SHADOW E&M-EST. PATIENT-LVL III: ICD-10-PCS | Mod: PBBFAC,,, | Performed by: NURSE PRACTITIONER

## 2019-07-18 PROCEDURE — 3008F BODY MASS INDEX DOCD: CPT | Mod: CPTII,S$GLB,, | Performed by: NURSE PRACTITIONER

## 2019-07-18 PROCEDURE — 3008F PR BODY MASS INDEX (BMI) DOCUMENTED: ICD-10-PCS | Mod: CPTII,S$GLB,, | Performed by: NURSE PRACTITIONER

## 2019-07-18 PROCEDURE — 99999 PR PBB SHADOW E&M-EST. PATIENT-LVL III: CPT | Mod: PBBFAC,,, | Performed by: NURSE PRACTITIONER

## 2019-07-18 PROCEDURE — 99213 OFFICE O/P EST LOW 20 MIN: CPT | Mod: S$GLB,,, | Performed by: NURSE PRACTITIONER

## 2019-07-18 RX ORDER — METHYLPHENIDATE HYDROCHLORIDE 20 MG/1
TABLET ORAL
Qty: 60 TABLET | Refills: 0 | Status: SHIPPED | OUTPATIENT
Start: 2019-07-18 | End: 2019-08-22 | Stop reason: SDUPTHER
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
## 2019-08-22 ENCOUNTER — OFFICE VISIT (OUTPATIENT)
Dept: FAMILY MEDICINE | Facility: CLINIC | Age: 39
End: 2019-08-22
Payer: COMMERCIAL

## 2019-08-22 VITALS
OXYGEN SATURATION: 99 % | RESPIRATION RATE: 17 BRPM | HEIGHT: 62 IN | HEART RATE: 81 BPM | BODY MASS INDEX: 23.29 KG/M2 | SYSTOLIC BLOOD PRESSURE: 108 MMHG | DIASTOLIC BLOOD PRESSURE: 70 MMHG | WEIGHT: 126.56 LBS | TEMPERATURE: 99 F

## 2019-08-22 DIAGNOSIS — F90.2 ATTENTION DEFICIT HYPERACTIVITY DISORDER (ADHD), COMBINED TYPE: ICD-10-CM

## 2019-08-22 PROCEDURE — 99999 PR PBB SHADOW E&M-EST. PATIENT-LVL IV: CPT | Mod: PBBFAC,,, | Performed by: NURSE PRACTITIONER

## 2019-08-22 PROCEDURE — 3008F PR BODY MASS INDEX (BMI) DOCUMENTED: ICD-10-PCS | Mod: CPTII,S$GLB,, | Performed by: NURSE PRACTITIONER

## 2019-08-22 PROCEDURE — 99213 OFFICE O/P EST LOW 20 MIN: CPT | Mod: S$GLB,,, | Performed by: NURSE PRACTITIONER

## 2019-08-22 PROCEDURE — 99213 PR OFFICE/OUTPT VISIT, EST, LEVL III, 20-29 MIN: ICD-10-PCS | Mod: S$GLB,,, | Performed by: NURSE PRACTITIONER

## 2019-08-22 PROCEDURE — 99999 PR PBB SHADOW E&M-EST. PATIENT-LVL IV: ICD-10-PCS | Mod: PBBFAC,,, | Performed by: NURSE PRACTITIONER

## 2019-08-22 PROCEDURE — 3008F BODY MASS INDEX DOCD: CPT | Mod: CPTII,S$GLB,, | Performed by: NURSE PRACTITIONER

## 2019-08-22 RX ORDER — METHYLPHENIDATE HYDROCHLORIDE 20 MG/1
TABLET ORAL
Qty: 60 TABLET | Refills: 0 | Status: SHIPPED | OUTPATIENT
Start: 2019-09-04 | End: 2019-09-26 | Stop reason: SDUPTHER
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
## 2019-09-03 ENCOUNTER — APPOINTMENT (OUTPATIENT)
Dept: RADIOLOGY | Facility: OTHER | Age: 39
End: 2019-09-03
Attending: OBSTETRICS & GYNECOLOGY
Payer: COMMERCIAL

## 2019-09-03 VITALS — WEIGHT: 126 LBS | HEIGHT: 62 IN | BODY MASS INDEX: 23.19 KG/M2

## 2019-09-03 DIAGNOSIS — Z12.39 SCREENING FOR BREAST CANCER: ICD-10-CM

## 2019-09-03 PROCEDURE — 77063 MAMMO DIGITAL SCREENING BILAT WITH TOMOSYNTHESIS_CAD: ICD-10-PCS | Mod: 26,,, | Performed by: RADIOLOGY

## 2019-09-03 PROCEDURE — 77067 SCR MAMMO BI INCL CAD: CPT | Mod: 26,,, | Performed by: RADIOLOGY

## 2019-09-03 PROCEDURE — 77067 MAMMO DIGITAL SCREENING BILAT WITH TOMOSYNTHESIS_CAD: ICD-10-PCS | Mod: 26,,, | Performed by: RADIOLOGY

## 2019-09-03 PROCEDURE — 77063 BREAST TOMOSYNTHESIS BI: CPT | Mod: 26,,, | Performed by: RADIOLOGY

## 2019-09-03 PROCEDURE — 77067 SCR MAMMO BI INCL CAD: CPT | Mod: TC,PN

## 2019-09-04 NOTE — PROGRESS NOTES
Just wanted to let you know that I got your mammogram results back and the radiologist reading is perfectly normal. Let me know if you have any questions or concerns  Hope you have a great day!  Dr Lubin

## 2019-09-26 ENCOUNTER — OFFICE VISIT (OUTPATIENT)
Dept: FAMILY MEDICINE | Facility: CLINIC | Age: 39
End: 2019-09-26
Payer: COMMERCIAL

## 2019-09-26 VITALS
OXYGEN SATURATION: 98 % | RESPIRATION RATE: 18 BRPM | BODY MASS INDEX: 23.49 KG/M2 | DIASTOLIC BLOOD PRESSURE: 68 MMHG | SYSTOLIC BLOOD PRESSURE: 112 MMHG | TEMPERATURE: 98 F | WEIGHT: 127.63 LBS | HEIGHT: 62 IN | HEART RATE: 91 BPM

## 2019-09-26 DIAGNOSIS — F90.2 ATTENTION DEFICIT HYPERACTIVITY DISORDER (ADHD), COMBINED TYPE: ICD-10-CM

## 2019-09-26 PROCEDURE — 3008F PR BODY MASS INDEX (BMI) DOCUMENTED: ICD-10-PCS | Mod: CPTII,S$GLB,, | Performed by: NURSE PRACTITIONER

## 2019-09-26 PROCEDURE — 99999 PR PBB SHADOW E&M-EST. PATIENT-LVL IV: CPT | Mod: PBBFAC,,, | Performed by: NURSE PRACTITIONER

## 2019-09-26 PROCEDURE — 99213 OFFICE O/P EST LOW 20 MIN: CPT | Mod: S$GLB,,, | Performed by: NURSE PRACTITIONER

## 2019-09-26 PROCEDURE — 3008F BODY MASS INDEX DOCD: CPT | Mod: CPTII,S$GLB,, | Performed by: NURSE PRACTITIONER

## 2019-09-26 PROCEDURE — 99213 PR OFFICE/OUTPT VISIT, EST, LEVL III, 20-29 MIN: ICD-10-PCS | Mod: S$GLB,,, | Performed by: NURSE PRACTITIONER

## 2019-09-26 PROCEDURE — 99999 PR PBB SHADOW E&M-EST. PATIENT-LVL IV: ICD-10-PCS | Mod: PBBFAC,,, | Performed by: NURSE PRACTITIONER

## 2019-09-26 RX ORDER — METHYLPHENIDATE HYDROCHLORIDE 20 MG/1
TABLET ORAL
Qty: 60 TABLET | Refills: 0 | Status: SHIPPED | OUTPATIENT
Start: 2019-10-04 | End: 2019-11-05 | Stop reason: SDUPTHER

## 2019-09-26 NOTE — PROGRESS NOTES
Subjective:       Patient ID: Karolyn Campbell is a 39 y.o. female.    Chief Complaint: Med check (Ritalin )    Patient is a 38-year-old white female with ADHD, history of HPV, recurrent cold sores, history of anxiety, and chronic allergies that is here today for ADHD medication follow up. I took over ADHD care in July 2019.     Patient reports she was 1st evaluated for ADHD by psychologist Carole in the 10th grade.  She reports she was diagnosed with ADHD predominantly inattentive and compulsive traits.  She reports taking Ritalin from the 10th to 12th grade and getting off her medicine in the 12th grade after she completed high school. We do not have access to those records of ADHD diagnoses.  She reports that she did not start back on medicine until she was 1st seen by Dr. Linda Pineda on 05/28/2018.  She was started on Adderall XR 10 mg daily.  The Adderall was not well tolerated so patient was changed to Ritalin 10 mg twice daily on 07/13/2018.  She was then increase to Ritalin 20 mg in the morning and 1 in the evening and in July 2018 but felt the 20 mg was too strong so in August 2018 she was put on 15 mg in the morning and 15 mg in the evening.  Patient stayed on Ritalin 15 mg twice daily from August 2018 up until February 27, 2019 when her dose was increased to 20 mg twice daily.  Since February 2019, patient has been on the same dose of 20 mg twice daily and reports she is able to focus and complete tasks without side effects.  She denies any increase in anxiety and no problems with sleep.  Her vital signs are stable on medication.  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      Current Outpatient Medications   Medication Sig Dispense Refill    alclomethasone (ACLOVATE) 0.05 % cream AAA qhs to lip for 1 week and prn redness or scaling or itching 30 g 1    [START ON 10/4/2019] methylphenidate HCl (RITALIN) 20 MG tablet Take 1 tablet by mouth in the morning and 1 tablet in  the evening 60 tablet 0    valACYclovir (VALTREX) 1000 MG tablet Take 1 tablet (1,000 mg total) by mouth 2 (two) times daily. 60 tablet 6     No current facility-administered medications for this visit.        Past Medical History:   Diagnosis Date    Abnormal Pap smear of cervix     prior to being a pt     ADHD (attention deficit hyperactivity disorder)     Diagnosed by psychologist in [place] in the 10th grade and was on Ritalin from 10th to 12 grade and got off med; she then did not start back on ADHD medication until 2018 with Dr. Linda Pineda here in office    Allergy     Anxiety     Anxiety     Fever blister     HPV (human papilloma virus) infection        Past Surgical History:   Procedure Laterality Date    AUGMENTATION OF BREAST      BREAST SURGERY      breast augmentation in ; and then in  had an implant deflate that needed repair     SECTION      replacement of  breast implant       due to rupture     RHINOPLASTY         Family History   Adopted: Yes   Problem Relation Age of Onset    No Known Problems Daughter     ADD / ADHD Son     Strabismus Son        Social History     Socioeconomic History    Marital status:      Spouse name: Not on file    Number of children: Not on file    Years of education: Not on file    Highest education level: Not on file   Occupational History    Occupation: works curriculum advisor   Social Needs    Financial resource strain: Not on file    Food insecurity:     Worry: Not on file     Inability: Not on file    Transportation needs:     Medical: Not on file     Non-medical: Not on file   Tobacco Use    Smoking status: Former Smoker     Packs/day: 0.25     Years: 10.00     Pack years: 2.50     Types: Cigarettes    Smokeless tobacco: Never Used   Substance and Sexual Activity    Alcohol use: Yes     Comment: once a month - 1 to 2 drinks per occasion    Drug use: No    Sexual activity: Yes      Partners: Male     Birth control/protection: Condom   Lifestyle    Physical activity:     Days per week: Not on file     Minutes per session: Not on file    Stress: Not on file   Relationships    Social connections:     Talks on phone: Not on file     Gets together: Not on file     Attends Jew service: Not on file     Active member of club or organization: Not on file     Attends meetings of clubs or organizations: Not on file     Relationship status: Not on file   Other Topics Concern    Are you pregnant or think you may be? No    Breast-feeding No   Social History Narrative    Lives with her  in [place] with their 2 children. Her oldest sister was molested as a child. Her sister has alcohol and psychological problems. Her father is . She is spiritual. She does Elizabeth and yoga.        Review of Systems   Constitutional: Negative for appetite change, chills, fatigue, fever and unexpected weight change.   HENT: Negative for congestion, ear pain, mouth sores, nosebleeds, postnasal drip, rhinorrhea, sinus pressure, sneezing, sore throat, trouble swallowing and voice change.    Eyes: Negative for photophobia, pain, discharge, redness, itching and visual disturbance.   Respiratory: Negative for cough, chest tightness and shortness of breath.    Cardiovascular: Negative for chest pain, palpitations and leg swelling.   Gastrointestinal: Negative for abdominal pain, blood in stool, constipation, diarrhea, nausea and vomiting.   Genitourinary: Negative for dysuria, frequency, hematuria and urgency.   Musculoskeletal: Negative for arthralgias, back pain, joint swelling and myalgias.   Skin: Negative for color change and rash.   Allergic/Immunologic: Negative for immunocompromised state.   Neurological: Negative for dizziness, seizures, syncope, weakness and headaches.   Hematological: Negative for adenopathy. Does not bruise/bleed easily.   Psychiatric/Behavioral: Negative for agitation, dysphoric  "mood, sleep disturbance and suicidal ideas. The patient is not nervous/anxious.          Objective:     Vitals:    09/26/19 1703   BP: 112/68   BP Location: Left arm   Patient Position: Sitting   BP Method: Small (Manual)   Pulse: 91   Resp: 18   Temp: 98.3 °F (36.8 °C)   TempSrc: Oral   SpO2: 98%   Weight: 57.9 kg (127 lb 10.3 oz)   Height: 5' 2" (1.575 m)          Physical Exam   Constitutional: She is oriented to person, place, and time. She appears well-developed and well-nourished.   HENT:   Head: Normocephalic and atraumatic.   Right Ear: External ear normal.   Left Ear: External ear normal.   Nose: Nose normal.   Mouth/Throat: Oropharynx is clear and moist. No oropharyngeal exudate.   Eyes: Pupils are equal, round, and reactive to light. EOM are normal.   Neck: Normal range of motion. Neck supple. No tracheal deviation present. No thyromegaly present.   Cardiovascular: Normal rate, regular rhythm and normal heart sounds.   No murmur heard.  Pulmonary/Chest: Effort normal and breath sounds normal. No respiratory distress.   Abdominal: Soft. She exhibits no distension.   Musculoskeletal: Normal range of motion. She exhibits no edema.   Lymphadenopathy:     She has no cervical adenopathy.   Neurological: She is alert and oriented to person, place, and time. No cranial nerve deficit. Coordination normal.   Skin: Skin is warm and dry. No rash noted.   Psychiatric: She has a normal mood and affect.         Assessment:         ICD-10-CM ICD-9-CM   1. Attention deficit hyperactivity disorder (ADHD), combined type F90.2 314.01       Plan:       Attention deficit hyperactivity disorder (ADHD), combined type   - will continue to treat her ADHD following her monthly since she had not had an adult ADHD evaluation by psychiatrist (only diagnosed by PCP, Dr. Pineda) (patient does reports having adolescent eval by psychologist in 10th grade but do not have access to records)..  Advised patient that if she remains stable " with no other problems on same dose for 1 year around March 2020, then I will be willing to move her to a 3 month follow ups.  Should the patient become unstable on current medication and dose or have other psychiatric issues such as anxiety or depression, then I will refer to Psychiatry for further management.  -  post dated for 10/04/2019 based on last prescription filled.  Follow-up in 5 weeks when next refill will be due.  -     methylphenidate HCl (RITALIN) 20 MG tablet; Take 1 tablet by mouth in the morning and 1 tablet in the evening  Dispense: 60 tablet; Refill: 0      Follow up in about 5 weeks (around 11/1/2019) for med check.     Patient's Medications   New Prescriptions    No medications on file   Previous Medications    ALCLOMETHASONE (ACLOVATE) 0.05 % CREAM    AAA qhs to lip for 1 week and prn redness or scaling or itching    VALACYCLOVIR (VALTREX) 1000 MG TABLET    Take 1 tablet (1,000 mg total) by mouth 2 (two) times daily.   Modified Medications    Modified Medication Previous Medication    METHYLPHENIDATE HCL (RITALIN) 20 MG TABLET methylphenidate HCl (RITALIN) 20 MG tablet       Take 1 tablet by mouth in the morning and 1 tablet in the evening    Take 1 tablet by mouth in the morning and 1 tablet in the evening   Discontinued Medications    No medications on file

## 2019-11-05 ENCOUNTER — OFFICE VISIT (OUTPATIENT)
Dept: FAMILY MEDICINE | Facility: CLINIC | Age: 39
End: 2019-11-05
Payer: COMMERCIAL

## 2019-11-05 VITALS
HEART RATE: 92 BPM | TEMPERATURE: 98 F | OXYGEN SATURATION: 99 % | HEIGHT: 62 IN | BODY MASS INDEX: 23.55 KG/M2 | SYSTOLIC BLOOD PRESSURE: 122 MMHG | DIASTOLIC BLOOD PRESSURE: 78 MMHG | WEIGHT: 128 LBS

## 2019-11-05 DIAGNOSIS — F90.2 ATTENTION DEFICIT HYPERACTIVITY DISORDER (ADHD), COMBINED TYPE: ICD-10-CM

## 2019-11-05 PROCEDURE — 99999 PR PBB SHADOW E&M-EST. PATIENT-LVL IV: ICD-10-PCS | Mod: PBBFAC,,, | Performed by: NURSE PRACTITIONER

## 2019-11-05 PROCEDURE — 99213 OFFICE O/P EST LOW 20 MIN: CPT | Mod: S$GLB,,, | Performed by: NURSE PRACTITIONER

## 2019-11-05 PROCEDURE — 3008F PR BODY MASS INDEX (BMI) DOCUMENTED: ICD-10-PCS | Mod: CPTII,S$GLB,, | Performed by: NURSE PRACTITIONER

## 2019-11-05 PROCEDURE — 99213 PR OFFICE/OUTPT VISIT, EST, LEVL III, 20-29 MIN: ICD-10-PCS | Mod: S$GLB,,, | Performed by: NURSE PRACTITIONER

## 2019-11-05 PROCEDURE — 3008F BODY MASS INDEX DOCD: CPT | Mod: CPTII,S$GLB,, | Performed by: NURSE PRACTITIONER

## 2019-11-05 PROCEDURE — 99999 PR PBB SHADOW E&M-EST. PATIENT-LVL IV: CPT | Mod: PBBFAC,,, | Performed by: NURSE PRACTITIONER

## 2019-11-05 RX ORDER — METHYLPHENIDATE HYDROCHLORIDE 20 MG/1
TABLET ORAL
Qty: 60 TABLET | Refills: 0 | Status: SHIPPED | OUTPATIENT
Start: 2019-11-05 | End: 2019-12-06 | Stop reason: SDUPTHER

## 2019-11-05 NOTE — PROGRESS NOTES
"Subjective:       Patient ID: Karolyn Campbell is a 39 y.o. female.    Chief Complaint: Medication Refill    40 y/o female presents to clinic for ADHD follow up.    Patient has ADHD controlled on current medication. No reported side effects. Vital signs stable. Blood pressure 122/78, pulse 92, temperature 97.6 °F (36.4 °C), temperature source Oral, height 5' 2" (1.575 m), weight 58 kg (127 lb 15.6 oz), SpO2 99 %. 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      Current Outpatient Medications   Medication Sig Dispense Refill    methylphenidate HCl (RITALIN) 20 MG tablet Take 1 tablet by mouth in the morning and 1 tablet in the evening 60 tablet 0    valACYclovir (VALTREX) 1000 MG tablet Take 1 tablet (1,000 mg total) by mouth 2 (two) times daily. 60 tablet 6    alclomethasone (ACLOVATE) 0.05 % cream AAA qhs to lip for 1 week and prn redness or scaling or itching 30 g 1     No current facility-administered medications for this visit.        Past Medical History:   Diagnosis Date    Abnormal Pap smear of cervix     prior to being a pt     ADHD (attention deficit hyperactivity disorder)     Diagnosed by psychologist in [place] in the 10th grade and was on Ritalin from 10th to 12 grade and got off med; she then did not start back on ADHD medication until 2018 with Dr. Linda Pineda here in office    Allergy     Anxiety     Anxiety     Fever blister     HPV (human papilloma virus) infection        Past Surgical History:   Procedure Laterality Date    AUGMENTATION OF BREAST      BREAST SURGERY      breast augmentation in ; and then in  had an implant deflate that needed repair     SECTION      replacement of  breast implant       due to rupture     RHINOPLASTY         Family History   Adopted: Yes   Problem Relation Age of Onset    No Known Problems Daughter     ADD / ADHD Son     Strabismus Son        Social History     Socioeconomic " History    Marital status:      Spouse name: Not on file    Number of children: Not on file    Years of education: Not on file    Highest education level: Not on file   Occupational History    Occupation: works curriculum advisor   Social Needs    Financial resource strain: Not on file    Food insecurity:     Worry: Not on file     Inability: Not on file    Transportation needs:     Medical: Not on file     Non-medical: Not on file   Tobacco Use    Smoking status: Former Smoker     Packs/day: 0.25     Years: 10.00     Pack years: 2.50     Types: Cigarettes    Smokeless tobacco: Never Used   Substance and Sexual Activity    Alcohol use: Yes     Comment: once a month - 1 to 2 drinks per occasion    Drug use: No    Sexual activity: Yes     Partners: Male     Birth control/protection: Condom   Lifestyle    Physical activity:     Days per week: Not on file     Minutes per session: Not on file    Stress: Not on file   Relationships    Social connections:     Talks on phone: Not on file     Gets together: Not on file     Attends Restorationism service: Not on file     Active member of club or organization: Not on file     Attends meetings of clubs or organizations: Not on file     Relationship status: Not on file   Other Topics Concern    Are you pregnant or think you may be? No    Breast-feeding No   Social History Narrative    Lives with her  in [place] with their 2 children. Her oldest sister was molested as a child. Her sister has alcohol and psychological problems. Her father is . She is spiritual. She does [employer] and yoga.        Review of Systems   Constitutional: Negative for fatigue and unexpected weight change.   HENT: Negative for tinnitus.    Eyes: Negative for visual disturbance.   Respiratory: Negative for shortness of breath.    Cardiovascular: Negative for chest pain and palpitations.   Gastrointestinal: Negative for abdominal pain.   Musculoskeletal: Negative for back pain.  "  Neurological: Negative for dizziness, weakness and headaches.   Psychiatric/Behavioral: Negative for decreased concentration and dysphoric mood.         Objective:     Vitals:    11/05/19 1512   BP: 122/78   Pulse: 92   Temp: 97.6 °F (36.4 °C)   TempSrc: Oral   SpO2: 99%   Weight: 58 kg (127 lb 15.6 oz)   Height: 5' 2" (1.575 m)          Physical Exam   Constitutional: She is oriented to person, place, and time. She appears well-developed and well-nourished. No distress.   HENT:   Head: Normocephalic.   Eyes: Pupils are equal, round, and reactive to light. Conjunctivae are normal.   Neck: Normal range of motion. Neck supple.   Cardiovascular: Normal rate and regular rhythm.   Pulmonary/Chest: Effort normal and breath sounds normal.   Musculoskeletal: Normal range of motion.   Neurological: She is alert and oriented to person, place, and time.   Skin: Skin is warm and dry.   Psychiatric: She has a normal mood and affect. Her behavior is normal.         Assessment:         ICD-10-CM ICD-9-CM   1. Attention deficit hyperactivity disorder (ADHD), combined type F90.2 314.01       Plan:       Attention deficit hyperactivity disorder (ADHD), combined type  -     Continue current medication. Follow up in 1 month for medication management  -     methylphenidate HCl (RITALIN) 20 MG tablet; Take 1 tablet by mouth in the morning and 1 tablet in the evening  Dispense: 60 tablet; Refill: 0      Follow up in about 4 weeks (around 12/3/2019).     Patient's Medications   New Prescriptions    No medications on file   Previous Medications    ALCLOMETHASONE (ACLOVATE) 0.05 % CREAM    AAA qhs to lip for 1 week and prn redness or scaling or itching    VALACYCLOVIR (VALTREX) 1000 MG TABLET    Take 1 tablet (1,000 mg total) by mouth 2 (two) times daily.   Modified Medications    Modified Medication Previous Medication    METHYLPHENIDATE HCL (RITALIN) 20 MG TABLET methylphenidate HCl (RITALIN) 20 MG tablet       Take 1 tablet by mouth in " the morning and 1 tablet in the evening    Take 1 tablet by mouth in the morning and 1 tablet in the evening   Discontinued Medications    No medications on file

## 2019-12-02 ENCOUNTER — TELEPHONE (OUTPATIENT)
Dept: FAMILY MEDICINE | Facility: CLINIC | Age: 39
End: 2019-12-02

## 2019-12-02 NOTE — TELEPHONE ENCOUNTER
Called and spoke with pt in regards of needing an appt for a 1 month med refill. Informed pt POLLY Cruz is out of the office until January. Pt made an appt on 12/06/2019 with NP Bertram for her 1 month med refill.

## 2019-12-02 NOTE — TELEPHONE ENCOUNTER
----- Message from Vee Carballo sent at 12/2/2019 10:30 AM CST -----  Contact: Self [phone]  Patient Returning Your Phone Call

## 2019-12-06 ENCOUNTER — OFFICE VISIT (OUTPATIENT)
Dept: FAMILY MEDICINE | Facility: CLINIC | Age: 39
End: 2019-12-06
Payer: COMMERCIAL

## 2019-12-06 VITALS
HEIGHT: 62 IN | WEIGHT: 126.56 LBS | OXYGEN SATURATION: 98 % | TEMPERATURE: 99 F | BODY MASS INDEX: 23.29 KG/M2 | RESPIRATION RATE: 16 BRPM | HEART RATE: 118 BPM | SYSTOLIC BLOOD PRESSURE: 124 MMHG | DIASTOLIC BLOOD PRESSURE: 70 MMHG

## 2019-12-06 DIAGNOSIS — R05.9 COUGH: ICD-10-CM

## 2019-12-06 DIAGNOSIS — F90.2 ATTENTION DEFICIT HYPERACTIVITY DISORDER (ADHD), COMBINED TYPE: Primary | ICD-10-CM

## 2019-12-06 PROCEDURE — 99213 PR OFFICE/OUTPT VISIT, EST, LEVL III, 20-29 MIN: ICD-10-PCS | Mod: S$GLB,,, | Performed by: NURSE PRACTITIONER

## 2019-12-06 PROCEDURE — 3008F PR BODY MASS INDEX (BMI) DOCUMENTED: ICD-10-PCS | Mod: CPTII,S$GLB,, | Performed by: NURSE PRACTITIONER

## 2019-12-06 PROCEDURE — 3008F BODY MASS INDEX DOCD: CPT | Mod: CPTII,S$GLB,, | Performed by: NURSE PRACTITIONER

## 2019-12-06 PROCEDURE — 99213 OFFICE O/P EST LOW 20 MIN: CPT | Mod: S$GLB,,, | Performed by: NURSE PRACTITIONER

## 2019-12-06 PROCEDURE — 99999 PR PBB SHADOW E&M-EST. PATIENT-LVL IV: ICD-10-PCS | Mod: PBBFAC,,, | Performed by: NURSE PRACTITIONER

## 2019-12-06 PROCEDURE — 99999 PR PBB SHADOW E&M-EST. PATIENT-LVL IV: CPT | Mod: PBBFAC,,, | Performed by: NURSE PRACTITIONER

## 2019-12-06 RX ORDER — METHYLPHENIDATE HYDROCHLORIDE 20 MG/1
TABLET ORAL
Qty: 60 TABLET | Refills: 0 | Status: SHIPPED | OUTPATIENT
Start: 2019-12-06 | End: 2019-12-06 | Stop reason: SDUPTHER

## 2019-12-06 RX ORDER — METHYLPHENIDATE HYDROCHLORIDE 20 MG/1
TABLET ORAL
Qty: 60 TABLET | Refills: 0 | Status: SHIPPED | OUTPATIENT
Start: 2019-12-06 | End: 2020-01-08 | Stop reason: SDUPTHER

## 2019-12-06 NOTE — PATIENT INSTRUCTIONS
"  Viral Syndrome (Adult)  A viral illness may cause a number of symptoms. The symptoms depend on the part of the body that the virus affects. If it settles in your nose, throat, and lungs, it may cause cough, sore throat, congestion, and sometimes headache. If it settles in your stomach and intestinal tract, it may cause vomiting and diarrhea. Sometimes it causes vague symptoms like "aching all over," feeling tired, loss of appetite, or fever.  A viral illness usually lasts 1 to 2 weeks, but sometimes it lasts longer. In some cases, a more serious infection can look like a viral syndrome in the first few days of the illness. You may need another exam and additional tests to know the difference. Watch for the warning signs listed below.  Home care  Follow these guidelines for taking care of yourself at home:  · If symptoms are severe, rest at home for the first 2 to 3 days.  · Stay away from cigarette smoke - both your smoke and the smoke from others.  · You may use over-the-counter acetaminophen or ibuprofen for fever, muscle aching, and headache, unless another medicine was prescribed for this. If you have chronic liver or kidney disease or ever had a stomach ulcer or GI bleeding, talk with your doctor before using these medicines. No one who is younger than 18 and ill with a fever should take aspirin. It may cause severe disease or death.  · Your appetite may be poor, so a light diet is fine. Avoid dehydration by drinking 8 to 12 8-ounce glasses of fluids each day. This may include water; orange juice; lemonade; apple, grape, and cranberry juice; clear fruit drinks; electrolyte replacement and sports drinks; and decaffeinated teas and coffee. If you have been diagnosed with a kidney disease, ask your doctor how much and what types of fluids you should drink to prevent dehydration. If you have kidney disease, drinking too much fluid can cause it build up in the your body and be dangerous to your " health.  · Over-the-counter remedies won't shorten the length of the illness but may be helpful for cough, sore throat; and nasal and sinus congestion. Don't use decongestants if you have high blood pressure.  Follow-up care  Follow up with your healthcare provider if you do not improve over the next week.  Call 911  Get emergency medical care if any of the following occur:  · Convulsion  · Feeling weak, dizzy, or like you are going to faint  · Chest pain, shortness of breath, wheezing, or difficulty breathing  When to seek medical advice  Call your healthcare provider right away if any of these occur:  · Cough with lots of colored sputum (mucus) or blood in your sputum  · Chest pain, shortness of breath, wheezing, or difficulty breathing  · Severe headache; face, neck, or ear pain  · Severe, constant pain in the lower right side of your belly (abdominal)  · Continued vomiting (cant keep liquids down)  · Frequent diarrhea (more than 5 times a day); blood (red or black color) or mucus in diarrhea  · Feeling weak, dizzy, or like you are going to faint  · Extreme thirst  · Fever of 100.4°F (38°C) or higher, or as directed by your healthcare provider  Date Last Reviewed: 9/25/2015  © 6594-0954 [employer]. [address] [place] [address], [place] [address]. All rights reserved. This information is not intended as a substitute for professional medical care. Always follow your healthcare professional's instructions.
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
## 2020-01-08 ENCOUNTER — OFFICE VISIT (OUTPATIENT)
Dept: FAMILY MEDICINE | Facility: CLINIC | Age: 40
End: 2020-01-08
Payer: COMMERCIAL

## 2020-01-08 VITALS
DIASTOLIC BLOOD PRESSURE: 74 MMHG | HEART RATE: 106 BPM | RESPIRATION RATE: 16 BRPM | TEMPERATURE: 98 F | WEIGHT: 125.88 LBS | HEIGHT: 62 IN | BODY MASS INDEX: 23.17 KG/M2 | SYSTOLIC BLOOD PRESSURE: 120 MMHG | OXYGEN SATURATION: 98 %

## 2020-01-08 DIAGNOSIS — B00.1 RECURRENT COLD SORES: ICD-10-CM

## 2020-01-08 DIAGNOSIS — F90.2 ATTENTION DEFICIT HYPERACTIVITY DISORDER (ADHD), COMBINED TYPE: ICD-10-CM

## 2020-01-08 PROCEDURE — 3008F PR BODY MASS INDEX (BMI) DOCUMENTED: ICD-10-PCS | Mod: CPTII,S$GLB,, | Performed by: NURSE PRACTITIONER

## 2020-01-08 PROCEDURE — 99213 OFFICE O/P EST LOW 20 MIN: CPT | Mod: S$GLB,,, | Performed by: NURSE PRACTITIONER

## 2020-01-08 PROCEDURE — 99213 PR OFFICE/OUTPT VISIT, EST, LEVL III, 20-29 MIN: ICD-10-PCS | Mod: S$GLB,,, | Performed by: NURSE PRACTITIONER

## 2020-01-08 PROCEDURE — 99999 PR PBB SHADOW E&M-EST. PATIENT-LVL IV: CPT | Mod: PBBFAC,,, | Performed by: NURSE PRACTITIONER

## 2020-01-08 PROCEDURE — 3008F BODY MASS INDEX DOCD: CPT | Mod: CPTII,S$GLB,, | Performed by: NURSE PRACTITIONER

## 2020-01-08 PROCEDURE — 99999 PR PBB SHADOW E&M-EST. PATIENT-LVL IV: ICD-10-PCS | Mod: PBBFAC,,, | Performed by: NURSE PRACTITIONER

## 2020-01-08 RX ORDER — METHYLPHENIDATE HYDROCHLORIDE 20 MG/1
TABLET ORAL
Qty: 60 TABLET | Refills: 0 | Status: SHIPPED | OUTPATIENT
Start: 2020-01-08 | End: 2020-02-06 | Stop reason: SDUPTHER

## 2020-01-08 RX ORDER — VALACYCLOVIR HYDROCHLORIDE 1 G/1
TABLET, FILM COATED ORAL
Qty: 30 TABLET | Refills: 3 | Status: SHIPPED | OUTPATIENT
Start: 2020-01-08 | End: 2021-01-31 | Stop reason: SDUPTHER
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
## 2020-02-06 ENCOUNTER — OFFICE VISIT (OUTPATIENT)
Dept: FAMILY MEDICINE | Facility: CLINIC | Age: 40
End: 2020-02-06
Payer: COMMERCIAL

## 2020-02-06 VITALS
WEIGHT: 128.31 LBS | OXYGEN SATURATION: 93 % | TEMPERATURE: 99 F | SYSTOLIC BLOOD PRESSURE: 114 MMHG | DIASTOLIC BLOOD PRESSURE: 62 MMHG | HEART RATE: 80 BPM | HEIGHT: 62 IN | RESPIRATION RATE: 16 BRPM | BODY MASS INDEX: 23.61 KG/M2

## 2020-02-06 DIAGNOSIS — F90.2 ATTENTION DEFICIT HYPERACTIVITY DISORDER (ADHD), COMBINED TYPE: Primary | ICD-10-CM

## 2020-02-06 DIAGNOSIS — B00.1 RECURRENT COLD SORES: ICD-10-CM

## 2020-02-06 PROCEDURE — 3008F PR BODY MASS INDEX (BMI) DOCUMENTED: ICD-10-PCS | Mod: CPTII,S$GLB,, | Performed by: NURSE PRACTITIONER

## 2020-02-06 PROCEDURE — 99999 PR PBB SHADOW E&M-EST. PATIENT-LVL IV: CPT | Mod: PBBFAC,,, | Performed by: NURSE PRACTITIONER

## 2020-02-06 PROCEDURE — 3008F BODY MASS INDEX DOCD: CPT | Mod: CPTII,S$GLB,, | Performed by: NURSE PRACTITIONER

## 2020-02-06 PROCEDURE — 99214 OFFICE O/P EST MOD 30 MIN: CPT | Mod: S$GLB,,, | Performed by: NURSE PRACTITIONER

## 2020-02-06 PROCEDURE — 99214 PR OFFICE/OUTPT VISIT, EST, LEVL IV, 30-39 MIN: ICD-10-PCS | Mod: S$GLB,,, | Performed by: NURSE PRACTITIONER

## 2020-02-06 PROCEDURE — 99999 PR PBB SHADOW E&M-EST. PATIENT-LVL IV: ICD-10-PCS | Mod: PBBFAC,,, | Performed by: NURSE PRACTITIONER

## 2020-02-06 RX ORDER — METHYLPHENIDATE HYDROCHLORIDE 20 MG/1
TABLET ORAL
Qty: 60 TABLET | Refills: 0 | Status: SHIPPED | OUTPATIENT
Start: 2020-02-06 | End: 2020-03-10 | Stop reason: SDUPTHER

## 2020-02-06 NOTE — PROGRESS NOTES
Subjective:       Patient ID: Karolyn Campbell is a 39 y.o. female.    Chief Complaint: Medication Refill    Patient is a 39-year-old white female with ADHD, history of HPV, recurrent cold sores, history of anxiety, and chronic allergies that is here today for ADHD medication follow up. I took over ADHD care in July 2019.     Patient reports she was 1st evaluated for ADHD by psychologist in [place] in the 10th grade.  She reports she was diagnosed with ADHD predominantly inattentive and compulsive traits.  She reports taking Ritalin from the 10th to 12th grade and getting off her medicine in the 12th grade after she completed high school. We do not have access to those records of ADHD diagnoses.  She reports that she did not start back on medicine until she was 1st seen by Dr. Linda Pineda on 05/28/2018.  She was started on Adderall XR 10 mg daily.  The Adderall was not well tolerated so patient was changed to Ritalin 10 mg twice daily on 07/13/2018.  She was then increase to Ritalin 20 mg in the morning and 1 in the evening and in July 2018 but felt the 20 mg was too strong so in August 2018 she was put on 15 mg in the morning and 15 mg in the evening.  Patient stayed on Ritalin 15 mg twice daily from August 2018 up until February 27, 2019 when her dose was increased to 20 mg twice daily.  Since February 2019, patient has been on the same dose of 20 mg twice daily and reports she is able to focus and complete tasks without side effects.  She denies any increase in anxiety and no problems with sleep.  Her vital signs are stable on medication.  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     Patient has recurrent cold sores and needs refill on Valtrex.     Patient will be due for fasting labs and WELLNESS EXAM around June 2020.      Current Outpatient Medications   Medication Sig Dispense Refill    methylphenidate HCl (RITALIN) 20 MG tablet Take 1 tablet by mouth in the morning and 1  tablet in the evening 60 tablet 0    valACYclovir (VALTREX) 1000 MG tablet Take 2 tablets at onset of cold sore and repeat in 12 hours for 1 additional dose as need for cold sore outbreak 30 tablet 3    alclomethasone (ACLOVATE) 0.05 % cream AAA qhs to lip for 1 week and prn redness or scaling or itching (Patient not taking: Reported on 2020) 30 g 1     No current facility-administered medications for this visit.        Past Medical History:   Diagnosis Date    Abnormal Pap smear of cervix     prior to being a pt     ADHD (attention deficit hyperactivity disorder)     Diagnosed by psychologist in [place] in the 10th grade and was on Ritalin from 10th to 12 grade and got off med; she then did not start back on ADHD medication until 2018 with Dr. iLnda Pineda here in office    Allergy     Anxiety     Anxiety     Fever blister     HPV (human papilloma virus) infection        Past Surgical History:   Procedure Laterality Date    AUGMENTATION OF BREAST      BREAST SURGERY      breast augmentation in ; and then in  had an implant deflate that needed repair     SECTION      replacement of  breast implant       due to rupture     RHINOPLASTY         Family History   Adopted: Yes   Problem Relation Age of Onset    No Known Problems Daughter     ADD / ADHD Son     Strabismus Son        Social History     Socioeconomic History    Marital status:      Spouse name: Not on file    Number of children: Not on file    Years of education: Not on file    Highest education level: Not on file   Occupational History    Occupation: works curriculum advisor   Social Needs    Financial resource strain: Not on file    Food insecurity:     Worry: Not on file     Inability: Not on file    Transportation needs:     Medical: Not on file     Non-medical: Not on file   Tobacco Use    Smoking status: Former Smoker     Packs/day: 0.25     Years: 10.00     Pack  years: 2.50     Types: Cigarettes    Smokeless tobacco: Never Used   Substance and Sexual Activity    Alcohol use: Yes     Comment: once a month - 1 to 2 drinks per occasion    Drug use: No    Sexual activity: Yes     Partners: Male     Birth control/protection: Condom   Lifestyle    Physical activity:     Days per week: Not on file     Minutes per session: Not on file    Stress: Not on file   Relationships    Social connections:     Talks on phone: Not on file     Gets together: Not on file     Attends Worship service: Not on file     Active member of club or organization: Not on file     Attends meetings of clubs or organizations: Not on file     Relationship status: Not on file   Other Topics Concern    Are you pregnant or think you may be? No    Breast-feeding No   Social History Narrative    Lives with her  in [place] with their 2 children. Her oldest sister was molested as a child. Her sister has alcohol and psychological problems. Her father is . She is spiritual. She does [employer] and yoga.        Review of Systems   Constitutional: Negative for appetite change, chills, fatigue, fever and unexpected weight change.   HENT: Negative for congestion, ear pain, mouth sores, nosebleeds, postnasal drip, rhinorrhea, sinus pressure, sneezing, sore throat, trouble swallowing and voice change.    Eyes: Negative for photophobia, pain, discharge, redness, itching and visual disturbance.   Respiratory: Negative for cough, chest tightness and shortness of breath.    Cardiovascular: Negative for chest pain, palpitations and leg swelling.   Gastrointestinal: Negative for abdominal pain, blood in stool, constipation, diarrhea, nausea and vomiting.   Genitourinary: Negative for dysuria, frequency, hematuria and urgency.   Musculoskeletal: Negative for arthralgias, back pain, joint swelling and myalgias.   Skin: Negative for color change and rash.   Allergic/Immunologic: Negative for immunocompromised  "state.   Neurological: Negative for dizziness, seizures, syncope, weakness and headaches.   Hematological: Negative for adenopathy. Does not bruise/bleed easily.   Psychiatric/Behavioral: Negative for agitation, dysphoric mood, sleep disturbance and suicidal ideas. The patient is not nervous/anxious.          Objective:     Vitals:    02/06/20 1537   BP: 114/62   BP Location: Left arm   Patient Position: Sitting   BP Method: Medium (Manual)   Pulse: 80   Resp: 16   Temp: 99.3 °F (37.4 °C)   TempSrc: Oral   SpO2: (!) 93%   Weight: 58.2 kg (128 lb 4.9 oz)   Height: 5' 2" (1.575 m)          Physical Exam   Constitutional: She is oriented to person, place, and time. She appears well-developed and well-nourished.   HENT:   Head: Normocephalic and atraumatic.   Right Ear: External ear normal.   Left Ear: External ear normal.   Nose: Nose normal.   Mouth/Throat: Oropharynx is clear and moist. No oropharyngeal exudate.   Eyes: Pupils are equal, round, and reactive to light. EOM are normal.   Neck: Normal range of motion. Neck supple. No tracheal deviation present. No thyromegaly present.   Cardiovascular: Normal rate, regular rhythm and normal heart sounds.   No murmur heard.  Pulmonary/Chest: Effort normal and breath sounds normal. No respiratory distress.   Abdominal: Soft. She exhibits no distension.   Musculoskeletal: Normal range of motion. She exhibits no edema.   Lymphadenopathy:     She has no cervical adenopathy.   Neurological: She is alert and oriented to person, place, and time. No cranial nerve deficit. Coordination normal.   Skin: Skin is warm and dry. No rash noted.   Psychiatric: She has a normal mood and affect.         Assessment:         ICD-10-CM ICD-9-CM   1. Attention deficit hyperactivity disorder (ADHD), combined type F90.2 314.01   2. Recurrent cold sores B00.1 054.9       Plan:       Attention deficit hyperactivity disorder (ADHD), combined type  -  Patient is controlled on present medication.  " Patient is followed monthly for ADHD follow up.  IN July 2020 will be 1 year that I have been monitoring patient on present dose - if still stable in July 2020 - will get wellness exam and change to 3 month visits.  -     methylphenidate HCl (RITALIN) 20 MG tablet; Take 1 tablet by mouth in the morning and 1 tablet in the evening  Dispense: 60 tablet; Refill: 0    Recurrent cold sores  -  Valtrex prn      Follow up in about 30 days (around 3/7/2020) for med check.     Patient's Medications   New Prescriptions    No medications on file   Previous Medications    ALCLOMETHASONE (ACLOVATE) 0.05 % CREAM    AAA qhs to lip for 1 week and prn redness or scaling or itching    VALACYCLOVIR (VALTREX) 1000 MG TABLET    Take 2 tablets at onset of cold sore and repeat in 12 hours for 1 additional dose as need for cold sore outbreak   Modified Medications    Modified Medication Previous Medication    METHYLPHENIDATE HCL (RITALIN) 20 MG TABLET methylphenidate HCl (RITALIN) 20 MG tablet       Take 1 tablet by mouth in the morning and 1 tablet in the evening    Take 1 tablet by mouth in the morning and 1 tablet in the evening   Discontinued Medications    No medications on file

## 2020-02-10 ENCOUNTER — OFFICE VISIT (OUTPATIENT)
Dept: DERMATOLOGY | Facility: CLINIC | Age: 40
End: 2020-02-10
Payer: COMMERCIAL

## 2020-02-10 DIAGNOSIS — L70.0 ACNE VULGARIS: Primary | ICD-10-CM

## 2020-02-10 DIAGNOSIS — D22.9 NEVUS: ICD-10-CM

## 2020-02-10 PROCEDURE — 99999 PR PBB SHADOW E&M-EST. PATIENT-LVL II: CPT | Mod: PBBFAC,,, | Performed by: DERMATOLOGY

## 2020-02-10 PROCEDURE — 99213 OFFICE O/P EST LOW 20 MIN: CPT | Mod: S$GLB,,, | Performed by: DERMATOLOGY

## 2020-02-10 PROCEDURE — 99999 PR PBB SHADOW E&M-EST. PATIENT-LVL II: ICD-10-PCS | Mod: PBBFAC,,, | Performed by: DERMATOLOGY

## 2020-02-10 PROCEDURE — 99213 PR OFFICE/OUTPT VISIT, EST, LEVL III, 20-29 MIN: ICD-10-PCS | Mod: S$GLB,,, | Performed by: DERMATOLOGY

## 2020-02-10 NOTE — PROGRESS NOTES
Subjective:       Patient ID:  Karolyn Campbell is a 39 y.o. female who presents for   Chief Complaint   Patient presents with    Dermatitis     Pt here today for dermatitis on upper lip follow up tx with aclovate cream. Tx helped.  Still has bump there that is not changing  Also complains of rashes on the  on the face- usu chin and jaw area.          Review of Systems   Constitutional: Negative for fever and chills.   Skin: Positive for rash.        Objective:    Physical Exam   Constitutional: She appears well-developed and well-nourished. No distress.   Neurological: She is alert and oriented to person, place, and time. She is not disoriented.   Psychiatric: She has a normal mood and affect.   Skin:   Areas Examined (abnormalities noted in diagram):   Head / Face Inspection Performed              Diagram Legend     Erythematous scaling macule/papule c/w actinic keratosis       Vascular papule c/w angioma      Pigmented verrucoid papule/plaque c/w seborrheic keratosis      Yellow umbilicated papule c/w sebaceous hyperplasia      Irregularly shaped tan macule c/w lentigo     1-2 mm smooth white papules consistent with Milia      Movable subcutaneous cyst with punctum c/w epidermal inclusion cyst      Subcutaneous movable cyst c/w pilar cyst      Firm pink to brown papule c/w dermatofibroma      Pedunculated fleshy papule(s) c/w skin tag(s)      Evenly pigmented macule c/w junctional nevus     Mildly variegated pigmented, slightly irregular-bordered macule c/w mildly atypical nevus      Flesh colored to evenly pigmented papule c/w intradermal nevus       Pink pearly papule/plaque c/w basal cell carcinoma      Erythematous hyperkeratotic cursted plaque c/w SCC      Surgical scar with no sign of skin cancer recurrence      Open and closed comedones      Inflammatory papules and pustules      Verrucoid papule consistent consistent with wart     Erythematous eczematous patches and plaques     Dystrophic onycholytic  nail with subungual debris c/w onychomycosis     Umbilicated papule    Erythematous-base heme-crusted tan verrucoid plaque consistent with inflamed seborrheic keratosis     Erythematous Silvery Scaling Plaque c/w Psoriasis     See annotation      Assessment / Plan:        Acne vulgaris  Pt cannot tolerate any retinol products  aczone gel qhs     Nevus  Discussed ABCDE's of nevi.  Monitor for new mole or moles that are becoming bigger, darker, irritated, or developing irregular borders. Brochure provided.  No evidence of BCC on left upper lip            Follow up if symptoms worsen or fail to improve.

## 2020-03-10 ENCOUNTER — OFFICE VISIT (OUTPATIENT)
Dept: FAMILY MEDICINE | Facility: CLINIC | Age: 40
End: 2020-03-10
Payer: COMMERCIAL

## 2020-03-10 ENCOUNTER — PATIENT MESSAGE (OUTPATIENT)
Dept: DERMATOLOGY | Facility: CLINIC | Age: 40
End: 2020-03-10

## 2020-03-10 VITALS
OXYGEN SATURATION: 99 % | SYSTOLIC BLOOD PRESSURE: 110 MMHG | WEIGHT: 128.19 LBS | RESPIRATION RATE: 16 BRPM | BODY MASS INDEX: 23.59 KG/M2 | DIASTOLIC BLOOD PRESSURE: 72 MMHG | HEART RATE: 73 BPM | HEIGHT: 62 IN | TEMPERATURE: 98 F

## 2020-03-10 DIAGNOSIS — F90.2 ATTENTION DEFICIT HYPERACTIVITY DISORDER (ADHD), COMBINED TYPE: ICD-10-CM

## 2020-03-10 PROCEDURE — 99213 OFFICE O/P EST LOW 20 MIN: CPT | Mod: S$GLB,,, | Performed by: NURSE PRACTITIONER

## 2020-03-10 PROCEDURE — 3008F BODY MASS INDEX DOCD: CPT | Mod: CPTII,S$GLB,, | Performed by: NURSE PRACTITIONER

## 2020-03-10 PROCEDURE — 3008F PR BODY MASS INDEX (BMI) DOCUMENTED: ICD-10-PCS | Mod: CPTII,S$GLB,, | Performed by: NURSE PRACTITIONER

## 2020-03-10 PROCEDURE — 99213 PR OFFICE/OUTPT VISIT, EST, LEVL III, 20-29 MIN: ICD-10-PCS | Mod: S$GLB,,, | Performed by: NURSE PRACTITIONER

## 2020-03-10 PROCEDURE — 99999 PR PBB SHADOW E&M-EST. PATIENT-LVL IV: ICD-10-PCS | Mod: PBBFAC,,, | Performed by: NURSE PRACTITIONER

## 2020-03-10 PROCEDURE — 99999 PR PBB SHADOW E&M-EST. PATIENT-LVL IV: CPT | Mod: PBBFAC,,, | Performed by: NURSE PRACTITIONER

## 2020-03-10 RX ORDER — METHYLPHENIDATE HYDROCHLORIDE 20 MG/1
TABLET ORAL
Qty: 60 TABLET | Refills: 0 | Status: SHIPPED | OUTPATIENT
Start: 2020-03-10 | End: 2020-04-07 | Stop reason: SDUPTHER

## 2020-03-10 NOTE — PROGRESS NOTES
Subjective:       Patient ID: Karolyn Campbell is a 39 y.o. female.    Chief Complaint: ADHD (F/U)    Patient is a 39-year-old white female with ADHD, history of HPV, recurrent cold sores, history of anxiety, and chronic allergies that is here today for ADHD medication follow up. I took over ADHD care in July 2019.     Patient reports she was 1st evaluated for ADHD by psychologist in [place] in the 10th grade.  She reports she was diagnosed with ADHD predominantly inattentive and compulsive traits.  She reports taking Ritalin from the 10th to 12th grade and getting off her medicine in the 12th grade after she completed high school. We do not have access to those records of ADHD diagnoses.  She reports that she did not start back on medicine until she was 1st seen by Dr. Linda Pineda on 05/28/2018.  She was started on Adderall XR 10 mg daily.  The Adderall was not well tolerated so patient was changed to Ritalin 10 mg twice daily on 07/13/2018.  She was then increase to Ritalin 20 mg in the morning and 1 in the evening and in July 2018 but felt the 20 mg was too strong so in August 2018 she was put on 15 mg in the morning and 15 mg in the evening.  Patient stayed on Ritalin 15 mg twice daily from August 2018 up until February 27, 2019 when her dose was increased to 20 mg twice daily.  Since February 2019, patient has been on the same dose of 20 mg twice daily and reports she is able to focus and complete tasks without side effects.  She denies any increase in anxiety and no problems with sleep.  Her vital signs are stable on medication.  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     Patient has recurrent cold sores and needs refill on Valtrex.     Patient will be due for fasting labs and WELLNESS EXAM around June 2020.      Current Outpatient Medications   Medication Sig Dispense Refill    methylphenidate HCl (RITALIN) 20 MG tablet Take 1 tablet by mouth in the morning and 1 tablet in  the evening 60 tablet 0    valACYclovir (VALTREX) 1000 MG tablet Take 2 tablets at onset of cold sore and repeat in 12 hours for 1 additional dose as need for cold sore outbreak 30 tablet 3     No current facility-administered medications for this visit.        Past Medical History:   Diagnosis Date    Abnormal Pap smear of cervix     prior to being a pt     ADHD (attention deficit hyperactivity disorder)     Diagnosed by psychologist in [place] in the 10th grade and was on Ritalin from 10th to 12 grade and got off med; she then did not start back on ADHD medication until 2018 with Dr. Linda Pineda here in office    Allergy     Anxiety     Anxiety     Fever blister     HPV (human papilloma virus) infection        Past Surgical History:   Procedure Laterality Date    AUGMENTATION OF BREAST      BREAST SURGERY      breast augmentation in ; and then in  had an implant deflate that needed repair     SECTION      replacement of  breast implant       due to rupture     RHINOPLASTY         Family History   Adopted: Yes   Problem Relation Age of Onset    No Known Problems Daughter     ADD / ADHD Son     Strabismus Son        Social History     Socioeconomic History    Marital status:      Spouse name: Not on file    Number of children: Not on file    Years of education: Not on file    Highest education level: Not on file   Occupational History    Occupation: works curriculum advisor   Social Needs    Financial resource strain: Not on file    Food insecurity:     Worry: Not on file     Inability: Not on file    Transportation needs:     Medical: Not on file     Non-medical: Not on file   Tobacco Use    Smoking status: Former Smoker     Packs/day: 0.25     Years: 10.00     Pack years: 2.50     Types: Cigarettes    Smokeless tobacco: Never Used   Substance and Sexual Activity    Alcohol use: Yes     Comment: once a month - 1 to 2 drinks per  occasion    Drug use: No    Sexual activity: Yes     Partners: Male     Birth control/protection: Condom   Lifestyle    Physical activity:     Days per week: Not on file     Minutes per session: Not on file    Stress: Not on file   Relationships    Social connections:     Talks on phone: Not on file     Gets together: Not on file     Attends Quaker service: Not on file     Active member of club or organization: Not on file     Attends meetings of clubs or organizations: Not on file     Relationship status: Not on file   Other Topics Concern    Are you pregnant or think you may be? No    Breast-feeding No   Social History Narrative    Lives with her  in [place] with their 2 children. Her oldest sister was molested as a child. Her sister has alcohol and psychological problems. Her father is . She is spiritual. She does [employer] and yoga.        Review of Systems   Constitutional: Negative for appetite change, chills, fatigue, fever and unexpected weight change.   HENT: Negative for congestion, ear pain, mouth sores, nosebleeds, postnasal drip, rhinorrhea, sinus pressure, sneezing, sore throat, trouble swallowing and voice change.    Eyes: Negative for photophobia, pain, discharge, redness, itching and visual disturbance.   Respiratory: Negative for cough, chest tightness and shortness of breath.    Cardiovascular: Negative for chest pain, palpitations and leg swelling.   Gastrointestinal: Negative for abdominal pain, blood in stool, constipation, diarrhea, nausea and vomiting.   Genitourinary: Negative for dysuria, frequency, hematuria and urgency.   Musculoskeletal: Negative for arthralgias, back pain, joint swelling and myalgias.   Skin: Negative for color change and rash.   Allergic/Immunologic: Negative for immunocompromised state.   Neurological: Negative for dizziness, seizures, syncope, weakness and headaches.   Hematological: Negative for adenopathy. Does not bruise/bleed easily.  "  Psychiatric/Behavioral: Negative for agitation, dysphoric mood, sleep disturbance and suicidal ideas. The patient is not nervous/anxious.          Objective:     Vitals:    03/10/20 1143   BP: 110/72   BP Location: Right arm   Patient Position: Sitting   BP Method: Large (Manual)   Pulse: 73   Resp: 16   Temp: 98.3 °F (36.8 °C)   TempSrc: Oral   SpO2: 99%   Weight: 58.2 kg (128 lb 3.2 oz)   Height: 5' 2" (1.575 m)          Physical Exam   Constitutional: She is oriented to person, place, and time. She appears well-developed and well-nourished.   HENT:   Head: Normocephalic and atraumatic.   Right Ear: External ear normal.   Left Ear: External ear normal.   Nose: Nose normal.   Mouth/Throat: Oropharynx is clear and moist. No oropharyngeal exudate.   Eyes: Pupils are equal, round, and reactive to light. EOM are normal.   Neck: Normal range of motion. Neck supple. No tracheal deviation present. No thyromegaly present.   Cardiovascular: Normal rate, regular rhythm and normal heart sounds.   No murmur heard.  Pulmonary/Chest: Effort normal and breath sounds normal. No respiratory distress.   Abdominal: Soft. She exhibits no distension.   Musculoskeletal: Normal range of motion. She exhibits no edema.   Lymphadenopathy:     She has no cervical adenopathy.   Neurological: She is alert and oriented to person, place, and time. No cranial nerve deficit. Coordination normal.   Skin: Skin is warm and dry. No rash noted.   Psychiatric: She has a normal mood and affect.         Assessment:         ICD-10-CM ICD-9-CM   1. Attention deficit hyperactivity disorder (ADHD), combined type F90.2 314.01       Plan:       Attention deficit hyperactivity disorder (ADHD), combined type  -  Patient is controlled on present medication.  Patient is followed monthly for ADHD follow up.  IN June 2020 will be 1 year that I have been monitoring patient on present dose - if still stable in June 2020 - will get wellness exam and change to 3 month " visits.  -     methylphenidate HCl (RITALIN) 20 MG tablet; Take 1 tablet by mouth in the morning and 1 tablet in the evening.  Dispense: 60 tablet; Refill: 0      Follow up in about 30 days (around 4/9/2020) for med check.     Patient's Medications   New Prescriptions    No medications on file   Previous Medications    VALACYCLOVIR (VALTREX) 1000 MG TABLET    Take 2 tablets at onset of cold sore and repeat in 12 hours for 1 additional dose as need for cold sore outbreak   Modified Medications    Modified Medication Previous Medication    METHYLPHENIDATE HCL (RITALIN) 20 MG TABLET methylphenidate HCl (RITALIN) 20 MG tablet       Take 1 tablet by mouth in the morning and 1 tablet in the evening.    Take 1 tablet by mouth in the morning and 1 tablet in the evening   Discontinued Medications    ALCLOMETHASONE (ACLOVATE) 0.05 % CREAM    AAA qhs to lip for 1 week and prn redness or scaling or itching

## 2020-03-11 RX ORDER — DAPSONE 75 MG/G
GEL TOPICAL
Qty: 60 G | Refills: 2 | Status: SHIPPED | OUTPATIENT
Start: 2020-03-11 | End: 2020-07-09 | Stop reason: ALTCHOICE

## 2020-03-24 ENCOUNTER — PATIENT MESSAGE (OUTPATIENT)
Dept: FAMILY MEDICINE | Facility: CLINIC | Age: 40
End: 2020-03-24

## 2020-04-07 ENCOUNTER — OFFICE VISIT (OUTPATIENT)
Dept: FAMILY MEDICINE | Facility: CLINIC | Age: 40
End: 2020-04-07
Payer: COMMERCIAL

## 2020-04-07 DIAGNOSIS — F90.2 ATTENTION DEFICIT HYPERACTIVITY DISORDER (ADHD), COMBINED TYPE: ICD-10-CM

## 2020-04-07 PROCEDURE — 99213 OFFICE O/P EST LOW 20 MIN: CPT | Mod: 95,,, | Performed by: NURSE PRACTITIONER

## 2020-04-07 PROCEDURE — 99213 PR OFFICE/OUTPT VISIT, EST, LEVL III, 20-29 MIN: ICD-10-PCS | Mod: 95,,, | Performed by: NURSE PRACTITIONER

## 2020-04-07 RX ORDER — METHYLPHENIDATE HYDROCHLORIDE 20 MG/1
TABLET ORAL
Qty: 60 TABLET | Refills: 0 | Status: SHIPPED | OUTPATIENT
Start: 2020-04-08 | End: 2020-05-06 | Stop reason: SDUPTHER
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
## 2020-05-06 ENCOUNTER — OFFICE VISIT (OUTPATIENT)
Dept: FAMILY MEDICINE | Facility: CLINIC | Age: 40
End: 2020-05-06
Payer: COMMERCIAL

## 2020-05-06 DIAGNOSIS — Z13.0 SCREENING FOR DEFICIENCY ANEMIA: ICD-10-CM

## 2020-05-06 DIAGNOSIS — Z13.29 THYROID DISORDER SCREEN: ICD-10-CM

## 2020-05-06 DIAGNOSIS — Z11.4 SCREENING FOR HIV WITHOUT PRESENCE OF RISK FACTORS: ICD-10-CM

## 2020-05-06 DIAGNOSIS — Z13.220 SCREENING CHOLESTEROL LEVEL: ICD-10-CM

## 2020-05-06 DIAGNOSIS — F90.2 ATTENTION DEFICIT HYPERACTIVITY DISORDER (ADHD), COMBINED TYPE: Primary | ICD-10-CM

## 2020-05-06 DIAGNOSIS — Z13.1 DIABETES MELLITUS SCREENING: ICD-10-CM

## 2020-05-06 DIAGNOSIS — Z00.00 ENCOUNTER FOR BLOOD TEST FOR ROUTINE GENERAL PHYSICAL EXAMINATION: ICD-10-CM

## 2020-05-06 PROCEDURE — 99213 OFFICE O/P EST LOW 20 MIN: CPT | Mod: 95,,, | Performed by: NURSE PRACTITIONER

## 2020-05-06 PROCEDURE — 99213 PR OFFICE/OUTPT VISIT, EST, LEVL III, 20-29 MIN: ICD-10-PCS | Mod: 95,,, | Performed by: NURSE PRACTITIONER

## 2020-05-06 RX ORDER — METHYLPHENIDATE HYDROCHLORIDE 20 MG/1
TABLET ORAL
Qty: 60 TABLET | Refills: 0 | Status: SHIPPED | OUTPATIENT
Start: 2020-05-06 | End: 2020-06-02 | Stop reason: SDUPTHER
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
## 2020-06-02 ENCOUNTER — OFFICE VISIT (OUTPATIENT)
Dept: FAMILY MEDICINE | Facility: CLINIC | Age: 40
End: 2020-06-02
Payer: COMMERCIAL

## 2020-06-02 VITALS
RESPIRATION RATE: 16 BRPM | OXYGEN SATURATION: 95 % | TEMPERATURE: 99 F | SYSTOLIC BLOOD PRESSURE: 106 MMHG | DIASTOLIC BLOOD PRESSURE: 70 MMHG | HEIGHT: 62 IN | HEART RATE: 75 BPM | BODY MASS INDEX: 23.74 KG/M2 | WEIGHT: 129 LBS

## 2020-06-02 DIAGNOSIS — F90.2 ATTENTION DEFICIT HYPERACTIVITY DISORDER (ADHD), COMBINED TYPE: ICD-10-CM

## 2020-06-02 PROCEDURE — 99999 PR PBB SHADOW E&M-EST. PATIENT-LVL IV: CPT | Mod: PBBFAC,,, | Performed by: NURSE PRACTITIONER

## 2020-06-02 PROCEDURE — 3008F PR BODY MASS INDEX (BMI) DOCUMENTED: ICD-10-PCS | Mod: CPTII,S$GLB,, | Performed by: NURSE PRACTITIONER

## 2020-06-02 PROCEDURE — 99213 OFFICE O/P EST LOW 20 MIN: CPT | Mod: S$GLB,,, | Performed by: NURSE PRACTITIONER

## 2020-06-02 PROCEDURE — 99213 PR OFFICE/OUTPT VISIT, EST, LEVL III, 20-29 MIN: ICD-10-PCS | Mod: S$GLB,,, | Performed by: NURSE PRACTITIONER

## 2020-06-02 PROCEDURE — 99999 PR PBB SHADOW E&M-EST. PATIENT-LVL IV: ICD-10-PCS | Mod: PBBFAC,,, | Performed by: NURSE PRACTITIONER

## 2020-06-02 PROCEDURE — 3008F BODY MASS INDEX DOCD: CPT | Mod: CPTII,S$GLB,, | Performed by: NURSE PRACTITIONER

## 2020-06-02 RX ORDER — METHYLPHENIDATE HYDROCHLORIDE 20 MG/1
TABLET ORAL
Qty: 60 TABLET | Refills: 0 | Status: SHIPPED | OUTPATIENT
Start: 2020-06-05 | End: 2020-07-09 | Stop reason: SDUPTHER

## 2020-06-02 NOTE — PROGRESS NOTES
Subjective:       Patient ID: Karolyn Campbell is a 39 y.o. female.    Chief Complaint: ADHD (F/U)    Patient is a 39-year-old white female with ADHD, history of HPV, recurrent cold sores, history of anxiety, and chronic allergies that is here today for ADHD medication follow up. I took over ADHD care in July 2019.     Patient reports she was 1st evaluated for ADHD by psychologist in [place] in the 10th grade.  She reports she was diagnosed with ADHD predominantly inattentive and compulsive traits.  She reports taking Ritalin from the 10th to 12th grade and getting off her medicine in the 12th grade after she completed high school. We do not have access to those records of ADHD diagnoses.  She reports that she did not start back on medicine until she was 1st seen by Dr. Linda Pineda on 05/28/2018.  She was started on Adderall XR 10 mg daily.  The Adderall was not well tolerated so patient was changed to Ritalin 10 mg twice daily on 07/13/2018.  She was then increase to Ritalin 20 mg in the morning and 1 in the evening and in July 2018 but felt the 20 mg was too strong so in August 2018 she was put on 15 mg in the morning and 15 mg in the evening.  Patient stayed on Ritalin 15 mg twice daily from August 2018 up until February 27, 2019 when her dose was increased to 20 mg twice daily.  Since February 2019, patient has been on the same dose of 20 mg twice daily and reports she is able to focus and complete tasks without side effects.  She denies any increase in anxiety and no problems with sleep. 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     Patient has recurrent cold sores and takes Valtrex as needed.     Patient will be due for fasting labs and WELLNESS EXAM around July 2020.        Current Outpatient Medications   Medication Sig Dispense Refill    dapsone (ACZONE) 7.5 % GlwP Apply to affected area qam 60 g 2    [START ON 6/5/2020] methylphenidate HCl (RITALIN) 20 MG tablet Take 1  tablet by mouth in the morning and 1 tablet in the evening. 60 tablet 0    valACYclovir (VALTREX) 1000 MG tablet Take 2 tablets at onset of cold sore and repeat in 12 hours for 1 additional dose as need for cold sore outbreak 30 tablet 3     No current facility-administered medications for this visit.        Past Medical History:   Diagnosis Date    Abnormal Pap smear of cervix     prior to being a pt     ADHD (attention deficit hyperactivity disorder)     Diagnosed by psychologist in [place] in the 10th grade and was on Ritalin from 10th to 12 grade and got off med; she then did not start back on ADHD medication until 2018 with Dr. Linda Pineda here in office    Allergy     Anxiety     Anxiety     Fever blister     HPV (human papilloma virus) infection        Past Surgical History:   Procedure Laterality Date    AUGMENTATION OF BREAST      BREAST SURGERY      breast augmentation in ; and then in  had an implant deflate that needed repair     SECTION      replacement of  breast implant       due to rupture     RHINOPLASTY         Family History   Adopted: Yes   Problem Relation Age of Onset    No Known Problems Daughter     ADD / ADHD Son     Strabismus Son        Social History     Socioeconomic History    Marital status:      Spouse name: Not on file    Number of children: Not on file    Years of education: Not on file    Highest education level: Not on file   Occupational History    Occupation: works curriculum advisor   Social Needs    Financial resource strain: Not hard at all    Food insecurity:     Worry: Never true     Inability: Never true    Transportation needs:     Medical: No     Non-medical: No   Tobacco Use    Smoking status: Former Smoker     Packs/day: 0.25     Years: 10.00     Pack years: 2.50     Types: Cigarettes    Smokeless tobacco: Never Used   Substance and Sexual Activity    Alcohol use: Yes     Frequency:  Monthly or less     Drinks per session: 1 or 2     Binge frequency: Never     Comment: once a month - 1 to 2 drinks per occasion    Drug use: No    Sexual activity: Yes     Partners: Male     Birth control/protection: Condom   Lifestyle    Physical activity:     Days per week: 2 days     Minutes per session: 30 min    Stress: To some extent   Relationships    Social connections:     Talks on phone: More than three times a week     Gets together: Once a week     Attends Spiritism service: Not on file     Active member of club or organization: Yes     Attends meetings of clubs or organizations: More than 4 times per year     Relationship status:    Other Topics Concern    Are you pregnant or think you may be? No    Breast-feeding No   Social History Narrative    Lives with her  in [place] with their 2 children. Her oldest sister was molested as a child. Her sister has alcohol and psychological problems. Her father is . She is spiritual. She does [employer] and yoga.        Review of Systems   Constitutional: Negative for activity change and unexpected weight change.   HENT: Negative for hearing loss, rhinorrhea and trouble swallowing.    Eyes: Negative for discharge and visual disturbance.   Respiratory: Negative for chest tightness and wheezing.    Cardiovascular: Negative for chest pain and palpitations.   Gastrointestinal: Negative for blood in stool, constipation, diarrhea and vomiting.   Endocrine: Negative for polydipsia and polyuria.   Genitourinary: Negative for difficulty urinating, dysuria, hematuria and menstrual problem.   Musculoskeletal: Negative for arthralgias, joint swelling and neck pain.   Neurological: Negative for weakness and headaches.   Psychiatric/Behavioral: Negative for confusion and dysphoric mood.         Objective:     Vitals:    20 1538   BP: 106/70   BP Location: Left arm   Patient Position: Sitting   BP Method: Large (Manual)   Pulse: 75   Resp: 16   Temp:  "98.6 °F (37 °C)   TempSrc: Oral   SpO2: 95%   Weight: 58.5 kg (128 lb 15.5 oz)   Height: 5' 2" (1.575 m)          Physical Exam   Constitutional: She is oriented to person, place, and time. She appears well-developed and well-nourished.   Body mass index is 23.59 kg/m².     HENT:   Head: Normocephalic and atraumatic.   Right Ear: External ear normal.   Left Ear: External ear normal.   Nose: Nose normal.   Mouth/Throat: Oropharynx is clear and moist. No oropharyngeal exudate.   Eyes: Pupils are equal, round, and reactive to light. EOM are normal.   Neck: Normal range of motion. Neck supple. No tracheal deviation present. No thyromegaly present.   Cardiovascular: Normal rate, regular rhythm and normal heart sounds.   No murmur heard.  Pulmonary/Chest: Effort normal and breath sounds normal. No respiratory distress.   Abdominal: Soft. She exhibits no distension.   Musculoskeletal: Normal range of motion. She exhibits no edema.   Lymphadenopathy:     She has no cervical adenopathy.   Neurological: She is alert and oriented to person, place, and time. No cranial nerve deficit. Coordination normal.   Skin: Skin is warm and dry. No rash noted.   Psychiatric: She has a normal mood and affect.         Assessment:         ICD-10-CM ICD-9-CM   1. Attention deficit hyperactivity disorder (ADHD), combined type F90.2 314.01       Plan:       Attention deficit hyperactivity disorder (ADHD), combined type  -     methylphenidate HCl (RITALIN) 20 MG tablet; Take 1 tablet by mouth in the morning and 1 tablet in the evening.  Dispense: 60 tablet; Refill: 0      Follow up in about 5 weeks (around 7/7/2020) for fasting labs and WELLNESS EXAM AS SCHEDULED.     Patient's Medications   New Prescriptions    No medications on file   Previous Medications    DAPSONE (ACZONE) 7.5 % GLWP    Apply to affected area qam    VALACYCLOVIR (VALTREX) 1000 MG TABLET    Take 2 tablets at onset of cold sore and repeat in 12 hours for 1 additional dose as " need for cold sore outbreak   Modified Medications    Modified Medication Previous Medication    METHYLPHENIDATE HCL (RITALIN) 20 MG TABLET methylphenidate HCl (RITALIN) 20 MG tablet       Take 1 tablet by mouth in the morning and 1 tablet in the evening.    Take 1 tablet by mouth in the morning and 1 tablet in the evening.   Discontinued Medications    No medications on file

## 2020-07-09 ENCOUNTER — OFFICE VISIT (OUTPATIENT)
Dept: FAMILY MEDICINE | Facility: CLINIC | Age: 40
End: 2020-07-09
Payer: COMMERCIAL

## 2020-07-09 VITALS
TEMPERATURE: 98 F | RESPIRATION RATE: 16 BRPM | BODY MASS INDEX: 23.85 KG/M2 | DIASTOLIC BLOOD PRESSURE: 80 MMHG | WEIGHT: 129.63 LBS | HEART RATE: 87 BPM | SYSTOLIC BLOOD PRESSURE: 114 MMHG | OXYGEN SATURATION: 96 % | HEIGHT: 62 IN

## 2020-07-09 DIAGNOSIS — B00.1 RECURRENT COLD SORES: ICD-10-CM

## 2020-07-09 DIAGNOSIS — F90.2 ATTENTION DEFICIT HYPERACTIVITY DISORDER (ADHD), COMBINED TYPE: ICD-10-CM

## 2020-07-09 DIAGNOSIS — Z00.00 ANNUAL PHYSICAL EXAM: Primary | ICD-10-CM

## 2020-07-09 PROCEDURE — 99999 PR PBB SHADOW E&M-EST. PATIENT-LVL IV: ICD-10-PCS | Mod: PBBFAC,,, | Performed by: NURSE PRACTITIONER

## 2020-07-09 PROCEDURE — 99396 PREV VISIT EST AGE 40-64: CPT | Mod: S$GLB,,, | Performed by: NURSE PRACTITIONER

## 2020-07-09 PROCEDURE — 99999 PR PBB SHADOW E&M-EST. PATIENT-LVL IV: CPT | Mod: PBBFAC,,, | Performed by: NURSE PRACTITIONER

## 2020-07-09 PROCEDURE — 99396 PR PREVENTIVE VISIT,EST,40-64: ICD-10-PCS | Mod: S$GLB,,, | Performed by: NURSE PRACTITIONER

## 2020-07-09 RX ORDER — METHYLPHENIDATE HYDROCHLORIDE 20 MG/1
TABLET ORAL
Qty: 60 TABLET | Refills: 0 | Status: SHIPPED | OUTPATIENT
Start: 2020-07-09 | End: 2020-08-09 | Stop reason: SDUPTHER

## 2020-07-09 NOTE — PROGRESS NOTES
Subjective:       Patient ID: Karolyn Campbell is a 39 y.o. female.    Chief Complaint: Annual Exam    Patient is a 39-year-old white female with ADHD, history of HPV, recurrent cold sores, history of anxiety, and chronic allergies that is here today for annual physical exam with fasting lab results. I took over ADHD care in July 2019.     Patient reports she was 1st evaluated for ADHD by psychologist in [place] in the 10th grade.  She reports she was diagnosed with ADHD predominantly inattentive and compulsive traits.  She reports taking Ritalin from the 10th to 12th grade and getting off her medicine in the 12th grade after she completed high school. We do not have access to those records of ADHD diagnoses.  She reports that she did not start back on medicine until she was 1st seen by Dr. Linda Pineda on 05/28/2018.  She was started on Adderall XR 10 mg daily.  The Adderall was not well tolerated so patient was changed to Ritalin 10 mg twice daily on 07/13/2018.  She was then increase to Ritalin 20 mg in the morning and 1 in the evening and in July 2018 but felt the 20 mg was too strong so in August 2018 she was put on 15 mg in the morning and 15 mg in the evening.  Patient stayed on Ritalin 15 mg twice daily from August 2018 up until February 27, 2019 when her dose was increased to 20 mg twice daily.  Since February 2019, patient has been on the same dose of 20 mg twice daily and reports she is able to focus and complete tasks without side effects.  She denies any increase in anxiety and no problems with sleep. 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     Patient has recurrent cold sores and takes Valtrex as needed.    Wellness labs:  -  CBC WNL  -  CMP WNL  -  Cholesterol levels are excellent  -  Thyroid screen is normal  -  HIV is negative.    Health Maintenance:  =  Reports had Tdap vaccine in 2012 for graduate school in [place], [place].  She will check records at home and send me  report.        Component      Latest Ref Rng & Units 7/8/2020 5/16/2019 5/23/2018   WBC      3.90 - 12.70 K/uL 5.24 4.20 4.47   RBC      4.00 - 5.40 M/uL 4.65 4.91 4.72   Hemoglobin      12.0 - 16.0 g/dL 13.7 14.5 14.1   Hematocrit      37.0 - 48.5 % 43.1 44.1 43.3   MCV      82 - 98 fL 93 90 92   MCH      27.0 - 31.0 pg 29.5 29.5 29.9   MCHC      32.0 - 36.0 g/dL 31.8 (L) 32.9 32.6   RDW      11.5 - 14.5 % 13.2 12.4 12.5   Platelets      150 - 350 K/uL 330 324 326   MPV      9.2 - 12.9 fL 10.7 10.3 10.0   Immature Granulocytes      0.0 - 0.5 % 0.2     Gran # (ANC)      1.8 - 7.7 K/uL 2.8 2.0 2.5   Immature Grans (Abs)      0.00 - 0.04 K/uL 0.01     Lymph #      1.0 - 4.8 K/uL 1.8 1.7 1.4   Mono #      0.3 - 1.0 K/uL 0.5 0.4 0.4   Eos #      0.0 - 0.5 K/uL 0.1 0.1 0.2   Baso #      0.00 - 0.20 K/uL 0.04 0.04 0.02   nRBC      0 /100 WBC 0     Gran%      38.0 - 73.0 % 53.2 46.8 55.3   Lymph%      18.0 - 48.0 % 34.5 41.2 31.5   Mono%      4.0 - 15.0 % 8.6 9.3 9.4   Eosinophil%      0.0 - 8.0 % 2.7 1.7 3.4   Basophil%      0.0 - 1.9 % 0.8 1.0 0.4   Differential Method       Automated Automated Automated   Sodium      136 - 145 mmol/L 144 144 144   Potassium      3.5 - 5.1 mmol/L 4.0 4.3 4.9   Chloride      95 - 110 mmol/L 109 105 108   CO2      23 - 29 mmol/L 26 27 26   Glucose      70 - 110 mg/dL 103 100 104   BUN, Bld      7 - 17 mg/dL 18 (H) 24 (H) 24 (H)   Creatinine      0.50 - 1.40 mg/dL 0.57 0.58 0.62   Calcium      8.7 - 10.5 mg/dL 9.1 10.1 9.6   PROTEIN TOTAL      6.0 - 8.4 g/dL 7.5 8.4 7.6   Albumin      3.5 - 5.2 g/dL 4.6 4.9 4.4   BILIRUBIN TOTAL      0.1 - 1.0 mg/dL 0.3 0.7 0.2   Alkaline Phosphatase      38 - 126 U/L 56 47 52   AST      15 - 46 U/L 35 22 25   ALT      10 - 44 U/L 17 14 28   Anion Gap      8 - 16 mmol/L 9 12 10   eGFR if African American      >60 mL/min/1.73 m:2 >60.0 >60.0 >60.0   eGFR if non African American      >60 mL/min/1.73 m:2 >60.0 >60.0 >60.0   Cholesterol      120 - 199 mg/dL 187  204 (H) 174   Triglycerides      30 - 150 mg/dL 57 65 61   HDL      40 - 75 mg/dL 78 (H) 69 63   LDL Cholesterol External      63.0 - 159.0 mg/dL 97.6 122.0 98.8   Hdl/Cholesterol Ratio      20.0 - 50.0 % 41.7 33.8 36.2   Total Cholesterol/HDL Ratio      2.0 - 5.0 2.4 3.0 2.8   Non-HDL Cholesterol      mg/dL 109 135 111   TSH      0.400 - 4.000 uIU/mL 3.110 2.080 1.930   HIV 1/2 Ag/Ab      Negative Negative       Current Outpatient Medications   Medication Sig Dispense Refill    methylphenidate HCl (RITALIN) 20 MG tablet Take 1 tablet by mouth in the morning and 1 tablet in the evening. 60 tablet 0    valACYclovir (VALTREX) 1000 MG tablet Take 2 tablets at onset of cold sore and repeat in 12 hours for 1 additional dose as need for cold sore outbreak 30 tablet 3    dapsone (ACZONE) 7.5 % GlwP Apply to affected area [facility] (Patient not taking: Reported on 2020) 60 g 2     No current facility-administered medications for this visit.        Past Medical History:   Diagnosis Date    Abnormal Pap smear of cervix     prior to being a pt     ADHD (attention deficit hyperactivity disorder)     Diagnosed by psychologist in [place] in the 10th grade and was on Ritalin from 10th to 12 grade and got off med; she then did not start back on ADHD medication until 2018 with Dr. Linda Pineda here in office    Allergy     Anxiety     Anxiety     Fever blister     HPV (human papilloma virus) infection        Past Surgical History:   Procedure Laterality Date    AUGMENTATION OF BREAST      BREAST SURGERY      breast augmentation in ; and then in  had an implant deflate that needed repair     SECTION      replacement of  breast implant       due to rupture     RHINOPLASTY         Family History   Adopted: Yes   Problem Relation Age of Onset    No Known Problems Daughter     ADD / ADHD Son     Strabismus Son        Social History     Socioeconomic History    Marital  status:      Spouse name: Not on file    Number of children: Not on file    Years of education: Not on file    Highest education level: Not on file   Occupational History    Occupation: works curriculum advisor   Social Needs    Financial resource strain: Not hard at all    Food insecurity     Worry: Never true     Inability: Never true    Transportation needs     Medical: No     Non-medical: No   Tobacco Use    Smoking status: Former Smoker     Packs/day: 0.25     Years: 10.00     Pack years: 2.50     Types: Cigarettes    Smokeless tobacco: Never Used   Substance and Sexual Activity    Alcohol use: Yes     Frequency: Monthly or less     Drinks per session: 1 or 2     Binge frequency: Never     Comment: once a month - 1 to 2 drinks per occasion    Drug use: No    Sexual activity: Yes     Partners: Male     Birth control/protection: Condom   Lifestyle    Physical activity     Days per week: 2 days     Minutes per session: 30 min    Stress: To some extent   Relationships    Social connections     Talks on phone: More than three times a week     Gets together: Once a week     Attends Holiness service: Not on file     Active member of club or organization: Yes     Attends meetings of clubs or organizations: More than 4 times per year     Relationship status:    Other Topics Concern    Are you pregnant or think you may be? No    Breast-feeding No   Social History Narrative    Lives with her  in [place] with their 2 children. Her oldest sister was molested as a child. Her sister has alcohol and psychological problems. Her father is . She is spiritual. She does [employer] and yoga.        Review of Systems   Constitutional: Negative for appetite change, chills, fatigue, fever and unexpected weight change.   HENT: Negative for congestion, ear pain, mouth sores, nosebleeds, postnasal drip, rhinorrhea, sinus pressure, sneezing, sore throat, trouble swallowing and voice change.    Eyes:  "Negative for photophobia, pain, discharge, redness, itching and visual disturbance.   Respiratory: Negative for cough, chest tightness and shortness of breath.    Cardiovascular: Negative for chest pain, palpitations and leg swelling.   Gastrointestinal: Negative for abdominal pain, blood in stool, constipation, diarrhea, nausea and vomiting.   Genitourinary: Negative for dysuria, frequency, hematuria and urgency.   Musculoskeletal: Negative for arthralgias, back pain, joint swelling and myalgias.   Skin: Negative for color change and rash.   Allergic/Immunologic: Negative for immunocompromised state.   Neurological: Negative for dizziness, seizures, syncope, weakness and headaches.   Hematological: Negative for adenopathy. Does not bruise/bleed easily.   Psychiatric/Behavioral: Negative for agitation, dysphoric mood, sleep disturbance and suicidal ideas. The patient is not nervous/anxious.          Objective:     Vitals:    07/09/20 1304   BP: 114/80   BP Location: Left arm   Patient Position: Sitting   BP Method: Medium (Manual)   Pulse: 87   Resp: 16   Temp: 98.3 °F (36.8 °C)   TempSrc: Oral   SpO2: 96%   Weight: 58.8 kg (129 lb 10.1 oz)   Height: 5' 2" (1.575 m)          Physical Exam  Constitutional:       General: She is not in acute distress.     Appearance: She is well-developed and normal weight. She is not ill-appearing, toxic-appearing or diaphoretic.      Comments: Body mass index is 23.71 kg/m².     HENT:      Head: Normocephalic and atraumatic.      Right Ear: Tympanic membrane and external ear normal. There is no impacted cerumen.      Left Ear: Tympanic membrane and external ear normal. There is no impacted cerumen.      Nose: Nose normal. No congestion or rhinorrhea.      Mouth/Throat:      Pharynx: No oropharyngeal exudate.   Eyes:      General: No scleral icterus.        Right eye: No discharge.         Left eye: No discharge.      Extraocular Movements: Extraocular movements intact.      " Conjunctiva/sclera: Conjunctivae normal.      Pupils: Pupils are equal, round, and reactive to light.   Neck:      Musculoskeletal: Normal range of motion and neck supple.      Thyroid: No thyromegaly.      Trachea: No tracheal deviation.   Cardiovascular:      Rate and Rhythm: Normal rate and regular rhythm.      Heart sounds: Normal heart sounds. No murmur.   Pulmonary:      Effort: Pulmonary effort is normal. No respiratory distress.      Breath sounds: Normal breath sounds. No stridor. No wheezing, rhonchi or rales.   Abdominal:      General: There is no distension.      Palpations: Abdomen is soft. There is no mass.      Tenderness: There is no abdominal tenderness. There is no guarding or rebound.      Hernia: No hernia is present.   Musculoskeletal: Normal range of motion.   Lymphadenopathy:      Cervical: No cervical adenopathy.   Skin:     General: Skin is warm and dry.      Findings: No rash.   Neurological:      Mental Status: She is alert and oriented to person, place, and time.      Cranial Nerves: No cranial nerve deficit.      Coordination: Coordination normal.   Psychiatric:         Mood and Affect: Mood normal.         Behavior: Behavior normal.         Thought Content: Thought content normal.         Judgment: Judgment normal.           Assessment:         ICD-10-CM ICD-9-CM   1. Annual physical exam  Z00.00 V70.0   2. Attention deficit hyperactivity disorder (ADHD), combined type  F90.2 314.01   3. Recurrent cold sores  B00.1 054.9       Plan:       Annual physical exam  -  Patient states had tetanus vaccine in 2012 - will check records at home.  Health Maintenance Summary    TETANUS VACCINE Postponed 7/24/2020 Originally 9/4/1997. Patient Refused   Influenza Vaccine Next Due 9/1/2020     Done 11/5/2019 [employer]: WORKFLOW - HEALTHY PLANET - EXTERNAL DATA - EXTERNAL PROCEDURE DATE - INFLUENZA    Done 9/27/2019 Imm Admin: Influenza - Quadrivalent - PF (6 months and older)   Mammogram Next Due  9/3/2021     Done 9/3/2019 MAMMO DIGITAL SCREENING BILAT WITH TOMOSYNTHESIS_CAD    Done 6/20/2018 MAMMO PREVIOUS    Done 6/15/2018 MAMMO DIGITAL SCREENING BILAT WITH TOMOSYNTHESIS_CAD   Cervical Cancer Screening Next Due 4/29/2022    HIV Screening This plan is no longer active.     Done 7/8/2020 HIV 1 / 2 ANTIBODY   Lipid Panel This plan is no longer active.     Done 7/8/2020 LIPID PANEL     Done 5/16/2019 LIPID PANEL     Done 5/23/2018 LIPID PANEL         Attention deficit hyperactivity disorder (ADHD), combined type  -  Controlled on Ritalin at present dose.  Will now go to 3 month follow up visits - patient will send refill request when needed over portal.  -     methylphenidate HCl (RITALIN) 20 MG tablet; Take 1 tablet by mouth in the morning and 1 tablet in the evening.  Dispense: 60 tablet; Refill: 0    Recurrent cold sores  -  Valtrex prn.    Follow up in about 3 months (around 10/9/2020) for ADHD follow up.     Patient's Medications   New Prescriptions    No medications on file   Previous Medications    VALACYCLOVIR (VALTREX) 1000 MG TABLET    Take 2 tablets at onset of cold sore and repeat in 12 hours for 1 additional dose as need for cold sore outbreak   Modified Medications    Modified Medication Previous Medication    METHYLPHENIDATE HCL (RITALIN) 20 MG TABLET methylphenidate HCl (RITALIN) 20 MG tablet       Take 1 tablet by mouth in the morning and 1 tablet in the evening.    Take 1 tablet by mouth in the morning and 1 tablet in the evening.   Discontinued Medications    DAPSONE (ACZONE) 7.5 % GLWP    Apply to affected area [facility]

## 2020-08-09 DIAGNOSIS — F90.2 ATTENTION DEFICIT HYPERACTIVITY DISORDER (ADHD), COMBINED TYPE: ICD-10-CM

## 2020-08-10 RX ORDER — METHYLPHENIDATE HYDROCHLORIDE 20 MG/1
TABLET ORAL
Qty: 60 TABLET | Refills: 0 | Status: SHIPPED | OUTPATIENT
Start: 2020-08-10 | End: 2020-09-11 | Stop reason: SDUPTHER

## 2020-09-11 DIAGNOSIS — F90.2 ATTENTION DEFICIT HYPERACTIVITY DISORDER (ADHD), COMBINED TYPE: ICD-10-CM

## 2020-09-14 RX ORDER — METHYLPHENIDATE HYDROCHLORIDE 20 MG/1
TABLET ORAL
Qty: 60 TABLET | Refills: 0 | Status: SHIPPED | OUTPATIENT
Start: 2020-09-14 | End: 2020-10-08 | Stop reason: SDUPTHER

## 2020-09-16 ENCOUNTER — OFFICE VISIT (OUTPATIENT)
Dept: URGENT CARE | Facility: CLINIC | Age: 40
End: 2020-09-16
Payer: COMMERCIAL

## 2020-09-16 VITALS
WEIGHT: 127 LBS | HEIGHT: 62 IN | TEMPERATURE: 98 F | BODY MASS INDEX: 23.37 KG/M2 | HEART RATE: 102 BPM | RESPIRATION RATE: 16 BRPM | OXYGEN SATURATION: 99 %

## 2020-09-16 DIAGNOSIS — R43.2 LOSS OF TASTE: Primary | ICD-10-CM

## 2020-09-16 LAB
CTP QC/QA: YES
SARS-COV-2 RDRP RESP QL NAA+PROBE: NEGATIVE

## 2020-09-16 PROCEDURE — 3008F PR BODY MASS INDEX (BMI) DOCUMENTED: ICD-10-PCS | Mod: CPTII,S$GLB,, | Performed by: STUDENT IN AN ORGANIZED HEALTH CARE EDUCATION/TRAINING PROGRAM

## 2020-09-16 PROCEDURE — 3008F BODY MASS INDEX DOCD: CPT | Mod: CPTII,S$GLB,, | Performed by: STUDENT IN AN ORGANIZED HEALTH CARE EDUCATION/TRAINING PROGRAM

## 2020-09-16 PROCEDURE — U0002 COVID-19 LAB TEST NON-CDC: HCPCS | Mod: QW,S$GLB,, | Performed by: STUDENT IN AN ORGANIZED HEALTH CARE EDUCATION/TRAINING PROGRAM

## 2020-09-16 PROCEDURE — U0002: ICD-10-PCS | Mod: QW,S$GLB,, | Performed by: STUDENT IN AN ORGANIZED HEALTH CARE EDUCATION/TRAINING PROGRAM

## 2020-09-16 PROCEDURE — 99213 OFFICE O/P EST LOW 20 MIN: CPT | Mod: S$GLB,,, | Performed by: STUDENT IN AN ORGANIZED HEALTH CARE EDUCATION/TRAINING PROGRAM

## 2020-09-16 PROCEDURE — 99213 PR OFFICE/OUTPT VISIT, EST, LEVL III, 20-29 MIN: ICD-10-PCS | Mod: S$GLB,,, | Performed by: STUDENT IN AN ORGANIZED HEALTH CARE EDUCATION/TRAINING PROGRAM

## 2020-09-16 NOTE — PROGRESS NOTES
"Subjective:       Patient ID: Karolyn Campbell is a 40 y.o. female.    Vitals:  height is 5' 2" (1.575 m) and weight is 57.6 kg (127 lb). Her other (see comments) temperature is 98.1 °F (36.7 °C). Her blood pressure is 134/75 (pended) and her pulse is 102. Her respiration is 16 and oxygen saturation is 99%.     Chief Complaint: COVID-19 Concerns and Loss of Taste and Smell    Pt presents with COVID-19 concerns. Reports since Monday has had mild fatigue with scratchy throat, felt this was typical of her seasonal allergies. Today noticed loss of taste and smell with salt and vinegar almonds and also feels like fever blister is coming on on lower lip, took Valtrex. No known sick contacts. Denied f/c, body aches, GI sx's, or lower respiratory sx's.    Other  This is a new problem. Episode onset: 3 days. The problem occurs constantly. The problem has been unchanged. Associated symptoms include fatigue and a sore throat (scratchy). Pertinent negatives include no abdominal pain, arthralgias, chest pain, chills, congestion, coughing, fever, headaches, joint swelling, myalgias, nausea, neck pain, rash, vertigo, vomiting or weakness. Associated symptoms comments: Anosmia. Nothing aggravates the symptoms. She has tried nothing for the symptoms. The treatment provided no relief.       Constitution: Positive for fatigue. Negative for chills and fever.   HENT: Positive for sore throat (scratchy). Negative for ear pain and congestion.         Loss of Taste and Smell   Neck: Negative for neck pain and painful lymph nodes.   Cardiovascular: Negative for chest pain, leg swelling and sob on exertion.   Eyes: Negative for eye discharge, eye pain, eye redness, double vision and blurred vision.   Respiratory: Negative for chest tightness, cough, shortness of breath, stridor and wheezing.    Gastrointestinal: Negative for abdominal pain, nausea, vomiting and diarrhea.   Genitourinary: Negative for dysuria, frequency, urgency and history of " kidney stones.   Musculoskeletal: Positive for back pain. Negative for joint pain, joint swelling, muscle cramps and muscle ache.   Skin: Negative for color change, rash and lesion.   Allergic/Immunologic: Negative for seasonal allergies.   Neurological: Negative for dizziness, history of vertigo, light-headedness, passing out and headaches.   Hematologic/Lymphatic: Negative for swollen lymph nodes.   Psychiatric/Behavioral: Negative for confusion, nervous/anxious, sleep disturbance and depression. The patient is not nervous/anxious.        Objective:      Physical Exam   Constitutional: She is oriented to person, place, and time. She appears well-developed. No distress.   HENT:   Head: Normocephalic and atraumatic.   Ears:   Right Ear: External ear normal.   Left Ear: External ear normal.   Mouth/Throat: Uvula is midline and mucous membranes are normal. Posterior oropharyngeal erythema present. No oropharyngeal exudate, posterior oropharyngeal edema or tonsillar abscesses. Tonsils are 1+ on the right. Tonsils are 1+ on the left. No tonsillar exudate.   Eyes: EOM are normal.   Cardiovascular: Normal rate, regular rhythm and normal heart sounds. Exam reveals no friction rub.   No murmur heard.  Pulmonary/Chest: Effort normal and breath sounds normal. No respiratory distress. She has no wheezes. She has no rales.   Neurological: She is alert and oriented to person, place, and time. No cranial nerve deficit (CN II-XII grossly intact).   Skin: Skin is warm, dry and no rash. Psychiatric: Her behavior is normal. Judgment and thought content normal.   Nursing note and vitals reviewed.    Recent Lab Results       09/16/20  1551         Acceptable Yes     SARS-CoV-2 RNA, Amplification, Qual Negative             Assessment:       1. Loss of taste        Plan:         Loss of taste  -     POCT COVID-19 Rapid Screening  - discussed negative result with patient. Counseled Symptomatic patient without known exposure  to continue home quarantine/isolation per CDC guidelines in event of false negative rapid COVID test    Follow up if symptoms worsen or fail to improve.    Jarrod Escamilla MD/MPH  [facility]  [facility]

## 2020-09-16 NOTE — PATIENT INSTRUCTIONS
Instructions for Patients with Confirmed or Suspected COVID-19    If you are awaiting your test result, you will either be called or it will be released to the patient portal.  If you have any questions about your test, please visit [email or web address] or call our COVID-19 information line at [phone].      Instructions for non-hospitalized or discharged patients with confirmed or suspected COVID-19:       Stay home except to get medical care.    Separate yourself from other people and animals in your home.    Call ahead before visiting your doctor.    Wear a face mask.    Cover your coughs and sneezes.    Clean your hands often.    Avoid sharing personal household items.    Clean all high-touch surfaces every day.    Monitor your symptoms. Seek prompt medical attention if your illness is worsening (e.g., difficulty breathing). Before seeking care, call your healthcare provider.    If you have a medical emergency and must call 911, notify the dispatcher that you have or are being evaluated for COVID-19. If possible, put on a face mask before emergency medical services arrive.    Use the following symptom-based strategy to return to normal activity following a suspected or confirmed case of COVID-19. Continue isolation until:   o At least 3 days (72 hours) have passed since recovery defined as resolution of fever without the use of fever-reducing medications and improvement in respiratory symptoms (e.g. cough, shortness of breath), and   o At least 10 days have passed since the first positive test.       As one of the next steps, you will receive a call or text from the [place] Department of Health ([facility]) COVID-19 Tracing Team. See the contact information below so you know not to ignore the health departments call. It is important that you contact them back immediately so they can help.     Contact Tracer Number:  [phone]  Caller ID for most carriers: [place] Dept [facility]    What is  contact tracing?   Contact tracing is a process that helps identify everyone who has been in close contact with an infected person. Contact tracers let those people know they may have been exposed and guide them on next steps. Confidentiality is important for everyone; no one will be told who may have exposed them to the virus.   Your involvement is important. The more we know about where and how this virus is spreading, the better chance we have at stopping it from spreading further.  What does exposure mean?   Exposure means you have been within 6 feet for more than 15 minutes with a person who has or had COVID-19.  What kind of questions do the contact tracers ask?   A contact tracer will confirm your basic contact information including name, address, phone number, and next of kin, as well as asking about any symptoms you may have had. Theyll also ask you how you think you may have gotten sick, such as places where you may have been exposed to the virus, and people you were with. Those names will never be shared with anyone outside of that call, and will only be used to help trace and stop the spread of the virus.   I have privacy concerns. How will the state use my information?   Your privacy about your health is important. All calls are completed using call centers that use the appropriate health privacy protection measures (HIPAA compliance), meaning that your patient information is safe. No one will ever ask you any questions related to immigration status. Your health comes first.   Do I have to participate?   You do not have to participate, but we strongly encourage you to. Contact tracing can help us catch and control new outbreaks as theyre developing to keep your friends and family safe.   What if I dont hear from anyone?   If you dont receive a call within 24 hours, you can call the number above right away to inquire about your status. That line is open from 8:00 am - 8:00 p.m., 7 days a  week.  Contact tracing saves lives! Together, we have the power to beat this virus and keep our loved ones and neighbors safe.       Instructions for household members, intimate partners and caregivers in a non-healthcare setting of a patient with confirmed or suspected COVID-19:         Close contacts should monitor their health and call their healthcare provider right away if they develop symptoms suggestive of COVID-19 (e.g., fever, cough, shortness of breath).    Stay home except to get medical care. Separate yourself from other people and animals in the home.   Monitor the patients symptoms. If the patient is getting sicker, call his or her healthcare provider. If the patient has a medical emergency and you need to call 911, notify the dispatch personnel that the patient has or is being evaluated for COVID-19.    Wear a facemask when around other people such as sharing a room or vehicle and before entering a healthcare provider's office.   Cover coughs and sneezes with a tissue. Throw used tissues in a lined trash can immediately and wash hands.   Clean hands often with soap and water for at least 20 seconds or with an alcohol-based hand , rubbing hands together until they feel dry. Avoid touching your eyes, nose, and mouth with unwashed hands.   Clean all high-touch; surfaces every day, including counters, tabletops, doorknobs, bathroom fixtures, toilets, phones, keyboards, tablets, bedside tables, etc. Use a household cleaning spray or wipe according to label instructions.   Avoid sharing personal household items such as dishes, drinking glasses, cups, towels, bedding, etc. After these items are used, they should be washed thoroughly with soap and water.   Continue isolation until:   At least 3 days (72 hours) have passed since recovery defined as resolution of fever without the use of fever-reducing medications and improvement in respiratory symptoms (e.g. cough, shortness of breath),  and    At least 10 days have passed since the patients first positive test.    https://www.cdc.gov/coronavirus/2019-ncov/your-health/index.htm

## 2020-09-19 ENCOUNTER — TELEPHONE (OUTPATIENT)
Dept: URGENT CARE | Facility: CLINIC | Age: 40
End: 2020-09-19

## 2020-09-28 ENCOUNTER — CLINICAL SUPPORT (OUTPATIENT)
Dept: OTHER | Facility: CLINIC | Age: 40
End: 2020-09-28
Payer: COMMERCIAL

## 2020-09-28 ENCOUNTER — OFFICE VISIT (OUTPATIENT)
Dept: OBSTETRICS AND GYNECOLOGY | Facility: CLINIC | Age: 40
End: 2020-09-28
Attending: OBSTETRICS & GYNECOLOGY
Payer: COMMERCIAL

## 2020-09-28 VITALS
WEIGHT: 130.06 LBS | HEIGHT: 62 IN | SYSTOLIC BLOOD PRESSURE: 110 MMHG | TEMPERATURE: 98 F | BODY MASS INDEX: 23.93 KG/M2 | DIASTOLIC BLOOD PRESSURE: 62 MMHG

## 2020-09-28 DIAGNOSIS — Z00.8 ENCOUNTER FOR OTHER GENERAL EXAMINATION: ICD-10-CM

## 2020-09-28 DIAGNOSIS — Z32.02 PREGNANCY TEST NEGATIVE: ICD-10-CM

## 2020-09-28 DIAGNOSIS — Z01.419 ENCOUNTER FOR GYNECOLOGICAL EXAMINATION: Primary | ICD-10-CM

## 2020-09-28 DIAGNOSIS — Z12.31 BREAST CANCER SCREENING BY MAMMOGRAM: ICD-10-CM

## 2020-09-28 LAB
B-HCG UR QL: NEGATIVE
CTP QC/QA: YES

## 2020-09-28 PROCEDURE — 99999 PR PBB SHADOW E&M-EST. PATIENT-LVL III: CPT | Mod: PBBFAC,,, | Performed by: OBSTETRICS & GYNECOLOGY

## 2020-09-28 PROCEDURE — 3008F BODY MASS INDEX DOCD: CPT | Mod: CPTII,S$GLB,, | Performed by: OBSTETRICS & GYNECOLOGY

## 2020-09-28 PROCEDURE — 99396 PR PREVENTIVE VISIT,EST,40-64: ICD-10-PCS | Mod: S$GLB,,, | Performed by: OBSTETRICS & GYNECOLOGY

## 2020-09-28 PROCEDURE — 99396 PREV VISIT EST AGE 40-64: CPT | Mod: S$GLB,,, | Performed by: OBSTETRICS & GYNECOLOGY

## 2020-09-28 PROCEDURE — 3008F PR BODY MASS INDEX (BMI) DOCUMENTED: ICD-10-PCS | Mod: CPTII,S$GLB,, | Performed by: OBSTETRICS & GYNECOLOGY

## 2020-09-28 PROCEDURE — 99999 PR PBB SHADOW E&M-EST. PATIENT-LVL III: ICD-10-PCS | Mod: PBBFAC,,, | Performed by: OBSTETRICS & GYNECOLOGY

## 2020-09-28 NOTE — PROGRESS NOTES
Chief Complaint: well woman exam  Well Woman (Annual Exam)    She is established    Karolyn Campbell is a 40 y.o. female  presents for a well woman exam.    Had episode of bleeding last month with passage of firm rubbery tissue about a 1/2 inch ?? Polyp or decidual cast  No issues since    UPT neg     ROS:  No abdominal pain. No discharge  No breast pain or masses, No rectal bleeding       Cycles:  regular and monthly  LMP: Patient's last menstrual period was 2020 (approximate)..    Contraception: The current method of family planning is vasectomy  Meds per MD: none     Last Pap: 2019 pap & hpv negative  Last MM-3-2019 birads 1 [facility]    Past Medical History:   Diagnosis Date    Abnormal Pap smear of cervix     prior to being a pt     ADHD (attention deficit hyperactivity disorder)     Diagnosed by psychologist in [place] in the 10th grade and was on Ritalin from 10th to 12 grade and got off med; she then did not start back on ADHD medication until 2018 with Dr. Linda Pineda here in office    Allergy     Anxiety     Anxiety     Fever blister     HPV (human papilloma virus) infection        Past Surgical History:   Procedure Laterality Date    AUGMENTATION OF BREAST      BREAST SURGERY      breast augmentation in ; and then in  had an implant deflate that needed repair     SECTION      replacement of  breast implant       due to rupture     RHINOPLASTY         OB History    Para Term  AB Living   5 5 3 0 0 2   SAB TAB Ectopic Multiple Live Births   0 0 0 0 2      # Outcome Date GA Lbr Germain/2nd Weight Sex Delivery Anes PTL Lv   5 Para 2014    F CS-LTranv   MAX   4 Para 2010    M CS-LTranv   MAX   3 Term            2 Term            1 Term                Family History   Adopted: Yes   Problem Relation Age of Onset    No Known Problems Daughter     ADD / ADHD Son     Strabismus Son        Social History     Tobacco Use     "Smoking status: Former Smoker     Packs/day: 0.25     Years: 10.00     Pack years: 2.50     Types: Cigarettes    Smokeless tobacco: Never Used   Substance Use Topics    Alcohol use: Yes     Frequency: Monthly or less     Drinks per session: 1 or 2     Binge frequency: Never     Comment: once a month - 1 to 2 drinks per occasion    Drug use: No         ROS:  GENERAL: Denies weight gain or weight loss. Feeling well overall.   SKIN: Denies rash or lesions.   HEENT: Denies headaches, or vision changes.   CARDIOVASCULAR: Denies palpitations or left sided chest pain.   RESPIRATORY: Denies shortness of breath or dyspnea on exertion.  BREASTS: Denies pain, lumps, or nipple discharge.   ABDOMEN: Denies abdominal pain, constipation, diarrhea, nausea, vomiting, change in appetite or rectal bleeding.   URINARY: Denies frequency, dysuria, hematuria.  NEUROLOGIC: Denies syncope or weakness.   PSYCHIATRIC: Denies depression, anxiety or mood swings.    Physical Exam:  /62   Temp 97.9 °F (36.6 °C)   Ht 5' 2" (1.575 m)   Wt 59 kg (130 lb 1.1 oz)   LMP 09/07/2020 (Approximate)   BMI 23.79 kg/m²     APPEARANCE: Well nourished, well developed, in no acute distress.  AFFECT: WNL, alert and oriented x 3  SKIN: No acne or hirsutism  BREASTS: Symmetrical, no skin changes.                      No nipple discharge.   No palpable masses bilaterally  NODES: No inguinal nor axillary LAD  ABDOMEN: soft Non tender Non distended No masses  PELVIC:   Normal external genitalia without lesions.  Normal hair distribution.   Adequate perineal body, normal urethral meatus.   No signif cystocele or rectocele.  Vagina moist and well rugated without lesions or discharge.    Cervix pink, without lesions, discharge or tenderness.     PAP not performed   Bimanual exam shows uterus to be normal size, regular, mobile and nontender.    Adnexa without masses or tenderness.    EXTREMITIES: No edema.        ASSESSMENT AND PLAN  Karolyn Miller was seen today " for well woman.    Diagnoses and all orders for this visit:    Encounter for gynecological examination    Pregnancy test negative  -     POCT urine pregnancy    Breast cancer screening by mammogram  -     Mammo Digital Screening Bilat w/ Mark; Future    If passage of rubbery tissue recurs pt to call for an u/s  Most likely this is just a decidual cast      Patient was counseled today on A.C.S. Pap guidelines and recommendations for yearly pelvic exams, mammograms and monthly self breast exams; to see her PCP for other health maintenance.     Patient encouraged to register for portal and results will be sent via portal.       Health Maintenance   Topic Date Due    Hepatitis C Screening  1980    TETANUS VACCINE  09/04/1997    Mammogram  09/03/2021    Lipid Panel  Completed

## 2020-09-28 NOTE — PROGRESS NOTES
LMP: Patient's last menstrual period was 2020 (approximate)..    Contraception: The current method of family planning is vasectomy  Meds per MD: none    Last Pap: 2019 pap & hpv negative  Last MM-3-2019 birads 1 [facility]

## 2020-10-02 VITALS — HEIGHT: 62 IN | BODY MASS INDEX: 23.23 KG/M2

## 2020-10-02 LAB
HDLC SERPL-MCNC: 75 MG/DL
POC CHOLESTEROL, LDL (DOCK): 105 MG/DL
POC CHOLESTEROL, TOTAL: 192 MG/DL
POC GLUCOSE, FASTING: 84 MG/DL (ref 60–110)
TRIGL SERPL-MCNC: 61 MG/DL

## 2020-10-06 ENCOUNTER — APPOINTMENT (OUTPATIENT)
Dept: RADIOLOGY | Facility: OTHER | Age: 40
End: 2020-10-06
Attending: OBSTETRICS & GYNECOLOGY
Payer: COMMERCIAL

## 2020-10-06 VITALS — HEIGHT: 62 IN | WEIGHT: 130.06 LBS | BODY MASS INDEX: 23.93 KG/M2

## 2020-10-06 DIAGNOSIS — Z12.31 BREAST CANCER SCREENING BY MAMMOGRAM: ICD-10-CM

## 2020-10-06 PROCEDURE — 77063 BREAST TOMOSYNTHESIS BI: CPT | Mod: 26,,, | Performed by: RADIOLOGY

## 2020-10-06 PROCEDURE — 77067 SCR MAMMO BI INCL CAD: CPT | Mod: TC,PN

## 2020-10-06 PROCEDURE — 77063 MAMMO DIGITAL SCREENING BILAT WITH TOMO: ICD-10-PCS | Mod: 26,,, | Performed by: RADIOLOGY

## 2020-10-06 PROCEDURE — 77067 SCR MAMMO BI INCL CAD: CPT | Mod: 26,,, | Performed by: RADIOLOGY

## 2020-10-06 PROCEDURE — 77067 MAMMO DIGITAL SCREENING BILAT WITH TOMO: ICD-10-PCS | Mod: 26,,, | Performed by: RADIOLOGY

## 2020-10-08 ENCOUNTER — OFFICE VISIT (OUTPATIENT)
Dept: FAMILY MEDICINE | Facility: CLINIC | Age: 40
End: 2020-10-08
Payer: COMMERCIAL

## 2020-10-08 VITALS
HEART RATE: 55 BPM | OXYGEN SATURATION: 98 % | DIASTOLIC BLOOD PRESSURE: 84 MMHG | TEMPERATURE: 98 F | BODY MASS INDEX: 24.09 KG/M2 | RESPIRATION RATE: 16 BRPM | HEIGHT: 62 IN | WEIGHT: 130.94 LBS | SYSTOLIC BLOOD PRESSURE: 108 MMHG

## 2020-10-08 DIAGNOSIS — B00.1 RECURRENT COLD SORES: ICD-10-CM

## 2020-10-08 DIAGNOSIS — F90.2 ATTENTION DEFICIT HYPERACTIVITY DISORDER (ADHD), COMBINED TYPE: Primary | ICD-10-CM

## 2020-10-08 PROCEDURE — 3008F BODY MASS INDEX DOCD: CPT | Mod: CPTII,S$GLB,, | Performed by: NURSE PRACTITIONER

## 2020-10-08 PROCEDURE — 99213 PR OFFICE/OUTPT VISIT, EST, LEVL III, 20-29 MIN: ICD-10-PCS | Mod: S$GLB,,, | Performed by: NURSE PRACTITIONER

## 2020-10-08 PROCEDURE — 99999 PR PBB SHADOW E&M-EST. PATIENT-LVL IV: CPT | Mod: PBBFAC,,, | Performed by: NURSE PRACTITIONER

## 2020-10-08 PROCEDURE — 99999 PR PBB SHADOW E&M-EST. PATIENT-LVL IV: ICD-10-PCS | Mod: PBBFAC,,, | Performed by: NURSE PRACTITIONER

## 2020-10-08 PROCEDURE — 99213 OFFICE O/P EST LOW 20 MIN: CPT | Mod: S$GLB,,, | Performed by: NURSE PRACTITIONER

## 2020-10-08 PROCEDURE — 3008F PR BODY MASS INDEX (BMI) DOCUMENTED: ICD-10-PCS | Mod: CPTII,S$GLB,, | Performed by: NURSE PRACTITIONER

## 2020-10-08 RX ORDER — METHYLPHENIDATE HYDROCHLORIDE 20 MG/1
20 TABLET ORAL 2 TIMES DAILY
Qty: 60 TABLET | Refills: 0 | Status: SHIPPED | OUTPATIENT
Start: 2020-10-16 | End: 2021-01-14 | Stop reason: SDUPTHER

## 2020-10-08 RX ORDER — METHYLPHENIDATE HYDROCHLORIDE 20 MG/1
20 TABLET ORAL 2 TIMES DAILY
Qty: 60 TABLET | Refills: 0 | Status: SHIPPED | OUTPATIENT
Start: 2020-11-17 | End: 2021-01-14 | Stop reason: SDUPTHER

## 2020-10-08 RX ORDER — METHYLPHENIDATE HYDROCHLORIDE 20 MG/1
20 TABLET ORAL 2 TIMES DAILY
Qty: 60 TABLET | Refills: 0 | Status: SHIPPED | OUTPATIENT
Start: 2020-12-17 | End: 2021-01-14 | Stop reason: SDUPTHER

## 2020-10-08 NOTE — PROGRESS NOTES
Subjective:       Patient ID: Karolyn Campbell is a 40 y.o. female.    Chief Complaint: Follow-up    Patient is a 39-year-old white female with ADHD, history of HPV, recurrent cold sores, history of anxiety, and chronic allergies that is here today for ADHD follow up. I took over ADHD care in July 2019.     Patient reports she was 1st evaluated for ADHD by psychologist in [place] in the 10th grade.  She reports she was diagnosed with ADHD predominantly inattentive and compulsive traits.  She reports taking Ritalin from the 10th to 12th grade and getting off her medicine in the 12th grade after she completed high school. We do not have access to those records of ADHD diagnoses.  She reports that she did not start back on medicine until she was 1st seen by Dr. Linda Pineda on 05/28/2018.  She was started on Adderall XR 10 mg daily.  The Adderall was not well tolerated so patient was changed to Ritalin 10 mg twice daily on 07/13/2018.  She was then increase to Ritalin 20 mg in the morning and 1 in the evening and in July 2018 but felt the 20 mg was too strong so in August 2018 she was put on 15 mg in the morning and 15 mg in the evening.  Patient stayed on Ritalin 15 mg twice daily from August 2018 up until February 27, 2019 when her dose was increased to 20 mg twice daily.  Since February 2019, patient has been on the same dose of 20 mg twice daily and reports she is able to focus and complete tasks without side effects.  She denies any increase in anxiety and no problems with sleep. 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     Patient has recurrent cold sores and takes Valtrex as needed.      Current Outpatient Medications   Medication Sig Dispense Refill    [START ON 12/17/2020] methylphenidate HCl (RITALIN) 20 MG tablet Take 1 tablet (20 mg total) by mouth 2 (two) times daily. 60 tablet 0    valACYclovir (VALTREX) 1000 MG tablet Take 2 tablets at onset of cold sore and repeat in 12  hours for 1 additional dose as need for cold sore outbreak 30 tablet 3    [START ON 2020] methylphenidate HCl (RITALIN) 20 MG tablet Take 1 tablet (20 mg total) by mouth 2 (two) times daily. 60 tablet 0    [START ON 10/16/2020] methylphenidate HCl (RITALIN) 20 MG tablet Take 1 tablet (20 mg total) by mouth 2 (two) times daily. 60 tablet 0     No current facility-administered medications for this visit.        Past Medical History:   Diagnosis Date    Abnormal Pap smear of cervix     prior to being a pt     ADHD (attention deficit hyperactivity disorder)     Diagnosed by psychologist in [place] in the 10th grade and was on Ritalin from 10th to 12 grade and got off med; she then did not start back on ADHD medication until 2018 with Dr. Linda Pineda here in office    Allergy     Anxiety     Anxiety     Fever blister     HPV (human papilloma virus) infection        Past Surgical History:   Procedure Laterality Date    AUGMENTATION OF BREAST      BREAST SURGERY      breast augmentation in ; and then in  had an implant deflate that needed repair     SECTION      replacement of  breast implant       due to rupture     RHINOPLASTY         Family History   Adopted: Yes   Problem Relation Age of Onset    No Known Problems Daughter     ADD / ADHD Son     Strabismus Son        Social History     Socioeconomic History    Marital status:      Spouse name: Not on file    Number of children: Not on file    Years of education: Not on file    Highest education level: Not on file   Occupational History    Occupation: works curriculum advisor   Social Needs    Financial resource strain: Not hard at all    Food insecurity     Worry: Never true     Inability: Never true    Transportation needs     Medical: No     Non-medical: No   Tobacco Use    Smoking status: Former Smoker     Packs/day: 0.25     Years: 10.00     Pack years: 2.50     Types:  Cigarettes    Smokeless tobacco: Never Used   Substance and Sexual Activity    Alcohol use: Yes     Frequency: Monthly or less     Drinks per session: 1 or 2     Binge frequency: Never     Comment: once a month - 1 to 2 drinks per occasion    Drug use: No    Sexual activity: Yes     Partners: Male     Birth control/protection: Partner-Vasectomy   Lifestyle    Physical activity     Days per week: 2 days     Minutes per session: 30 min    Stress: To some extent   Relationships    Social connections     Talks on phone: More than three times a week     Gets together: Once a week     Attends [employer] service: Not on file     Active member of club or organization: Yes     Attends meetings of clubs or organizations: More than 4 times per year     Relationship status:    Other Topics Concern    Are you pregnant or think you may be? No    Breast-feeding No   Social History Narrative    Lives with her  in [place] with their 2 children. Her oldest sister was molested as a child. Her sister has alcohol and psychological problems. Her father is . She is spiritual. She does yoga.        Review of Systems   Constitutional: Negative for appetite change, chills, fatigue, fever and unexpected weight change.   HENT: Negative for congestion, ear pain, mouth sores, nosebleeds, postnasal drip, rhinorrhea, sinus pressure, sneezing, sore throat, trouble swallowing and voice change.    Eyes: Negative for photophobia, pain, discharge, redness, itching and visual disturbance.   Respiratory: Negative for cough, chest tightness and shortness of breath.    Cardiovascular: Negative for chest pain, palpitations and leg swelling.   Gastrointestinal: Negative for abdominal pain, blood in stool, constipation, diarrhea, nausea and vomiting.   Genitourinary: Negative for dysuria, frequency, hematuria and urgency.   Musculoskeletal: Negative for arthralgias, back pain, joint swelling and myalgias.   Skin: Negative for  "color change and rash.   Allergic/Immunologic: Negative for immunocompromised state.   Neurological: Negative for dizziness, seizures, syncope, weakness and headaches.   Hematological: Negative for adenopathy. Does not bruise/bleed easily.   Psychiatric/Behavioral: Negative for agitation, dysphoric mood, sleep disturbance and suicidal ideas. The patient is not nervous/anxious.          Objective:     Vitals:    10/08/20 1635   BP: 108/84   BP Location: Left arm   Patient Position: Sitting   BP Method: Medium (Manual)   Pulse: (!) 55   Resp: 16   Temp: 98 °F (36.7 °C)   TempSrc: Temporal   SpO2: 98%   Weight: 59.4 kg (130 lb 15.3 oz)   Height: 5' 2" (1.575 m)          Physical Exam  Constitutional:       General: She is not in acute distress.     Appearance: She is well-developed and normal weight. She is not ill-appearing, toxic-appearing or diaphoretic.      Comments: Body mass index is 23.95 kg/m².       HENT:      Head: Normocephalic and atraumatic.      Right Ear: Tympanic membrane and external ear normal. There is no impacted cerumen.      Left Ear: Tympanic membrane and external ear normal. There is no impacted cerumen.      Nose: Nose normal. No congestion or rhinorrhea.      Mouth/Throat:      Pharynx: No oropharyngeal exudate.   Eyes:      General: No scleral icterus.        Right eye: No discharge.         Left eye: No discharge.      Extraocular Movements: Extraocular movements intact.      Conjunctiva/sclera: Conjunctivae normal.      Pupils: Pupils are equal, round, and reactive to light.   Neck:      Musculoskeletal: Normal range of motion and neck supple.      Thyroid: No thyromegaly.      Trachea: No tracheal deviation.   Cardiovascular:      Rate and Rhythm: Normal rate and regular rhythm.      Heart sounds: Normal heart sounds. No murmur.   Pulmonary:      Effort: Pulmonary effort is normal. No respiratory distress.      Breath sounds: Normal breath sounds. No stridor. No wheezing, rhonchi or " rales.   Abdominal:      General: There is no distension.      Palpations: Abdomen is soft. There is no mass.      Tenderness: There is no abdominal tenderness. There is no guarding or rebound.      Hernia: No hernia is present.   Musculoskeletal: Normal range of motion.   Lymphadenopathy:      Cervical: No cervical adenopathy.   Skin:     General: Skin is warm and dry.      Findings: No rash.   Neurological:      Mental Status: She is alert and oriented to person, place, and time.      Cranial Nerves: No cranial nerve deficit.      Coordination: Coordination normal.   Psychiatric:         Mood and Affect: Mood normal.         Behavior: Behavior normal.         Thought Content: Thought content normal.         Judgment: Judgment normal.           Assessment:         ICD-10-CM ICD-9-CM   1. Attention deficit hyperactivity disorder (ADHD), combined type  F90.2 314.01   2. Recurrent cold sores  B00.1 054.9       Plan:       Attention deficit hyperactivity disorder (ADHD), combined type   =  Last filled on 9/18/2020 so prescriptions post-dated from that date - follow up in 3 months.  -     methylphenidate HCl (RITALIN) 20 MG tablet; Take 1 tablet (20 mg total) by mouth 2 (two) times daily.  Dispense: 60 tablet; Refill: 0  -     methylphenidate HCl (RITALIN) 20 MG tablet; Take 1 tablet (20 mg total) by mouth 2 (two) times daily.  Dispense: 60 tablet; Refill: 0  -     methylphenidate HCl (RITALIN) 20 MG tablet; Take 1 tablet (20 mg total) by mouth 2 (two) times daily.  Dispense: 60 tablet; Refill: 0    Recurrent cold sores      Follow up in about 14 weeks (around 1/14/2021) for ADHD follow up.

## 2020-12-17 ENCOUNTER — PATIENT OUTREACH (OUTPATIENT)
Dept: ADMINISTRATIVE | Facility: OTHER | Age: 40
End: 2020-12-17

## 2020-12-17 NOTE — PROGRESS NOTES
Care Everywhere:  Immunization: updated, links delay  Health Maintenance: updated  Media Review:   Legacy Review:   Order placed:   Upcoming appts:

## 2020-12-21 ENCOUNTER — OFFICE VISIT (OUTPATIENT)
Dept: DERMATOLOGY | Facility: CLINIC | Age: 40
End: 2020-12-21
Payer: COMMERCIAL

## 2020-12-21 DIAGNOSIS — L90.5 SCAR: Primary | ICD-10-CM

## 2020-12-21 DIAGNOSIS — D22.9 NEVUS: ICD-10-CM

## 2020-12-21 DIAGNOSIS — L82.0 SEBORRHEIC KERATOSES, INFLAMED: ICD-10-CM

## 2020-12-21 PROCEDURE — 1126F AMNT PAIN NOTED NONE PRSNT: CPT | Mod: S$GLB,,, | Performed by: DERMATOLOGY

## 2020-12-21 PROCEDURE — 99999 PR PBB SHADOW E&M-EST. PATIENT-LVL III: ICD-10-PCS | Mod: PBBFAC,,, | Performed by: DERMATOLOGY

## 2020-12-21 PROCEDURE — 1126F PR PAIN SEVERITY QUANTIFIED, NO PAIN PRESENT: ICD-10-PCS | Mod: S$GLB,,, | Performed by: DERMATOLOGY

## 2020-12-21 PROCEDURE — 99213 PR OFFICE/OUTPT VISIT, EST, LEVL III, 20-29 MIN: ICD-10-PCS | Mod: S$GLB,,, | Performed by: DERMATOLOGY

## 2020-12-21 PROCEDURE — 99999 PR PBB SHADOW E&M-EST. PATIENT-LVL III: CPT | Mod: PBBFAC,,, | Performed by: DERMATOLOGY

## 2020-12-21 PROCEDURE — 99213 OFFICE O/P EST LOW 20 MIN: CPT | Mod: S$GLB,,, | Performed by: DERMATOLOGY

## 2020-12-21 NOTE — PROGRESS NOTES
Subjective:       Patient ID:  Karolyn Campbell is a 40 y.o. female who presents for   Chief Complaint   Patient presents with    Lesion     Pt c/o balding area to scalp. noticed approx 4mos ago. No pain itching or bleeding. No prev tx  Also  Here for upper lip lesion to be rechecked. Feels she gets a lot of acne break outs in this area and she waxes this area      Review of Systems   Constitutional: Negative for fever and chills.   Skin: Positive for daily sunscreen use and activity-related sunscreen use. Negative for tendency to form keloidal scars.   Hematologic/Lymphatic: Bruises/bleeds easily.        Objective:    Physical Exam   Constitutional: She appears well-developed and well-nourished. No distress.   Neurological: She is alert and oriented to person, place, and time. She is not disoriented.   Psychiatric: She has a normal mood and affect.   Skin:   Areas Examined (abnormalities noted in diagram):   Scalp / Hair Palpated and Inspected  Head / Face Inspection Performed              Diagram Legend     Erythematous scaling macule/papule c/w actinic keratosis       Vascular papule c/w angioma      Pigmented verrucoid papule/plaque c/w seborrheic keratosis      Yellow umbilicated papule c/w sebaceous hyperplasia      Irregularly shaped tan macule c/w lentigo     1-2 mm smooth white papules consistent with Milia      Movable subcutaneous cyst with punctum c/w epidermal inclusion cyst      Subcutaneous movable cyst c/w pilar cyst      Firm pink to brown papule c/w dermatofibroma      Pedunculated fleshy papule(s) c/w skin tag(s)      Evenly pigmented macule c/w junctional nevus     Mildly variegated pigmented, slightly irregular-bordered macule c/w mildly atypical nevus      Flesh colored to evenly pigmented papule c/w intradermal nevus       Pink pearly papule/plaque c/w basal cell carcinoma      Erythematous hyperkeratotic cursted plaque c/w SCC      Surgical scar with no sign of skin cancer recurrence       Open and closed comedones      Inflammatory papules and pustules      Verrucoid papule consistent consistent with wart     Erythematous eczematous patches and plaques     Dystrophic onycholytic nail with subungual debris c/w onychomycosis     Umbilicated papule    Erythematous-base heme-crusted tan verrucoid plaque consistent with inflamed seborrheic keratosis     Erythematous Silvery Scaling Plaque c/w Psoriasis     See annotation      Assessment / Plan:        Scar v cicatricial alopecia  Would be unusual to have one focal area if its a scarring alopecia. Pt does not recall trauma  Will monitor for changes    Nevus  Discussed ABCDE's of nevi.  Monitor for new mole or moles that are becoming bigger, darker, irritated, or developing irregular borders. Brochure provided.      Seborrheic keratoses, inflamed  Plan for cryo in the future            Follow up for for cryo isk.

## 2021-01-14 ENCOUNTER — OFFICE VISIT (OUTPATIENT)
Dept: FAMILY MEDICINE | Facility: CLINIC | Age: 41
End: 2021-01-14
Payer: COMMERCIAL

## 2021-01-14 VITALS
TEMPERATURE: 99 F | OXYGEN SATURATION: 99 % | HEIGHT: 62 IN | SYSTOLIC BLOOD PRESSURE: 112 MMHG | WEIGHT: 128.88 LBS | HEART RATE: 82 BPM | DIASTOLIC BLOOD PRESSURE: 76 MMHG | BODY MASS INDEX: 23.72 KG/M2

## 2021-01-14 DIAGNOSIS — F90.2 ATTENTION DEFICIT HYPERACTIVITY DISORDER (ADHD), COMBINED TYPE: ICD-10-CM

## 2021-01-14 PROCEDURE — 99213 OFFICE O/P EST LOW 20 MIN: CPT | Mod: S$GLB,,, | Performed by: NURSE PRACTITIONER

## 2021-01-14 PROCEDURE — 3008F PR BODY MASS INDEX (BMI) DOCUMENTED: ICD-10-PCS | Mod: CPTII,S$GLB,, | Performed by: NURSE PRACTITIONER

## 2021-01-14 PROCEDURE — 3008F BODY MASS INDEX DOCD: CPT | Mod: CPTII,S$GLB,, | Performed by: NURSE PRACTITIONER

## 2021-01-14 PROCEDURE — 99999 PR PBB SHADOW E&M-EST. PATIENT-LVL III: CPT | Mod: PBBFAC,,, | Performed by: NURSE PRACTITIONER

## 2021-01-14 PROCEDURE — 99999 PR PBB SHADOW E&M-EST. PATIENT-LVL III: ICD-10-PCS | Mod: PBBFAC,,, | Performed by: NURSE PRACTITIONER

## 2021-01-14 PROCEDURE — 99213 PR OFFICE/OUTPT VISIT, EST, LEVL III, 20-29 MIN: ICD-10-PCS | Mod: S$GLB,,, | Performed by: NURSE PRACTITIONER

## 2021-01-14 PROCEDURE — 1126F PR PAIN SEVERITY QUANTIFIED, NO PAIN PRESENT: ICD-10-PCS | Mod: S$GLB,,, | Performed by: NURSE PRACTITIONER

## 2021-01-14 PROCEDURE — 1126F AMNT PAIN NOTED NONE PRSNT: CPT | Mod: S$GLB,,, | Performed by: NURSE PRACTITIONER

## 2021-01-14 RX ORDER — METHYLPHENIDATE HYDROCHLORIDE 20 MG/1
20 TABLET ORAL 2 TIMES DAILY
Qty: 60 TABLET | Refills: 0 | Status: SHIPPED | OUTPATIENT
Start: 2021-03-22 | End: 2021-04-20 | Stop reason: SDUPTHER

## 2021-01-14 RX ORDER — METHYLPHENIDATE HYDROCHLORIDE 20 MG/1
20 TABLET ORAL 2 TIMES DAILY
Qty: 60 TABLET | Refills: 0 | Status: SHIPPED | OUTPATIENT
Start: 2021-01-21 | End: 2021-04-20 | Stop reason: SDUPTHER

## 2021-01-14 RX ORDER — METHYLPHENIDATE HYDROCHLORIDE 20 MG/1
20 TABLET ORAL 2 TIMES DAILY
Qty: 60 TABLET | Refills: 0 | Status: SHIPPED | OUTPATIENT
Start: 2021-02-19 | End: 2021-04-20 | Stop reason: SDUPTHER

## 2021-01-31 DIAGNOSIS — B00.1 RECURRENT COLD SORES: ICD-10-CM

## 2021-02-01 RX ORDER — VALACYCLOVIR HYDROCHLORIDE 1 G/1
TABLET, FILM COATED ORAL
Qty: 30 TABLET | Refills: 3 | Status: SHIPPED | OUTPATIENT
Start: 2021-02-01 | End: 2021-09-02 | Stop reason: SDUPTHER

## 2021-02-24 ENCOUNTER — IMMUNIZATION (OUTPATIENT)
Dept: OBSTETRICS AND GYNECOLOGY | Facility: CLINIC | Age: 41
End: 2021-02-24
Payer: COMMERCIAL

## 2021-02-24 DIAGNOSIS — Z23 NEED FOR VACCINATION: Primary | ICD-10-CM

## 2021-02-24 PROCEDURE — 91300 COVID-19, MRNA, LNP-S, PF, 30 MCG/0.3 ML DOSE VACCINE: CPT | Mod: PBBFAC | Performed by: FAMILY MEDICINE

## 2021-03-17 ENCOUNTER — IMMUNIZATION (OUTPATIENT)
Dept: OBSTETRICS AND GYNECOLOGY | Facility: CLINIC | Age: 41
End: 2021-03-17
Payer: COMMERCIAL

## 2021-03-17 DIAGNOSIS — Z23 NEED FOR VACCINATION: Primary | ICD-10-CM

## 2021-03-17 PROCEDURE — 91300 COVID-19, MRNA, LNP-S, PF, 30 MCG/0.3 ML DOSE VACCINE: CPT | Mod: PBBFAC | Performed by: FAMILY MEDICINE

## 2021-03-17 PROCEDURE — 0002A COVID-19, MRNA, LNP-S, PF, 30 MCG/0.3 ML DOSE VACCINE: CPT | Mod: PBBFAC | Performed by: FAMILY MEDICINE

## 2021-04-20 ENCOUNTER — OFFICE VISIT (OUTPATIENT)
Dept: FAMILY MEDICINE | Facility: CLINIC | Age: 41
End: 2021-04-20
Payer: COMMERCIAL

## 2021-04-20 VITALS
HEIGHT: 62 IN | DIASTOLIC BLOOD PRESSURE: 80 MMHG | HEART RATE: 76 BPM | OXYGEN SATURATION: 99 % | WEIGHT: 131.63 LBS | SYSTOLIC BLOOD PRESSURE: 120 MMHG | BODY MASS INDEX: 24.22 KG/M2 | TEMPERATURE: 98 F

## 2021-04-20 DIAGNOSIS — Z13.29 THYROID DISORDER SCREEN: ICD-10-CM

## 2021-04-20 DIAGNOSIS — F90.2 ATTENTION DEFICIT HYPERACTIVITY DISORDER (ADHD), COMBINED TYPE: Primary | ICD-10-CM

## 2021-04-20 DIAGNOSIS — Z00.00 ENCOUNTER FOR BLOOD TEST FOR ROUTINE GENERAL PHYSICAL EXAMINATION: ICD-10-CM

## 2021-04-20 DIAGNOSIS — Z13.0 SCREENING FOR DEFICIENCY ANEMIA: ICD-10-CM

## 2021-04-20 DIAGNOSIS — Z13.1 DIABETES MELLITUS SCREENING: ICD-10-CM

## 2021-04-20 DIAGNOSIS — Z13.220 SCREENING CHOLESTEROL LEVEL: ICD-10-CM

## 2021-04-20 PROCEDURE — 1126F PR PAIN SEVERITY QUANTIFIED, NO PAIN PRESENT: ICD-10-PCS | Mod: S$GLB,,, | Performed by: NURSE PRACTITIONER

## 2021-04-20 PROCEDURE — 99213 PR OFFICE/OUTPT VISIT, EST, LEVL III, 20-29 MIN: ICD-10-PCS | Mod: S$GLB,,, | Performed by: NURSE PRACTITIONER

## 2021-04-20 PROCEDURE — 3008F BODY MASS INDEX DOCD: CPT | Mod: CPTII,S$GLB,, | Performed by: NURSE PRACTITIONER

## 2021-04-20 PROCEDURE — 99999 PR PBB SHADOW E&M-EST. PATIENT-LVL III: CPT | Mod: PBBFAC,,, | Performed by: NURSE PRACTITIONER

## 2021-04-20 PROCEDURE — 99213 OFFICE O/P EST LOW 20 MIN: CPT | Mod: S$GLB,,, | Performed by: NURSE PRACTITIONER

## 2021-04-20 PROCEDURE — 1126F AMNT PAIN NOTED NONE PRSNT: CPT | Mod: S$GLB,,, | Performed by: NURSE PRACTITIONER

## 2021-04-20 PROCEDURE — 3008F PR BODY MASS INDEX (BMI) DOCUMENTED: ICD-10-PCS | Mod: CPTII,S$GLB,, | Performed by: NURSE PRACTITIONER

## 2021-04-20 PROCEDURE — 99999 PR PBB SHADOW E&M-EST. PATIENT-LVL III: ICD-10-PCS | Mod: PBBFAC,,, | Performed by: NURSE PRACTITIONER

## 2021-04-20 RX ORDER — METHYLPHENIDATE HYDROCHLORIDE 20 MG/1
20 TABLET ORAL 2 TIMES DAILY
Qty: 60 TABLET | Refills: 0 | Status: SHIPPED | OUTPATIENT
Start: 2021-05-21 | End: 2021-07-15 | Stop reason: SDUPTHER

## 2021-04-20 RX ORDER — METHYLPHENIDATE HYDROCHLORIDE 20 MG/1
20 TABLET ORAL 2 TIMES DAILY
Qty: 60 TABLET | Refills: 0 | Status: SHIPPED | OUTPATIENT
Start: 2021-06-21 | End: 2021-07-15 | Stop reason: SDUPTHER

## 2021-04-20 RX ORDER — METHYLPHENIDATE HYDROCHLORIDE 20 MG/1
20 TABLET ORAL 2 TIMES DAILY
Qty: 60 TABLET | Refills: 0 | Status: SHIPPED | OUTPATIENT
Start: 2021-04-22 | End: 2021-07-15 | Stop reason: SDUPTHER

## 2021-07-15 ENCOUNTER — OFFICE VISIT (OUTPATIENT)
Dept: FAMILY MEDICINE | Facility: CLINIC | Age: 41
End: 2021-07-15
Payer: COMMERCIAL

## 2021-07-15 VITALS
WEIGHT: 130.5 LBS | BODY MASS INDEX: 24.01 KG/M2 | DIASTOLIC BLOOD PRESSURE: 80 MMHG | SYSTOLIC BLOOD PRESSURE: 108 MMHG | TEMPERATURE: 98 F | HEART RATE: 99 BPM | HEIGHT: 62 IN | RESPIRATION RATE: 16 BRPM | OXYGEN SATURATION: 98 %

## 2021-07-15 DIAGNOSIS — F43.0 STRESS REACTION CAUSING MIXED DISTURBANCE OF EMOTION AND CONDUCT: ICD-10-CM

## 2021-07-15 DIAGNOSIS — F90.2 ATTENTION DEFICIT HYPERACTIVITY DISORDER (ADHD), COMBINED TYPE: ICD-10-CM

## 2021-07-15 DIAGNOSIS — Z23 NEED FOR TDAP VACCINATION: ICD-10-CM

## 2021-07-15 DIAGNOSIS — B00.1 RECURRENT COLD SORES: ICD-10-CM

## 2021-07-15 DIAGNOSIS — D64.9 MILD ANEMIA: ICD-10-CM

## 2021-07-15 DIAGNOSIS — Z00.00 ANNUAL PHYSICAL EXAM: Primary | ICD-10-CM

## 2021-07-15 PROCEDURE — 99999 PR PBB SHADOW E&M-EST. PATIENT-LVL III: CPT | Mod: PBBFAC,,, | Performed by: NURSE PRACTITIONER

## 2021-07-15 PROCEDURE — 99213 PR OFFICE/OUTPT VISIT, EST, LEVL III, 20-29 MIN: ICD-10-PCS | Mod: 25,S$GLB,, | Performed by: NURSE PRACTITIONER

## 2021-07-15 PROCEDURE — 90471 IMMUNIZATION ADMIN: CPT | Mod: S$GLB,,, | Performed by: NURSE PRACTITIONER

## 2021-07-15 PROCEDURE — 99396 PREV VISIT EST AGE 40-64: CPT | Mod: 25,S$GLB,, | Performed by: NURSE PRACTITIONER

## 2021-07-15 PROCEDURE — 99396 PR PREVENTIVE VISIT,EST,40-64: ICD-10-PCS | Mod: 25,S$GLB,, | Performed by: NURSE PRACTITIONER

## 2021-07-15 PROCEDURE — 3008F PR BODY MASS INDEX (BMI) DOCUMENTED: ICD-10-PCS | Mod: CPTII,S$GLB,, | Performed by: NURSE PRACTITIONER

## 2021-07-15 PROCEDURE — 99999 PR PBB SHADOW E&M-EST. PATIENT-LVL III: ICD-10-PCS | Mod: PBBFAC,,, | Performed by: NURSE PRACTITIONER

## 2021-07-15 PROCEDURE — 1126F PR PAIN SEVERITY QUANTIFIED, NO PAIN PRESENT: ICD-10-PCS | Mod: S$GLB,,, | Performed by: NURSE PRACTITIONER

## 2021-07-15 PROCEDURE — 90471 TDAP VACCINE GREATER THAN OR EQUAL TO 7YO IM: ICD-10-PCS | Mod: S$GLB,,, | Performed by: NURSE PRACTITIONER

## 2021-07-15 PROCEDURE — 90715 TDAP VACCINE GREATER THAN OR EQUAL TO 7YO IM: ICD-10-PCS | Mod: S$GLB,,, | Performed by: NURSE PRACTITIONER

## 2021-07-15 PROCEDURE — 99213 OFFICE O/P EST LOW 20 MIN: CPT | Mod: 25,S$GLB,, | Performed by: NURSE PRACTITIONER

## 2021-07-15 PROCEDURE — 1126F AMNT PAIN NOTED NONE PRSNT: CPT | Mod: S$GLB,,, | Performed by: NURSE PRACTITIONER

## 2021-07-15 PROCEDURE — 3008F BODY MASS INDEX DOCD: CPT | Mod: CPTII,S$GLB,, | Performed by: NURSE PRACTITIONER

## 2021-07-15 PROCEDURE — 90715 TDAP VACCINE 7 YRS/> IM: CPT | Mod: S$GLB,,, | Performed by: NURSE PRACTITIONER

## 2021-07-15 RX ORDER — METHYLPHENIDATE HYDROCHLORIDE 20 MG/1
20 TABLET ORAL 2 TIMES DAILY
Qty: 60 TABLET | Refills: 0 | Status: SHIPPED | OUTPATIENT
Start: 2021-08-25 | End: 2021-10-20 | Stop reason: SDUPTHER

## 2021-07-15 RX ORDER — METHYLPHENIDATE HYDROCHLORIDE 20 MG/1
20 TABLET ORAL 2 TIMES DAILY
Qty: 60 TABLET | Refills: 0 | Status: SHIPPED | OUTPATIENT
Start: 2021-07-26 | End: 2021-10-20 | Stop reason: SDUPTHER

## 2021-07-15 RX ORDER — METHYLPHENIDATE HYDROCHLORIDE 20 MG/1
20 TABLET ORAL 2 TIMES DAILY
Qty: 60 TABLET | Refills: 0 | Status: SHIPPED | OUTPATIENT
Start: 2021-09-23 | End: 2021-10-20 | Stop reason: SDUPTHER

## 2021-07-15 RX ORDER — SERTRALINE HYDROCHLORIDE 25 MG/1
25 TABLET, FILM COATED ORAL DAILY
Qty: 30 TABLET | Refills: 2 | Status: SHIPPED | OUTPATIENT
Start: 2021-07-15 | End: 2021-10-20 | Stop reason: SDUPTHER

## 2021-08-25 ENCOUNTER — CLINICAL SUPPORT (OUTPATIENT)
Dept: OTHER | Facility: CLINIC | Age: 41
End: 2021-08-25
Payer: COMMERCIAL

## 2021-08-25 DIAGNOSIS — Z00.8 ENCOUNTER FOR OTHER GENERAL EXAMINATION: ICD-10-CM

## 2021-08-26 VITALS — HEIGHT: 62 IN | BODY MASS INDEX: 23.87 KG/M2

## 2021-08-26 LAB
GLUCOSE SERPL-MCNC: 114 MG/DL (ref 60–140)
HDLC SERPL-MCNC: 63 MG/DL
POC CHOLESTEROL, LDL (DOCK): 98 MG/DL
POC CHOLESTEROL, TOTAL: 175 MG/DL
TRIGL SERPL-MCNC: 74 MG/DL

## 2021-09-02 DIAGNOSIS — B00.1 RECURRENT COLD SORES: ICD-10-CM

## 2021-09-03 RX ORDER — VALACYCLOVIR HYDROCHLORIDE 1 G/1
TABLET, FILM COATED ORAL
Qty: 30 TABLET | Refills: 3 | Status: SHIPPED | OUTPATIENT
Start: 2021-09-03 | End: 2023-02-11 | Stop reason: SDUPTHER

## 2021-10-20 ENCOUNTER — OFFICE VISIT (OUTPATIENT)
Dept: FAMILY MEDICINE | Facility: CLINIC | Age: 41
End: 2021-10-20
Payer: COMMERCIAL

## 2021-10-20 VITALS
RESPIRATION RATE: 16 BRPM | OXYGEN SATURATION: 97 % | BODY MASS INDEX: 23.17 KG/M2 | HEIGHT: 62 IN | SYSTOLIC BLOOD PRESSURE: 116 MMHG | HEART RATE: 67 BPM | TEMPERATURE: 98 F | DIASTOLIC BLOOD PRESSURE: 80 MMHG | WEIGHT: 125.88 LBS

## 2021-10-20 DIAGNOSIS — F90.2 ATTENTION DEFICIT HYPERACTIVITY DISORDER (ADHD), COMBINED TYPE: Primary | ICD-10-CM

## 2021-10-20 DIAGNOSIS — F43.0 STRESS REACTION CAUSING MIXED DISTURBANCE OF EMOTION AND CONDUCT: ICD-10-CM

## 2021-10-20 DIAGNOSIS — Z12.31 ENCOUNTER FOR SCREENING MAMMOGRAM FOR MALIGNANT NEOPLASM OF BREAST: ICD-10-CM

## 2021-10-20 PROCEDURE — 99999 PR PBB SHADOW E&M-EST. PATIENT-LVL III: ICD-10-PCS | Mod: PBBFAC,,, | Performed by: NURSE PRACTITIONER

## 2021-10-20 PROCEDURE — 3074F SYST BP LT 130 MM HG: CPT | Mod: CPTII,S$GLB,, | Performed by: NURSE PRACTITIONER

## 2021-10-20 PROCEDURE — 1160F PR REVIEW ALL MEDS BY PRESCRIBER/CLIN PHARMACIST DOCUMENTED: ICD-10-PCS | Mod: CPTII,S$GLB,, | Performed by: NURSE PRACTITIONER

## 2021-10-20 PROCEDURE — 99214 OFFICE O/P EST MOD 30 MIN: CPT | Mod: S$GLB,,, | Performed by: NURSE PRACTITIONER

## 2021-10-20 PROCEDURE — 1160F RVW MEDS BY RX/DR IN RCRD: CPT | Mod: CPTII,S$GLB,, | Performed by: NURSE PRACTITIONER

## 2021-10-20 PROCEDURE — 3079F PR MOST RECENT DIASTOLIC BLOOD PRESSURE 80-89 MM HG: ICD-10-PCS | Mod: CPTII,S$GLB,, | Performed by: NURSE PRACTITIONER

## 2021-10-20 PROCEDURE — 3008F PR BODY MASS INDEX (BMI) DOCUMENTED: ICD-10-PCS | Mod: CPTII,S$GLB,, | Performed by: NURSE PRACTITIONER

## 2021-10-20 PROCEDURE — 3074F PR MOST RECENT SYSTOLIC BLOOD PRESSURE < 130 MM HG: ICD-10-PCS | Mod: CPTII,S$GLB,, | Performed by: NURSE PRACTITIONER

## 2021-10-20 PROCEDURE — 1159F PR MEDICATION LIST DOCUMENTED IN MEDICAL RECORD: ICD-10-PCS | Mod: CPTII,S$GLB,, | Performed by: NURSE PRACTITIONER

## 2021-10-20 PROCEDURE — 3008F BODY MASS INDEX DOCD: CPT | Mod: CPTII,S$GLB,, | Performed by: NURSE PRACTITIONER

## 2021-10-20 PROCEDURE — 3079F DIAST BP 80-89 MM HG: CPT | Mod: CPTII,S$GLB,, | Performed by: NURSE PRACTITIONER

## 2021-10-20 PROCEDURE — 99999 PR PBB SHADOW E&M-EST. PATIENT-LVL III: CPT | Mod: PBBFAC,,, | Performed by: NURSE PRACTITIONER

## 2021-10-20 PROCEDURE — 1159F MED LIST DOCD IN RCRD: CPT | Mod: CPTII,S$GLB,, | Performed by: NURSE PRACTITIONER

## 2021-10-20 PROCEDURE — 99214 PR OFFICE/OUTPT VISIT, EST, LEVL IV, 30-39 MIN: ICD-10-PCS | Mod: S$GLB,,, | Performed by: NURSE PRACTITIONER

## 2021-10-20 RX ORDER — METHYLPHENIDATE HYDROCHLORIDE 20 MG/1
20 TABLET ORAL 2 TIMES DAILY
Qty: 60 TABLET | Refills: 0 | Status: SHIPPED | OUTPATIENT
Start: 2021-12-22 | End: 2022-01-20 | Stop reason: SDUPTHER

## 2021-10-20 RX ORDER — METHYLPHENIDATE HYDROCHLORIDE 20 MG/1
20 TABLET ORAL 2 TIMES DAILY
Qty: 60 TABLET | Refills: 0 | Status: SHIPPED | OUTPATIENT
Start: 2021-11-22 | End: 2022-01-20 | Stop reason: SDUPTHER

## 2021-10-20 RX ORDER — METHYLPHENIDATE HYDROCHLORIDE 20 MG/1
20 TABLET ORAL 2 TIMES DAILY
Qty: 60 TABLET | Refills: 0 | Status: SHIPPED | OUTPATIENT
Start: 2021-10-22 | End: 2022-01-20 | Stop reason: SDUPTHER

## 2021-10-20 RX ORDER — SERTRALINE HYDROCHLORIDE 25 MG/1
25 TABLET, FILM COATED ORAL DAILY
Qty: 30 TABLET | Refills: 2 | Status: SHIPPED | OUTPATIENT
Start: 2021-10-20 | End: 2022-02-07 | Stop reason: SDUPTHER

## 2021-10-22 ENCOUNTER — TELEPHONE (OUTPATIENT)
Dept: FAMILY MEDICINE | Facility: CLINIC | Age: 41
End: 2021-10-22

## 2021-11-10 ENCOUNTER — APPOINTMENT (OUTPATIENT)
Dept: RADIOLOGY | Facility: OTHER | Age: 41
End: 2021-11-10
Attending: NURSE PRACTITIONER
Payer: COMMERCIAL

## 2021-11-10 VITALS — HEIGHT: 62 IN | BODY MASS INDEX: 23.17 KG/M2 | WEIGHT: 125.88 LBS

## 2021-11-10 DIAGNOSIS — Z12.31 ENCOUNTER FOR SCREENING MAMMOGRAM FOR MALIGNANT NEOPLASM OF BREAST: ICD-10-CM

## 2021-11-10 PROCEDURE — 77067 MAMMO DIGITAL SCREENING BILAT WITH TOMO: ICD-10-PCS | Mod: 26,,, | Performed by: RADIOLOGY

## 2021-11-10 PROCEDURE — 77067 SCR MAMMO BI INCL CAD: CPT | Mod: TC,PN

## 2021-11-10 PROCEDURE — 77063 MAMMO DIGITAL SCREENING BILAT WITH TOMO: ICD-10-PCS | Mod: 26,,, | Performed by: RADIOLOGY

## 2021-11-10 PROCEDURE — 77063 BREAST TOMOSYNTHESIS BI: CPT | Mod: 26,,, | Performed by: RADIOLOGY

## 2021-11-10 PROCEDURE — 77067 SCR MAMMO BI INCL CAD: CPT | Mod: 26,,, | Performed by: RADIOLOGY

## 2022-01-20 ENCOUNTER — OFFICE VISIT (OUTPATIENT)
Dept: FAMILY MEDICINE | Facility: CLINIC | Age: 42
End: 2022-01-20
Payer: COMMERCIAL

## 2022-01-20 VITALS
HEIGHT: 62 IN | WEIGHT: 129.88 LBS | DIASTOLIC BLOOD PRESSURE: 82 MMHG | OXYGEN SATURATION: 100 % | BODY MASS INDEX: 23.9 KG/M2 | HEART RATE: 78 BPM | SYSTOLIC BLOOD PRESSURE: 112 MMHG | TEMPERATURE: 98 F

## 2022-01-20 DIAGNOSIS — F90.2 ATTENTION DEFICIT HYPERACTIVITY DISORDER (ADHD), COMBINED TYPE: Primary | ICD-10-CM

## 2022-01-20 DIAGNOSIS — F43.0 STRESS REACTION CAUSING MIXED DISTURBANCE OF EMOTION AND CONDUCT: ICD-10-CM

## 2022-01-20 PROCEDURE — 3008F BODY MASS INDEX DOCD: CPT | Mod: CPTII,S$GLB,, | Performed by: NURSE PRACTITIONER

## 2022-01-20 PROCEDURE — 99999 PR PBB SHADOW E&M-EST. PATIENT-LVL III: CPT | Mod: PBBFAC,,, | Performed by: NURSE PRACTITIONER

## 2022-01-20 PROCEDURE — 1160F PR REVIEW ALL MEDS BY PRESCRIBER/CLIN PHARMACIST DOCUMENTED: ICD-10-PCS | Mod: CPTII,S$GLB,, | Performed by: NURSE PRACTITIONER

## 2022-01-20 PROCEDURE — 99213 PR OFFICE/OUTPT VISIT, EST, LEVL III, 20-29 MIN: ICD-10-PCS | Mod: S$GLB,,, | Performed by: NURSE PRACTITIONER

## 2022-01-20 PROCEDURE — 3074F SYST BP LT 130 MM HG: CPT | Mod: CPTII,S$GLB,, | Performed by: NURSE PRACTITIONER

## 2022-01-20 PROCEDURE — 1159F MED LIST DOCD IN RCRD: CPT | Mod: CPTII,S$GLB,, | Performed by: NURSE PRACTITIONER

## 2022-01-20 PROCEDURE — 3008F PR BODY MASS INDEX (BMI) DOCUMENTED: ICD-10-PCS | Mod: CPTII,S$GLB,, | Performed by: NURSE PRACTITIONER

## 2022-01-20 PROCEDURE — 3074F PR MOST RECENT SYSTOLIC BLOOD PRESSURE < 130 MM HG: ICD-10-PCS | Mod: CPTII,S$GLB,, | Performed by: NURSE PRACTITIONER

## 2022-01-20 PROCEDURE — 99213 OFFICE O/P EST LOW 20 MIN: CPT | Mod: S$GLB,,, | Performed by: NURSE PRACTITIONER

## 2022-01-20 PROCEDURE — 1160F RVW MEDS BY RX/DR IN RCRD: CPT | Mod: CPTII,S$GLB,, | Performed by: NURSE PRACTITIONER

## 2022-01-20 PROCEDURE — 3079F DIAST BP 80-89 MM HG: CPT | Mod: CPTII,S$GLB,, | Performed by: NURSE PRACTITIONER

## 2022-01-20 PROCEDURE — 99999 PR PBB SHADOW E&M-EST. PATIENT-LVL III: ICD-10-PCS | Mod: PBBFAC,,, | Performed by: NURSE PRACTITIONER

## 2022-01-20 PROCEDURE — 3079F PR MOST RECENT DIASTOLIC BLOOD PRESSURE 80-89 MM HG: ICD-10-PCS | Mod: CPTII,S$GLB,, | Performed by: NURSE PRACTITIONER

## 2022-01-20 PROCEDURE — 1159F PR MEDICATION LIST DOCUMENTED IN MEDICAL RECORD: ICD-10-PCS | Mod: CPTII,S$GLB,, | Performed by: NURSE PRACTITIONER

## 2022-01-20 RX ORDER — METHYLPHENIDATE HYDROCHLORIDE 20 MG/1
20 TABLET ORAL 2 TIMES DAILY
Qty: 60 TABLET | Refills: 0 | Status: SHIPPED | OUTPATIENT
Start: 2022-02-18 | End: 2022-04-18 | Stop reason: SDUPTHER

## 2022-01-20 RX ORDER — METHYLPHENIDATE HYDROCHLORIDE 20 MG/1
20 TABLET ORAL 2 TIMES DAILY
Qty: 60 TABLET | Refills: 0 | Status: SHIPPED | OUTPATIENT
Start: 2022-01-20 | End: 2022-04-18 | Stop reason: SDUPTHER

## 2022-01-20 RX ORDER — METHYLPHENIDATE HYDROCHLORIDE 20 MG/1
20 TABLET ORAL 2 TIMES DAILY
Qty: 60 TABLET | Refills: 0 | Status: SHIPPED | OUTPATIENT
Start: 2022-03-18 | End: 2022-04-18 | Stop reason: SDUPTHER

## 2022-01-20 NOTE — PROGRESS NOTES
"Subjective:       Patient ID: Karolyn Campbell is a 41 y.o. female.    Chief Complaint: Follow-up (3 month follow up )    HPI    Patient is a 41-year-old white female with ADHD, history of HPV, recurrent cold sores, stress reaction with anxiety, and chronic allergies that is here today for 3 month follow up.     Patient reports she was 1st evaluated for ADHD by psychologist in [place] in the 10th grade.  She reports she was diagnosed with ADHD predominantly inattentive and compulsive traits.  She reports taking Ritalin from the 10th to 12th grade and getting off her medicine in the 12th grade after she completed high school. We do not have access to those records of ADHD diagnoses.  She reports that she did not start back on medicine until she was 1st seen by Dr. Linda Pineda on 05/28/2018.  She was started on Adderall XR 10 mg daily.  The Adderall was not well tolerated so patient was changed to Ritalin 10 mg twice daily on 07/13/2018.  She was then increase to Ritalin 20 mg in the morning and 1 in the evening and in July 2018 but felt the 20 mg was too strong so in August 2018 she was put on 15 mg in the morning and 15 mg in the evening.  Patient stayed on Ritalin 15 mg twice daily from August 2018 up until February 27, 2019 when her dose was increased to 20 mg twice daily.  Since February 2019, patient has been on the same dose of 20 mg twice daily and reports she is able to focus and complete tasks without side effects.  She reports no problems with sleep. 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  /82   Pulse 78   Temp 97.5 °F (36.4 °C) (Oral)   Ht 5' 2" (1.575 m)   Wt 58.9 kg (129 lb 13.6 oz)   LMP 01/05/2022   SpO2 100%   BMI 23.75 kg/m²         Patient has recurrent cold sores and takes Valtrex as needed.     Patient diagnosed in July 2021 with acute stress reaction with increased anxiety and started on Zoloft 25 mg daily.  She had reported her in-law parents were " having some health issues, stressors at home with the kids,  works in the [employer] that has been slow due to COVID, financial stressors.  She reports the increased life stressors were causing anxiousness, irritability, short temper, easily agitated, etc. Patient reports that the Zoloft has been effective and stress reaction is improved on medication.     Current Outpatient Medications   Medication Sig Dispense Refill    methylphenidate HCl (RITALIN) 20 MG tablet Take 1 tablet (20 mg total) by mouth 2 (two) times daily. 60 tablet 0    methylphenidate HCl (RITALIN) 20 MG tablet Take 1 tablet (20 mg total) by mouth 2 (two) times daily. 60 tablet 0    methylphenidate HCl (RITALIN) 20 MG tablet Take 1 tablet (20 mg total) by mouth 2 (two) times daily. 60 tablet 0    sertraline (ZOLOFT) 25 MG tablet Take 1 tablet (25 mg total) by mouth once daily. 30 tablet 2    valACYclovir (VALTREX) 1000 MG tablet Take 2 tablets at onset of cold sore and repeat in 12 hours for 1 additional dose as need for cold sore outbreak 30 tablet 3     No current facility-administered medications for this visit.       Past Medical History:   Diagnosis Date    Abnormal Pap smear of cervix     prior to being a pt     ADHD (attention deficit hyperactivity disorder)     Diagnosed by psychologist in [place] in the 10th grade and was on Ritalin from 10th to 12 grade and got off med; she then did not start back on ADHD medication until 2018 with Dr. Linda Pineda here in office    Allergy     Anxiety     Anxiety     Fever blister     HPV (human papilloma virus) infection        Past Surgical History:   Procedure Laterality Date    AUGMENTATION OF BREAST      BREAST SURGERY      breast augmentation in ; and then in  had an implant deflate that needed repair     SECTION      replacement of  breast implant       due to rupture     RHINOPLASTY         Family History   Adopted: Yes    Problem Relation Age of Onset    No Known Problems Daughter     ADD / ADHD Son     Strabismus Son        Social History     Socioeconomic History    Marital status:    Occupational History    Occupation: works curriculum advisor   Tobacco Use    Smoking status: Former Smoker     Packs/day: 0.25     Years: 10.00     Pack years: 2.50     Types: Cigarettes    Smokeless tobacco: Never Used   Substance and Sexual Activity    Alcohol use: Yes     Comment: once a month - 1 to 2 drinks per occasion    Drug use: No    Sexual activity: Yes     Partners: Male     Birth control/protection: Partner-Vasectomy   Other Topics Concern    Are you pregnant or think you may be? No    Breast-feeding No   Social History Narrative    Lives with her  in [place] with their 2 children. Her oldest sister was molested as a child. Her sister has alcohol and psychological problems. Her father is . She is spiritual. She does yoga.        Review of Systems   Constitutional: Negative for appetite change, chills, fatigue, fever and unexpected weight change.   HENT: Negative for congestion, ear pain, mouth sores, nosebleeds, postnasal drip, rhinorrhea, sinus pressure, sneezing, sore throat, trouble swallowing and voice change.    Eyes: Negative for photophobia, pain, discharge, redness, itching and visual disturbance.   Respiratory: Negative for cough, chest tightness and shortness of breath.    Cardiovascular: Negative for chest pain, palpitations and leg swelling.   Gastrointestinal: Negative for abdominal pain, blood in stool, constipation, diarrhea, nausea and vomiting.   Genitourinary: Negative for dysuria, frequency, hematuria and urgency.   Musculoskeletal: Negative for arthralgias, back pain, joint swelling and myalgias.   Skin: Negative for color change and rash.   Allergic/Immunologic: Negative for immunocompromised state.   Neurological: Negative for dizziness, seizures, syncope, weakness and headaches.  "  Hematological: Negative for adenopathy. Does not bruise/bleed easily.   Psychiatric/Behavioral: Negative for agitation, dysphoric mood, sleep disturbance and suicidal ideas. The patient is not nervous/anxious.          Objective:     Vitals:    01/20/22 1633   BP: 112/82   Pulse: 78   Temp: 97.5 °F (36.4 °C)   TempSrc: Oral   SpO2: 100%   Weight: 58.9 kg (129 lb 13.6 oz)   Height: 5' 2" (1.575 m)          Physical Exam  Constitutional:       General: She is not in acute distress.     Appearance: Normal appearance. She is well-developed and normal weight. She is not ill-appearing, toxic-appearing or diaphoretic.      Comments: Body mass index is 23.02 kg/m².   HENT:      Head: Normocephalic and atraumatic.   Eyes:      General: No scleral icterus.        Right eye: No discharge.         Left eye: No discharge.      Extraocular Movements: Extraocular movements intact.      Conjunctiva/sclera: Conjunctivae normal.   Neck:      Thyroid: No thyromegaly.      Trachea: No tracheal deviation.   Cardiovascular:      Rate and Rhythm: Normal rate and regular rhythm.      Heart sounds: Normal heart sounds. No murmur heard.      Pulmonary:      Effort: Pulmonary effort is normal. No respiratory distress.      Breath sounds: Normal breath sounds.   Abdominal:      General: There is no distension.      Tenderness: There is no rebound.   Musculoskeletal:         General: No swelling or deformity. Normal range of motion.      Cervical back: Normal range of motion and neck supple.      Right lower leg: No edema.      Left lower leg: No edema.   Lymphadenopathy:      Cervical: No cervical adenopathy.   Skin:     General: Skin is warm and dry.      Findings: No rash.   Neurological:      Mental Status: She is alert and oriented to person, place, and time.      Cranial Nerves: No cranial nerve deficit.      Coordination: Coordination normal.   Psychiatric:         Mood and Affect: Mood normal.         Behavior: Behavior normal.        "  Thought Content: Thought content normal.         Judgment: Judgment normal.           Assessment:         ICD-10-CM ICD-9-CM   1. Attention deficit hyperactivity disorder (ADHD), combined type  F90.2 314.01   2. 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  Follow up in 3 months.  -     methylphenidate HCl (RITALIN) 20 MG tablet; Take 1 tablet (20 mg total) by mouth 2 (two) times daily.  Dispense: 60 tablet; Refill: 0  -     methylphenidate HCl (RITALIN) 20 MG tablet; Take 1 tablet (20 mg total) by mouth 2 (two) times daily.  Dispense: 60 tablet; Refill: 0  -     methylphenidate HCl (RITALIN) 20 MG tablet; Take 1 tablet (20 mg total) by mouth 2 (two) times daily.  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  Follow up in 3 months.      Follow up in about 3 months (around 4/20/2022) for ADHD follow up.     Patient's Medications   New Prescriptions    No medications on file   Previous Medications    SERTRALINE (ZOLOFT) 25 MG TABLET    Take 1 tablet (25 mg total) by mouth once daily.    VALACYCLOVIR (VALTREX) 1000 MG TABLET    Take 2 tablets at onset of cold sore and repeat in 12 hours for 1 additional dose as need for cold sore outbreak   Modified Medications    Modified Medication Previous Medication    METHYLPHENIDATE HCL (RITALIN) 20 MG TABLET methylphenidate HCl (RITALIN) 20 MG tablet       Take 1 tablet (20 mg total) by mouth 2 (two) times daily.    Take 1 tablet (20 mg total) by mouth 2 (two) times daily.    METHYLPHENIDATE HCL (RITALIN) 20 MG TABLET methylphenidate HCl (RITALIN) 20 MG tablet       Take 1 tablet (20 mg total) by mouth 2 (two) times daily.    Take 1 tablet (20 mg total) by mouth 2 (two) times daily.    METHYLPHENIDATE HCL (RITALIN) 20 MG TABLET methylphenidate HCl  (RITALIN) 20 MG tablet       Take 1 tablet (20 mg total) by mouth 2 (two) times daily.    Take 1 tablet (20 mg total) by mouth 2 (two) times daily.   Discontinued Medications    No medications on file

## 2022-02-07 DIAGNOSIS — F43.0 STRESS REACTION CAUSING MIXED DISTURBANCE OF EMOTION AND CONDUCT: ICD-10-CM

## 2022-02-07 RX ORDER — SERTRALINE HYDROCHLORIDE 25 MG/1
25 TABLET, FILM COATED ORAL DAILY
Qty: 30 TABLET | Refills: 2 | Status: SHIPPED | OUTPATIENT
Start: 2022-02-07 | End: 2022-04-18 | Stop reason: SDUPTHER

## 2022-02-18 DIAGNOSIS — F90.2 ATTENTION DEFICIT HYPERACTIVITY DISORDER (ADHD), COMBINED TYPE: ICD-10-CM

## 2022-02-18 RX ORDER — METHYLPHENIDATE HYDROCHLORIDE 20 MG/1
20 TABLET ORAL 2 TIMES DAILY
Qty: 60 TABLET | Refills: 0 | OUTPATIENT
Start: 2022-02-18

## 2022-04-18 ENCOUNTER — OFFICE VISIT (OUTPATIENT)
Dept: FAMILY MEDICINE | Facility: CLINIC | Age: 42
End: 2022-04-18
Payer: COMMERCIAL

## 2022-04-18 VITALS
HEIGHT: 62 IN | SYSTOLIC BLOOD PRESSURE: 110 MMHG | DIASTOLIC BLOOD PRESSURE: 76 MMHG | BODY MASS INDEX: 23.85 KG/M2 | TEMPERATURE: 98 F | OXYGEN SATURATION: 97 % | HEART RATE: 73 BPM | WEIGHT: 129.63 LBS

## 2022-04-18 DIAGNOSIS — Z13.0 SCREENING FOR DEFICIENCY ANEMIA: ICD-10-CM

## 2022-04-18 DIAGNOSIS — Z13.1 DIABETES MELLITUS SCREENING: ICD-10-CM

## 2022-04-18 DIAGNOSIS — F43.0 STRESS REACTION CAUSING MIXED DISTURBANCE OF EMOTION AND CONDUCT: ICD-10-CM

## 2022-04-18 DIAGNOSIS — Z13.29 THYROID DISORDER SCREEN: ICD-10-CM

## 2022-04-18 DIAGNOSIS — Z11.59 ENCOUNTER FOR HEPATITIS C SCREENING TEST FOR LOW RISK PATIENT: ICD-10-CM

## 2022-04-18 DIAGNOSIS — F90.2 ATTENTION DEFICIT HYPERACTIVITY DISORDER (ADHD), COMBINED TYPE: Primary | ICD-10-CM

## 2022-04-18 DIAGNOSIS — Z13.220 SCREENING CHOLESTEROL LEVEL: ICD-10-CM

## 2022-04-18 DIAGNOSIS — Z00.00 ENCOUNTER FOR BLOOD TEST FOR ROUTINE GENERAL PHYSICAL EXAMINATION: ICD-10-CM

## 2022-04-18 PROCEDURE — 99999 PR PBB SHADOW E&M-EST. PATIENT-LVL III: CPT | Mod: PBBFAC,,, | Performed by: NURSE PRACTITIONER

## 2022-04-18 PROCEDURE — 3074F PR MOST RECENT SYSTOLIC BLOOD PRESSURE < 130 MM HG: ICD-10-PCS | Mod: CPTII,S$GLB,, | Performed by: NURSE PRACTITIONER

## 2022-04-18 PROCEDURE — 99213 PR OFFICE/OUTPT VISIT, EST, LEVL III, 20-29 MIN: ICD-10-PCS | Mod: S$GLB,,, | Performed by: NURSE PRACTITIONER

## 2022-04-18 PROCEDURE — 99999 PR PBB SHADOW E&M-EST. PATIENT-LVL III: ICD-10-PCS | Mod: PBBFAC,,, | Performed by: NURSE PRACTITIONER

## 2022-04-18 PROCEDURE — 1160F RVW MEDS BY RX/DR IN RCRD: CPT | Mod: CPTII,S$GLB,, | Performed by: NURSE PRACTITIONER

## 2022-04-18 PROCEDURE — 3078F DIAST BP <80 MM HG: CPT | Mod: CPTII,S$GLB,, | Performed by: NURSE PRACTITIONER

## 2022-04-18 PROCEDURE — 1160F PR REVIEW ALL MEDS BY PRESCRIBER/CLIN PHARMACIST DOCUMENTED: ICD-10-PCS | Mod: CPTII,S$GLB,, | Performed by: NURSE PRACTITIONER

## 2022-04-18 PROCEDURE — 99213 OFFICE O/P EST LOW 20 MIN: CPT | Mod: S$GLB,,, | Performed by: NURSE PRACTITIONER

## 2022-04-18 PROCEDURE — 1159F PR MEDICATION LIST DOCUMENTED IN MEDICAL RECORD: ICD-10-PCS | Mod: CPTII,S$GLB,, | Performed by: NURSE PRACTITIONER

## 2022-04-18 PROCEDURE — 3008F PR BODY MASS INDEX (BMI) DOCUMENTED: ICD-10-PCS | Mod: CPTII,S$GLB,, | Performed by: NURSE PRACTITIONER

## 2022-04-18 PROCEDURE — 3008F BODY MASS INDEX DOCD: CPT | Mod: CPTII,S$GLB,, | Performed by: NURSE PRACTITIONER

## 2022-04-18 PROCEDURE — 3074F SYST BP LT 130 MM HG: CPT | Mod: CPTII,S$GLB,, | Performed by: NURSE PRACTITIONER

## 2022-04-18 PROCEDURE — 3078F PR MOST RECENT DIASTOLIC BLOOD PRESSURE < 80 MM HG: ICD-10-PCS | Mod: CPTII,S$GLB,, | Performed by: NURSE PRACTITIONER

## 2022-04-18 PROCEDURE — 1159F MED LIST DOCD IN RCRD: CPT | Mod: CPTII,S$GLB,, | Performed by: NURSE PRACTITIONER

## 2022-04-18 RX ORDER — METHYLPHENIDATE HYDROCHLORIDE 20 MG/1
20 TABLET ORAL 2 TIMES DAILY
Qty: 60 TABLET | Refills: 0 | Status: SHIPPED | OUTPATIENT
Start: 2022-04-20 | End: 2022-07-18 | Stop reason: SDUPTHER

## 2022-04-18 RX ORDER — METHYLPHENIDATE HYDROCHLORIDE 20 MG/1
20 TABLET ORAL 2 TIMES DAILY
Qty: 60 TABLET | Refills: 0 | Status: SHIPPED | OUTPATIENT
Start: 2022-06-17 | End: 2022-07-18 | Stop reason: SDUPTHER

## 2022-04-18 RX ORDER — METHYLPHENIDATE HYDROCHLORIDE 20 MG/1
20 TABLET ORAL 2 TIMES DAILY
Qty: 60 TABLET | Refills: 0 | Status: SHIPPED | OUTPATIENT
Start: 2022-05-19 | End: 2022-07-18 | Stop reason: SDUPTHER

## 2022-04-18 RX ORDER — SERTRALINE HYDROCHLORIDE 25 MG/1
25 TABLET, FILM COATED ORAL DAILY
Qty: 30 TABLET | Refills: 2 | Status: SHIPPED | OUTPATIENT
Start: 2022-04-18 | End: 2022-05-09 | Stop reason: DRUGHIGH
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
## 2022-05-02 ENCOUNTER — PATIENT MESSAGE (OUTPATIENT)
Dept: FAMILY MEDICINE | Facility: CLINIC | Age: 42
End: 2022-05-02
Payer: COMMERCIAL

## 2022-05-09 ENCOUNTER — OFFICE VISIT (OUTPATIENT)
Dept: FAMILY MEDICINE | Facility: CLINIC | Age: 42
End: 2022-05-09
Payer: COMMERCIAL

## 2022-05-09 DIAGNOSIS — F90.2 ATTENTION DEFICIT HYPERACTIVITY DISORDER (ADHD), COMBINED TYPE: ICD-10-CM

## 2022-05-09 DIAGNOSIS — F43.0 STRESS REACTION CAUSING MIXED DISTURBANCE OF EMOTION AND CONDUCT: Primary | ICD-10-CM

## 2022-05-09 PROCEDURE — 1160F RVW MEDS BY RX/DR IN RCRD: CPT | Mod: CPTII,95,, | Performed by: NURSE PRACTITIONER

## 2022-05-09 PROCEDURE — 99213 OFFICE O/P EST LOW 20 MIN: CPT | Mod: 95,,, | Performed by: NURSE PRACTITIONER

## 2022-05-09 PROCEDURE — 1160F PR REVIEW ALL MEDS BY PRESCRIBER/CLIN PHARMACIST DOCUMENTED: ICD-10-PCS | Mod: CPTII,95,, | Performed by: NURSE PRACTITIONER

## 2022-05-09 PROCEDURE — 1159F PR MEDICATION LIST DOCUMENTED IN MEDICAL RECORD: ICD-10-PCS | Mod: CPTII,95,, | Performed by: NURSE PRACTITIONER

## 2022-05-09 PROCEDURE — 99213 PR OFFICE/OUTPT VISIT, EST, LEVL III, 20-29 MIN: ICD-10-PCS | Mod: 95,,, | Performed by: NURSE PRACTITIONER

## 2022-05-09 PROCEDURE — 1159F MED LIST DOCD IN RCRD: CPT | Mod: CPTII,95,, | Performed by: NURSE PRACTITIONER

## 2022-05-09 RX ORDER — SERTRALINE HYDROCHLORIDE 50 MG/1
50 TABLET, FILM COATED ORAL DAILY
Qty: 30 TABLET | Refills: 0 | Status: SHIPPED | OUTPATIENT
Start: 2022-05-09 | End: 2022-06-06 | Stop reason: SDUPTHER
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
## 2022-05-31 ENCOUNTER — PATIENT MESSAGE (OUTPATIENT)
Dept: ADMINISTRATIVE | Facility: HOSPITAL | Age: 42
End: 2022-05-31
Payer: COMMERCIAL

## 2022-06-06 ENCOUNTER — OFFICE VISIT (OUTPATIENT)
Dept: FAMILY MEDICINE | Facility: CLINIC | Age: 42
End: 2022-06-06
Payer: COMMERCIAL

## 2022-06-06 DIAGNOSIS — F43.0 STRESS REACTION CAUSING MIXED DISTURBANCE OF EMOTION AND CONDUCT: ICD-10-CM

## 2022-06-06 PROCEDURE — 99213 PR OFFICE/OUTPT VISIT, EST, LEVL III, 20-29 MIN: ICD-10-PCS | Mod: 95,,, | Performed by: NURSE PRACTITIONER

## 2022-06-06 PROCEDURE — 1159F PR MEDICATION LIST DOCUMENTED IN MEDICAL RECORD: ICD-10-PCS | Mod: CPTII,95,, | Performed by: NURSE PRACTITIONER

## 2022-06-06 PROCEDURE — 1160F RVW MEDS BY RX/DR IN RCRD: CPT | Mod: CPTII,95,, | Performed by: NURSE PRACTITIONER

## 2022-06-06 PROCEDURE — 99213 OFFICE O/P EST LOW 20 MIN: CPT | Mod: 95,,, | Performed by: NURSE PRACTITIONER

## 2022-06-06 PROCEDURE — 1159F MED LIST DOCD IN RCRD: CPT | Mod: CPTII,95,, | Performed by: NURSE PRACTITIONER

## 2022-06-06 PROCEDURE — 1160F PR REVIEW ALL MEDS BY PRESCRIBER/CLIN PHARMACIST DOCUMENTED: ICD-10-PCS | Mod: CPTII,95,, | Performed by: NURSE PRACTITIONER

## 2022-06-06 RX ORDER — SERTRALINE HYDROCHLORIDE 50 MG/1
50 TABLET, FILM COATED ORAL DAILY
Qty: 90 TABLET | Refills: 1 | Status: SHIPPED | OUTPATIENT
Start: 2022-06-06 | End: 2023-06-11 | Stop reason: SDUPTHER

## 2022-06-06 NOTE — PROGRESS NOTES
Subjective:       Patient ID: Karolyn Campbell is a 41 y.o. female.    Chief Complaint: Med Change Follow Up (Increased zoloft to 50 mg daily at last visit.)    HPI    The patient location is: [place]  The chief complaint leading to consultation is: anxiety, increased stressors, discuss change in Zoloft dose.     Visit type: audiovisual     Face to Face time with patient: 10  12 minutes of total time spent on the encounter, which includes face to face time and non-face to face time preparing to see the patient (eg, review of tests), Obtaining and/or reviewing separately obtained history, Documenting clinical information in the electronic or other health record, Independently interpreting results (not separately reported) and communicating results to the patient/family/caregiver, or Care coordination (not separately reported).      Each patient to whom he or she provides medical services by telemedicine is:  (1) informed of the relationship between the physician and patient and the respective role of any other health care provider with respect to management of the patient; and (2) notified that he or she may decline to receive medical services by telemedicine and may withdraw from such care at any time.     Notes:   Patient is a 41-year-old white female with ADHD, stress reaction with anxiety, history of HPV, recurrent cold sores,  and chronic allergies that has virtual visit today for medication change follow up.     ADHD  1st evaluated for ADHD by psychologist in [place] in the 10th grade.    Diagnosed with ADHD predominantly inattentive and compulsive traits - NO access to records  Treated by Dr. Linda Pineda on 05/28/2018 - started Adderall XR 10 mg daily but not tolerated   Changed to Ritalin 10 mg twice daily on 07/13/2018.   Since February 2019, patient has been on the same dose of Ritalin 20 mg twice daily.   Able to focus and complete tasks without side effects.  She reports no problems with sleep.    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.     Stress Reaction  Patient diagnosed in July 2021 with acute stress reaction with increased anxiety and started on Zoloft 25 mg daily.    Increased the Zoloft to 50 mg daily May 2022.   Increased dose is working well without side effects.      Recurrent cold sores  takes Valtrex as needed.      Review of Systems   Constitutional: Negative for activity change and unexpected weight change.   HENT: Negative for hearing loss, rhinorrhea and trouble swallowing.    Eyes: Negative for discharge and visual disturbance.   Respiratory: Negative for chest tightness and wheezing.    Cardiovascular: Negative for chest pain and palpitations.   Gastrointestinal: Negative for blood in stool, constipation, diarrhea and vomiting.   Endocrine: Negative for polydipsia and polyuria.   Genitourinary: Negative for difficulty urinating, dysuria, hematuria and menstrual problem.   Musculoskeletal: Negative for arthralgias, joint swelling and neck pain.   Neurological: Negative for weakness and headaches.   Psychiatric/Behavioral: Negative for confusion and dysphoric mood.         Objective:     There were no vitals filed for this visit.       Physical Exam  Constitutional:       General: She is not in acute distress.     Appearance: She is well-developed. She is not diaphoretic.   HENT:      Head: Normocephalic and atraumatic.   Eyes:      General: No scleral icterus.        Right eye: No discharge.         Left eye: No discharge.      Conjunctiva/sclera: Conjunctivae normal.      Pupils: Pupils are equal, round, and reactive to light.   Pulmonary:      Effort: Pulmonary effort is normal. No respiratory distress.   Neurological:      Mental Status: She is alert and oriented to person, place, and time.   Psychiatric:         Mood and Affect: Mood normal.         Behavior: Behavior normal.         Thought Content: Thought content normal.         Judgment: Judgment  normal.           Assessment:         ICD-10-CM ICD-9-CM   1. 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  Still has a lot of stressors at work but not getting overwhelmed and overreacting at this time.  -     sertraline (ZOLOFT) 50 MG tablet; Take 1 tablet (50 mg total) by mouth once daily.  Dispense: 90 tablet; Refill: 1      [facility] July as scheduled..     Patient's Medications   New Prescriptions    No medications on file   Previous Medications    METHYLPHENIDATE HCL (RITALIN) 20 MG TABLET    Take 1 tablet (20 mg total) by mouth 2 (two) times daily.    METHYLPHENIDATE HCL (RITALIN) 20 MG TABLET    Take 1 tablet (20 mg total) by mouth 2 (two) times daily.    METHYLPHENIDATE HCL (RITALIN) 20 MG TABLET    Take 1 tablet (20 mg total) by mouth 2 (two) times daily.    VALACYCLOVIR (VALTREX) 1000 MG TABLET    Take 2 tablets at onset of cold sore and repeat in 12 hours for 1 additional dose as need for cold sore outbreak   Modified Medications    Modified Medication Previous Medication    SERTRALINE (ZOLOFT) 50 MG TABLET sertraline (ZOLOFT) 50 MG tablet       Take 1 tablet (50 mg total) by mouth once daily.    Take 1 tablet (50 mg total) by mouth once daily.   Discontinued Medications    No medications on file       Past Medical History:   Diagnosis Date    Abnormal Pap smear of cervix 2000    prior to being a pt     ADHD (attention deficit hyperactivity disorder)     Diagnosed by psychologist in [place] in the 10th grade and was on Ritalin from 10th to 12 grade and got off med; she then did not start back on ADHD medication until 5/28/2018 with Dr. Linda Pineda here in office    Allergy     Anxiety     Anxiety     Fever blister     HPV (human papilloma virus) infection        Past Surgical History:   Procedure  Laterality Date    AUGMENTATION OF BREAST      BREAST SURGERY      breast augmentation in ; and then in  had an implant deflate that needed repair     SECTION  2010    replacement of  breast implant       due to rupture     RHINOPLASTY  2007       Family History   Adopted: Yes   Problem Relation Age of Onset    No Known Problems Daughter     ADD / ADHD Son     Strabismus Son        Social History     Socioeconomic History    Marital status:    Occupational History    Occupation: works curriculum advisor   Tobacco Use    Smoking status: Former Smoker     Packs/day: 0.25     Years: 5.00     Pack years: 1.25     Types: Cigarettes     Start date: 2003     Quit date: 2009     Years since quittin.7    Smokeless tobacco: Never Used   Substance and Sexual Activity    Alcohol use: Yes     Alcohol/week: 0.0 - 1.0 standard drinks     Comment: once a month - 1 to 2 drinks per occasion    Drug use: No    Sexual activity: Yes     Partners: Male     Birth control/protection: Partner-Vasectomy   Other Topics Concern    Are you pregnant or think you may be? No    Breast-feeding No   Social History Narrative    .  2 children     Social Determinants of Health     Financial Resource Strain: Low Risk     Difficulty of Paying Living Expenses: Not hard at all   Food Insecurity: No Food Insecurity    Worried About Running Out of Food in the Last Year: Never true    Ran Out of Food in the Last Year: Never true   Transportation Needs: No Transportation Needs    Lack of Transportation (Medical): No    Lack of Transportation (Non-Medical): No   Physical Activity: Insufficiently Active    Days of Exercise per Week: 2 days    Minutes of Exercise per Session: 30 min   Stress: Stress Concern Present    Feeling of Stress : To some extent   Social Connections: Unknown    Frequency of Communication with Friends and Family: More than three times a week    Frequency of  Social Gatherings with Friends and Family: Twice a week    Active Member of Clubs or Organizations: Yes    Attends Club or Organization Meetings: More than 4 times per year    Marital Status:    Housing Stability: Low Risk     Unable to Pay for Housing in the Last Year: No    Number of Places Lived in the Last Year: 1    Unstable Housing in the Last Year: No

## 2022-06-13 ENCOUNTER — OFFICE VISIT (OUTPATIENT)
Dept: DERMATOLOGY | Facility: CLINIC | Age: 42
End: 2022-06-13
Payer: COMMERCIAL

## 2022-06-13 DIAGNOSIS — D22.9 NEVUS: Primary | ICD-10-CM

## 2022-06-13 DIAGNOSIS — L82.0 INFLAMED SEBORRHEIC KERATOSIS: ICD-10-CM

## 2022-06-13 DIAGNOSIS — L90.5 SCAR: ICD-10-CM

## 2022-06-13 DIAGNOSIS — Z86.018 HISTORY OF DYSPLASTIC NEVUS: ICD-10-CM

## 2022-06-13 DIAGNOSIS — L81.4 LENTIGO: ICD-10-CM

## 2022-06-13 PROCEDURE — 99999 PR PBB SHADOW E&M-EST. PATIENT-LVL III: ICD-10-PCS | Mod: PBBFAC,,, | Performed by: DERMATOLOGY

## 2022-06-13 PROCEDURE — 1160F PR REVIEW ALL MEDS BY PRESCRIBER/CLIN PHARMACIST DOCUMENTED: ICD-10-PCS | Mod: CPTII,S$GLB,, | Performed by: DERMATOLOGY

## 2022-06-13 PROCEDURE — 99213 PR OFFICE/OUTPT VISIT, EST, LEVL III, 20-29 MIN: ICD-10-PCS | Mod: 25,S$GLB,, | Performed by: DERMATOLOGY

## 2022-06-13 PROCEDURE — 1159F PR MEDICATION LIST DOCUMENTED IN MEDICAL RECORD: ICD-10-PCS | Mod: CPTII,S$GLB,, | Performed by: DERMATOLOGY

## 2022-06-13 PROCEDURE — 99999 PR PBB SHADOW E&M-EST. PATIENT-LVL III: CPT | Mod: PBBFAC,,, | Performed by: DERMATOLOGY

## 2022-06-13 PROCEDURE — 99213 OFFICE O/P EST LOW 20 MIN: CPT | Mod: 25,S$GLB,, | Performed by: DERMATOLOGY

## 2022-06-13 PROCEDURE — 17110 DESTRUCTION B9 LES UP TO 14: CPT | Mod: S$GLB,,, | Performed by: DERMATOLOGY

## 2022-06-13 PROCEDURE — 1159F MED LIST DOCD IN RCRD: CPT | Mod: CPTII,S$GLB,, | Performed by: DERMATOLOGY

## 2022-06-13 PROCEDURE — 17110 PR DESTRUCTION BENIGN LESIONS UP TO 14: ICD-10-PCS | Mod: S$GLB,,, | Performed by: DERMATOLOGY

## 2022-06-13 PROCEDURE — 1160F RVW MEDS BY RX/DR IN RCRD: CPT | Mod: CPTII,S$GLB,, | Performed by: DERMATOLOGY

## 2022-06-13 NOTE — PROGRESS NOTES
"  Subjective:       Patient ID:  Karolyn Campbell is a 41 y.o. female who presents for   Chief Complaint   Patient presents with    Lesion     FACE    Skin Check     tbse     Patient with new complaint of lesion(s)  Location: face  Duration: 6mos  Symptoms: recurring. scaling  Relieving factors/Previous treatments: sal acid pads    Patient is here today for a "mole" check.   Pt has a history of  severe sun exposure in the past.   Pt recalls several blistering sunburns in the past- yes  Pt has history of tanning bed use- yes  Pt has  had moles removed in the past- yes, pre cancerous per pt  Pt has history of melanoma in first degree relatives-  Pt is adopted so does not have family hx        Lesion        Review of Systems   Skin: Positive for daily sunscreen use and activity-related sunscreen use. Negative for tendency to form keloidal scars.   Hematologic/Lymphatic: Bruises/bleeds easily (bruise).        Objective:    Physical Exam   Constitutional: She appears well-developed and well-nourished. No distress.   Neurological: She is alert and oriented to person, place, and time. She is not disoriented.   Psychiatric: She has a normal mood and affect.   Skin:   Areas Examined (abnormalities noted in diagram):   Scalp / Hair Palpated and Inspected  Head / Face Inspection Performed  Neck Inspection Performed  Chest / Axilla Inspection Performed  Abdomen Inspection Performed  Genitals / Buttocks / Groin Inspection Performed  Back Inspection Performed  RUE Inspected  LUE Inspection Performed  RLE Inspected  LLE Inspection Performed  Nails and Digits Inspection Performed                       Diagram Legend     Erythematous scaling macule/papule c/w actinic keratosis       Vascular papule c/w angioma      Pigmented verrucoid papule/plaque c/w seborrheic keratosis      Yellow umbilicated papule c/w sebaceous hyperplasia      Irregularly shaped tan macule c/w lentigo     1-2 mm smooth white papules consistent with Milia      " Movable subcutaneous cyst with punctum c/w epidermal inclusion cyst      Subcutaneous movable cyst c/w pilar cyst      Firm pink to brown papule c/w dermatofibroma      Pedunculated fleshy papule(s) c/w skin tag(s)      Evenly pigmented macule c/w junctional nevus     Mildly variegated pigmented, slightly irregular-bordered macule c/w mildly atypical nevus      Flesh colored to evenly pigmented papule c/w intradermal nevus       Pink pearly papule/plaque c/w basal cell carcinoma      Erythematous hyperkeratotic cursted plaque c/w SCC      Surgical scar with no sign of skin cancer recurrence      Open and closed comedones      Inflammatory papules and pustules      Verrucoid papule consistent consistent with wart     Erythematous eczematous patches and plaques     Dystrophic onycholytic nail with subungual debris c/w onychomycosis     Umbilicated papule    Erythematous-base heme-crusted tan verrucoid plaque consistent with inflamed seborrheic keratosis     Erythematous Silvery Scaling Plaque c/w Psoriasis     See annotation      Assessment / Plan:        Nevus  Discussed ABCDE's of nevi.  Monitor for new mole or moles that are becoming bigger, darker, irritated, or developing irregular borders. Brochure provided. Instructed patient to observe lesion(s) for changes and follow up in clinic if changes are noted. Patient to monitor skin at home for new or changing lesions.     Lentigo  This is a benign hyperpigmented sun induced lesion. Recommend daily sun protection/avoidance and use of at least SPF 30, broad spectrum sunscreen (OTC drug) will reduce the number of new lesions. Treatment of these benign lesions are considered cosmetic.    The nature of sun-induced photo-aging and skin cancers is discussed.  Sun avoidance, protective clothing, and the use of 30-SPF sunscreens is advised. Observe closely for skin damage/changes, and call if such occurs.    Inflamed seborrheic keratosis  Cryosurgery procedure  note:    Verbal consent from the patient is obtained including, but not limited to, risk of hypopigmentation/hyperpigmentation, scar, recurrence of lesion. Liquid nitrogen cryosurgery is applied to 1 lesions to produce a freeze injury. The patient is aware that blisters may form and is instructed on wound care with gentle cleansing and use of vaseline ointment to keep moist until healed. The patient is supplied a handout on cryosurgery and is instructed to call if lesions do not completely resolve.    History of dysplastic nevus  Scar  Pt with history of non melanoma skin cancer  Total body skin examination performed today including at least 12 points as noted in physical examination. No suspicious lesions noted.Monitor for new or changing lesions as discussed such as pink scaly patches that are occasionally tender or not healing, 'pimples' that never go away, lesions that are not healing or bleeding.              Follow up in about 1 year (around 6/13/2023).

## 2022-06-13 NOTE — PATIENT INSTRUCTIONS
We would like to see you back in the clinic in 12 months.  You will be able to schedule this appointment by calling or by using your [facility] portal 3 months before this time. Because our schedule fills so quickly, please set a reminder in your phone or on your calendar to schedule 3 months before you are due to come in so that we can see you in a timely fashion.  You should also receive a reminder from us in the mail. This will help us ensure we can continue to provide excellent healthcare for you. Thank you.

## 2022-07-11 ENCOUNTER — OFFICE VISIT (OUTPATIENT)
Dept: OBSTETRICS AND GYNECOLOGY | Facility: CLINIC | Age: 42
End: 2022-07-11
Attending: OBSTETRICS & GYNECOLOGY
Payer: COMMERCIAL

## 2022-07-11 VITALS
SYSTOLIC BLOOD PRESSURE: 110 MMHG | HEIGHT: 62 IN | WEIGHT: 133.38 LBS | DIASTOLIC BLOOD PRESSURE: 76 MMHG | BODY MASS INDEX: 24.54 KG/M2

## 2022-07-11 DIAGNOSIS — Z11.51 ENCOUNTER FOR SCREENING FOR HUMAN PAPILLOMAVIRUS (HPV): ICD-10-CM

## 2022-07-11 DIAGNOSIS — Z12.4 ENCOUNTER FOR PAPANICOLAOU SMEAR FOR CERVICAL CANCER SCREENING: ICD-10-CM

## 2022-07-11 DIAGNOSIS — Z01.419 ENCOUNTER FOR GYNECOLOGICAL EXAMINATION: Primary | ICD-10-CM

## 2022-07-11 DIAGNOSIS — Z12.31 BREAST CANCER SCREENING BY MAMMOGRAM: ICD-10-CM

## 2022-07-11 PROCEDURE — 99396 PREV VISIT EST AGE 40-64: CPT | Mod: S$GLB,,, | Performed by: OBSTETRICS & GYNECOLOGY

## 2022-07-11 PROCEDURE — 1159F MED LIST DOCD IN RCRD: CPT | Mod: CPTII,S$GLB,, | Performed by: OBSTETRICS & GYNECOLOGY

## 2022-07-11 PROCEDURE — 3074F SYST BP LT 130 MM HG: CPT | Mod: CPTII,S$GLB,, | Performed by: OBSTETRICS & GYNECOLOGY

## 2022-07-11 PROCEDURE — 3078F DIAST BP <80 MM HG: CPT | Mod: CPTII,S$GLB,, | Performed by: OBSTETRICS & GYNECOLOGY

## 2022-07-11 PROCEDURE — 3008F PR BODY MASS INDEX (BMI) DOCUMENTED: ICD-10-PCS | Mod: CPTII,S$GLB,, | Performed by: OBSTETRICS & GYNECOLOGY

## 2022-07-11 PROCEDURE — 3078F PR MOST RECENT DIASTOLIC BLOOD PRESSURE < 80 MM HG: ICD-10-PCS | Mod: CPTII,S$GLB,, | Performed by: OBSTETRICS & GYNECOLOGY

## 2022-07-11 PROCEDURE — 99999 PR PBB SHADOW E&M-EST. PATIENT-LVL III: CPT | Mod: PBBFAC,,, | Performed by: OBSTETRICS & GYNECOLOGY

## 2022-07-11 PROCEDURE — 99396 PR PREVENTIVE VISIT,EST,40-64: ICD-10-PCS | Mod: S$GLB,,, | Performed by: OBSTETRICS & GYNECOLOGY

## 2022-07-11 PROCEDURE — 99999 PR PBB SHADOW E&M-EST. PATIENT-LVL III: ICD-10-PCS | Mod: PBBFAC,,, | Performed by: OBSTETRICS & GYNECOLOGY

## 2022-07-11 PROCEDURE — 3074F PR MOST RECENT SYSTOLIC BLOOD PRESSURE < 130 MM HG: ICD-10-PCS | Mod: CPTII,S$GLB,, | Performed by: OBSTETRICS & GYNECOLOGY

## 2022-07-11 PROCEDURE — 1159F PR MEDICATION LIST DOCUMENTED IN MEDICAL RECORD: ICD-10-PCS | Mod: CPTII,S$GLB,, | Performed by: OBSTETRICS & GYNECOLOGY

## 2022-07-11 PROCEDURE — 88175 CYTOPATH C/V AUTO FLUID REDO: CPT | Performed by: OBSTETRICS & GYNECOLOGY

## 2022-07-11 PROCEDURE — 87624 HPV HI-RISK TYP POOLED RSLT: CPT | Performed by: OBSTETRICS & GYNECOLOGY

## 2022-07-11 PROCEDURE — 3008F BODY MASS INDEX DOCD: CPT | Mod: CPTII,S$GLB,, | Performed by: OBSTETRICS & GYNECOLOGY

## 2022-07-11 NOTE — PROGRESS NOTES
Chief Complaint: well woman exam  Annual Exam (Last pap 2019 (nml)/Last hpv 2019 (neg)/Last mammogram 11/10/2021 birads: 1 )    She is established    Karolyn Campbell is a 41 y.o. female  presents for a well woman exam. No c/o Doing well   Contraception vasectomy    ROS:  No abdominal pain. No vaginal discharge  No breast pain or masses, No rectal bleeding       Cycles: regular and monthly  LMP: Patient's last menstrual period was 2022 (exact date).  Last pap 2019 (nml)/Last hpv 2019 (neg)/  Last mammogram 11/10/2021 birads: 1       Past Medical History:   Diagnosis Date    Abnormal Pap smear of cervix     prior to being a pt     ADHD (attention deficit hyperactivity disorder)     Diagnosed by psychologist in [place] in the 10th grade and was on Ritalin from 10th to 12 grade and got off med; she then did not start back on ADHD medication until 2018 with Dr. Linda Pineda here in office    Allergy     Anxiety     Anxiety     Fever blister     HPV (human papilloma virus) infection        Past Surgical History:   Procedure Laterality Date    AUGMENTATION OF BREAST      BREAST SURGERY      breast augmentation in ; and then in  had an implant deflate that needed repair     SECTION      replacement of  breast implant       due to rupture     RHINOPLASTY         OB History    Para Term  AB Living   5 5 3 0 0 2   SAB IAB Ectopic Multiple Live Births   0 0 0 0 2      # Outcome Date GA Lbr Germain/2nd Weight Sex Delivery Anes PTL Lv   5 Para 2014    F CS-LTranv   MAX   4 Para 2010    M CS-LTranv   MAX   3 Term            2 Term            1 Term                Family History   Adopted: Yes   Problem Relation Age of Onset    No Known Problems Daughter     ADD / ADHD Son     Strabismus Son        Social History     Tobacco Use    Smoking status: Former Smoker     Packs/day: 0.25     Years: 5.00     Pack years: 1.25      "Types: Cigarettes     Start date: 2003     Quit date: 2009     Years since quittin.8    Smokeless tobacco: Never Used   Substance Use Topics    Alcohol use: Yes     Alcohol/week: 0.0 - 1.0 standard drinks     Comment: once a month - 1 to 2 drinks per occasion    Drug use: No       Physical Exam:  /76 (BP Location: Left arm, Patient Position: Sitting)   Ht 5' 2" (1.575 m)   Wt 60.5 kg (133 lb 6.1 oz)   LMP 2022 (Exact Date)   BMI 24.40 kg/m²     APPEARANCE: Well nourished, well developed, in no acute distress.  AFFECT: WNL, alert and oriented x 3  SKIN: No acne or hirsutism  BREASTS: Symmetrical, no skin changes.                      No nipple discharge. Implants present   No palpable masses bilaterally  NODES: No inguinal nor axillary LAD  ABDOMEN: soft Non tender Non distended No masses  PELVIC:   Normal external genitalia without lesions.  Normal hair distribution.   Adequate perineal body, normal urethral meatus.   No signif cystocele or rectocele.  Vagina moist and well rugated without lesions or discharge.    Cervix pink, without lesions, discharge or tenderness.     PAP performed   Bimanual exam shows uterus to be normal size, regular, mobile and nontender.    Adnexa without masses or tenderness.    EXTREMITIES: No edema.        ASSESSMENT AND PLAN  Karolyn was seen today for annual exam.    Diagnoses and all orders for this visit:    Encounter for gynecological examination   Pap today   MMG in NOV   Vasectomy   Cycles reg and monthly   Rec Vit D    Breast cancer screening by mammogram  -     Mammo Digital Screening Bilat w/ Mark; Future    Encounter for Papanicolaou smear for cervical cancer screening  -     Liquid-Based Pap Smear, Screening    Encounter for screening for human papillomavirus (HPV)  -     HPV High Risk Genotypes, PCR          No follow-ups on file.     Patient was counseled today on A.C.S. Pap guidelines and recommendations for yearly pelvic exams, mammograms and " monthly self breast exams; to see her PCP for other health maintenance.     Patient encouraged to register for portal and results will be sent via portal.       Health Maintenance   Topic Date Due    Hepatitis C Screening  07/12/2022 (Originally 1980)    Mammogram  11/10/2022    TETANUS VACCINE  07/15/2031    Lipid Panel  Completed

## 2022-07-15 LAB
FINAL PATHOLOGIC DIAGNOSIS: NORMAL
HPV HR 12 DNA SPEC QL NAA+PROBE: NEGATIVE
HPV16 AG SPEC QL: NEGATIVE
HPV18 DNA SPEC QL NAA+PROBE: NEGATIVE
Lab: NORMAL

## 2022-07-18 ENCOUNTER — OFFICE VISIT (OUTPATIENT)
Dept: FAMILY MEDICINE | Facility: CLINIC | Age: 42
End: 2022-07-18
Payer: COMMERCIAL

## 2022-07-18 VITALS
BODY MASS INDEX: 24.73 KG/M2 | HEART RATE: 76 BPM | OXYGEN SATURATION: 99 % | HEIGHT: 62 IN | DIASTOLIC BLOOD PRESSURE: 86 MMHG | WEIGHT: 134.38 LBS | TEMPERATURE: 99 F | SYSTOLIC BLOOD PRESSURE: 120 MMHG

## 2022-07-18 DIAGNOSIS — F43.0 STRESS REACTION CAUSING MIXED DISTURBANCE OF EMOTION AND CONDUCT: ICD-10-CM

## 2022-07-18 DIAGNOSIS — B00.1 RECURRENT COLD SORES: ICD-10-CM

## 2022-07-18 DIAGNOSIS — Z00.00 ANNUAL PHYSICAL EXAM: Primary | ICD-10-CM

## 2022-07-18 DIAGNOSIS — F90.2 ATTENTION DEFICIT HYPERACTIVITY DISORDER (ADHD), COMBINED TYPE: ICD-10-CM

## 2022-07-18 PROCEDURE — 1159F MED LIST DOCD IN RCRD: CPT | Mod: CPTII,S$GLB,, | Performed by: NURSE PRACTITIONER

## 2022-07-18 PROCEDURE — 99999 PR PBB SHADOW E&M-EST. PATIENT-LVL IV: CPT | Mod: PBBFAC,,, | Performed by: NURSE PRACTITIONER

## 2022-07-18 PROCEDURE — 3079F DIAST BP 80-89 MM HG: CPT | Mod: CPTII,S$GLB,, | Performed by: NURSE PRACTITIONER

## 2022-07-18 PROCEDURE — 1160F PR REVIEW ALL MEDS BY PRESCRIBER/CLIN PHARMACIST DOCUMENTED: ICD-10-PCS | Mod: CPTII,S$GLB,, | Performed by: NURSE PRACTITIONER

## 2022-07-18 PROCEDURE — 99396 PR PREVENTIVE VISIT,EST,40-64: ICD-10-PCS | Mod: S$GLB,,, | Performed by: NURSE PRACTITIONER

## 2022-07-18 PROCEDURE — 1160F RVW MEDS BY RX/DR IN RCRD: CPT | Mod: CPTII,S$GLB,, | Performed by: NURSE PRACTITIONER

## 2022-07-18 PROCEDURE — 99999 PR PBB SHADOW E&M-EST. PATIENT-LVL IV: ICD-10-PCS | Mod: PBBFAC,,, | Performed by: NURSE PRACTITIONER

## 2022-07-18 PROCEDURE — 99396 PREV VISIT EST AGE 40-64: CPT | Mod: S$GLB,,, | Performed by: NURSE PRACTITIONER

## 2022-07-18 PROCEDURE — 1159F PR MEDICATION LIST DOCUMENTED IN MEDICAL RECORD: ICD-10-PCS | Mod: CPTII,S$GLB,, | Performed by: NURSE PRACTITIONER

## 2022-07-18 PROCEDURE — 3074F SYST BP LT 130 MM HG: CPT | Mod: CPTII,S$GLB,, | Performed by: NURSE PRACTITIONER

## 2022-07-18 PROCEDURE — 3008F PR BODY MASS INDEX (BMI) DOCUMENTED: ICD-10-PCS | Mod: CPTII,S$GLB,, | Performed by: NURSE PRACTITIONER

## 2022-07-18 PROCEDURE — 3079F PR MOST RECENT DIASTOLIC BLOOD PRESSURE 80-89 MM HG: ICD-10-PCS | Mod: CPTII,S$GLB,, | Performed by: NURSE PRACTITIONER

## 2022-07-18 PROCEDURE — 3008F BODY MASS INDEX DOCD: CPT | Mod: CPTII,S$GLB,, | Performed by: NURSE PRACTITIONER

## 2022-07-18 PROCEDURE — 3074F PR MOST RECENT SYSTOLIC BLOOD PRESSURE < 130 MM HG: ICD-10-PCS | Mod: CPTII,S$GLB,, | Performed by: NURSE PRACTITIONER

## 2022-07-18 RX ORDER — METHYLPHENIDATE HYDROCHLORIDE 20 MG/1
20 TABLET ORAL 2 TIMES DAILY
Qty: 60 TABLET | Refills: 0 | Status: SHIPPED | OUTPATIENT
Start: 2022-07-18 | End: 2022-10-17 | Stop reason: SDUPTHER

## 2022-07-18 RX ORDER — METHYLPHENIDATE HYDROCHLORIDE 20 MG/1
20 TABLET ORAL 2 TIMES DAILY
Qty: 60 TABLET | Refills: 0 | Status: SHIPPED | OUTPATIENT
Start: 2022-08-17 | End: 2022-10-17 | Stop reason: SDUPTHER

## 2022-07-18 RX ORDER — METHYLPHENIDATE HYDROCHLORIDE 20 MG/1
20 TABLET ORAL 2 TIMES DAILY
Qty: 60 TABLET | Refills: 0 | Status: SHIPPED | OUTPATIENT
Start: 2022-09-16 | End: 2022-10-17 | Stop reason: SDUPTHER

## 2022-07-18 NOTE — PROGRESS NOTES
Subjective:       Patient ID: Karolyn Campbell is a 41 y.o. female.    Chief Complaint: Annual Exam and Medication Refill    HPI    Patient is a 41-year-old white female with ADHD, stress reaction with anxiety, history of HPV, recurrent cold sores,  and chronic allergies that is here today for ANNUAL physical exam with fasting lab results.     ADHD  1st evaluated for ADHD by psychologist in [place] in the 10th grade.    Diagnosed with ADHD predominantly inattentive and compulsive traits - NO access to records  Treated by Dr. Linda Pineda on 05/28/2018 - started Adderall XR 10 mg daily but not tolerated   Changed to Ritalin 10 mg twice daily on 07/13/2018.   Since February 2019, patient has been on the same dose of Ritalin 20 mg twice daily.   Able to focus and complete tasks without side effects.  She reports no problems with sleep.   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     Stress Reaction  Patient diagnosed in July 2021 with acute stress reaction with increased anxiety and started on Zoloft 25 mg daily.    Increased the Zoloft to 50 mg daily May 2022.   Increased dose is working well without side effects.      Recurrent cold sores  takes Valtrex as needed.    Wellness Labs:  CBC WNL  CMP WNL  Cholesterol levels okay  TSH WNL    Health Maintenance:  UTD    Component      Latest Ref Rng & Units 7/11/2022 7/12/2021 7/8/2020 5/16/2019   WBC      3.90 - 12.70 K/uL 5.13 5.05 5.24 4.20   RBC      4.00 - 5.40 M/uL 4.76 3.84 (L) 4.65 4.91   Hemoglobin      12.0 - 16.0 g/dL 13.6 11.1 (L) 13.7 14.5   Hematocrit      37.0 - 48.5 % 42.8 34.8 (L) 43.1 44.1   MCV      82 - 98 fL 90 91 93 90   MCH      27.0 - 31.0 pg 28.6 28.9 29.5 29.5   MCHC      32.0 - 36.0 g/dL 31.8 (L) 31.9 (L) 31.8 (L) 32.9   RDW      11.5 - 14.5 % 13.9 12.7 13.2 12.4   Platelets      150 - 450 K/uL 294 330 330 324   MPV      9.2 - 12.9 fL 10.5 10.5 10.7 10.3   Immature Granulocytes      0.0 - 0.5 % 0.2 0.2 0.2    Gran #  (ANC)      1.8 - 7.7 K/uL 2.6 2.1 2.8 2.0   Immature Grans (Abs)      0.00 - 0.04 K/uL 0.01 0.01 0.01    Lymph #      1.0 - 4.8 K/uL 1.8 2.2 1.8 1.7   Mono #      0.3 - 1.0 K/uL 0.4 0.5 0.5 0.4   Eos #      0.0 - 0.5 K/uL 0.2 0.2 0.1 0.1   Baso #      0.00 - 0.20 K/uL 0.04 0.05 0.04 0.04   nRBC      0 /100 WBC 0 0 0    Gran %      38.0 - 73.0 % 51.4 41.1 53.2 46.8   Lymph %      18.0 - 48.0 % 34.9 43.8 34.5 41.2   Mono %      4.0 - 15.0 % 8.4 10.7 8.6 9.3   Eosinophil %      0.0 - 8.0 % 4.3 3.2 2.7 1.7   Basophil %      0.0 - 1.9 % 0.8 1.0 0.8 1.0   Differential Method       Automated Automated Automated Automated   Sodium      136 - 145 mmol/L 141 140 144 144   Potassium      3.5 - 5.1 mmol/L 4.8 4.2 4.0 4.3   Chloride      95 - 110 mmol/L 106 107 109 105   CO2      23 - 29 mmol/L 25 26 26 27   Glucose      70 - 110 mg/dL 108 100 103 100   BUN      7 - 17 mg/dL 19 (H) 19 (H) 18 (H) 24 (H)   Creatinine      0.50 - 1.40 mg/dL 0.64 0.54 0.57 0.58   Calcium      8.7 - 10.5 mg/dL 9.0 9.4 9.1 10.1   PROTEIN TOTAL      6.0 - 8.4 g/dL 8.2 7.4 7.5 8.4   Albumin      3.5 - 5.2 g/dL 4.9 4.4 4.6 4.9   BILIRUBIN TOTAL      0.1 - 1.0 mg/dL 0.5 0.1 0.3 0.7   Alkaline Phosphatase      38 - 126 U/L 65 51 56 47   AST      15 - 46 U/L 31 27 35 22   ALT      10 - 44 U/L 21 16 17 14   Anion Gap      8 - 16 mmol/L 10 7 (L) 9 12   eGFR if African American      >60 mL/min/1.73 m:2 >60.0 >60.0 >60.0 >60.0   eGFR if non African American      >60 mL/min/1.73 m:2 >60.0 >60.0 >60.0 >60.0   Cholesterol      120 - 199 mg/dL 205 (H) 189 187 204 (H)   Triglycerides      30 - 150 mg/dL 67 81 57 65   HDL      40 - 75 mg/dL 82 (H) 76 (H) 78 (H) 69   LDL Cholesterol External      63.0 - 159.0 mg/dL 109.6 96.8 97.6 122.0   HDL/Cholesterol Ratio      20.0 - 50.0 % 40.0 40.2 41.7 33.8   Total Cholesterol/HDL Ratio      2.0 - 5.0 2.5 2.5 2.4 3.0   Non-HDL Cholesterol      mg/dL 123 113 109 135   TSH      0.400 - 4.000 uIU/mL 3.550 3.460 3.110 2.080  "  Hepatitis C Ab      Negative Negative        Review of Systems   Constitutional: Negative for appetite change, chills, fatigue, fever and unexpected weight change.   HENT: Negative for congestion, ear pain, mouth sores, nosebleeds, postnasal drip, rhinorrhea, sinus pressure, sneezing, sore throat, trouble swallowing and voice change.    Eyes: Negative for photophobia, pain, discharge, redness, itching and visual disturbance.   Respiratory: Negative for cough, chest tightness and shortness of breath.    Cardiovascular: Negative for chest pain, palpitations and leg swelling.   Gastrointestinal: Negative for abdominal pain, blood in stool, constipation, diarrhea, nausea and vomiting.   Genitourinary: Negative for dysuria, frequency, hematuria and urgency.   Musculoskeletal: Negative for arthralgias, back pain, joint swelling and myalgias.   Skin: Negative for color change and rash.   Allergic/Immunologic: Negative for immunocompromised state.   Neurological: Negative for dizziness, seizures, syncope, weakness and headaches.   Hematological: Negative for adenopathy. Does not bruise/bleed easily.   Psychiatric/Behavioral: Negative for agitation, dysphoric mood, sleep disturbance and suicidal ideas. The patient is not nervous/anxious.          Objective:     Vitals:    07/18/22 1537   BP: 120/86   BP Location: Left arm   Patient Position: Sitting   BP Method: Large (Manual)   Pulse: 76   Temp: 98.6 °F (37 °C)   TempSrc: Temporal   SpO2: 99%   Weight: 61 kg (134 lb 5.9 oz)   Height: 5' 2" (1.575 m)          Physical Exam  Constitutional:       General: She is not in acute distress.     Appearance: Normal appearance. She is well-developed and normal weight. She is not ill-appearing, toxic-appearing or diaphoretic.      Comments: Body mass index is 24.58 kg/m².     HENT:      Head: Normocephalic and atraumatic.      Right Ear: Tympanic membrane and external ear normal. There is no impacted cerumen.      Left Ear: Tympanic " membrane and external ear normal. There is no impacted cerumen.   Eyes:      General: No scleral icterus.        Right eye: No discharge.         Left eye: No discharge.      Extraocular Movements: Extraocular movements intact.      Conjunctiva/sclera: Conjunctivae normal.      Pupils: Pupils are equal, round, and reactive to light.   Neck:      Thyroid: No thyromegaly.      Trachea: No tracheal deviation.   Cardiovascular:      Rate and Rhythm: Normal rate and regular rhythm.      Heart sounds: Normal heart sounds. No murmur heard.  Pulmonary:      Effort: Pulmonary effort is normal. No respiratory distress.      Breath sounds: Normal breath sounds. No stridor. No wheezing, rhonchi or rales.   Abdominal:      General: There is no distension.      Palpations: Abdomen is soft. There is no mass.      Tenderness: There is no abdominal tenderness. There is no guarding or rebound.      Hernia: No hernia is present.   Musculoskeletal:         General: No swelling or deformity. Normal range of motion.      Cervical back: Normal range of motion and neck supple.      Right lower leg: No edema.      Left lower leg: No edema.   Lymphadenopathy:      Cervical: No cervical adenopathy.   Skin:     General: Skin is warm and dry.      Findings: No rash.   Neurological:      Mental Status: She is alert and oriented to person, place, and time.      Cranial Nerves: No cranial nerve deficit.      Coordination: Coordination normal.   Psychiatric:         Mood and Affect: Mood normal.         Behavior: Behavior normal.         Thought Content: Thought content normal.         Judgment: Judgment normal.           Assessment:         ICD-10-CM ICD-9-CM   1. Annual physical exam  Z00.00 V70.0   2. Attention deficit hyperactivity disorder (ADHD), combined type  F90.2 314.01   3. 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 Recurrent cold sores  B00.1 054.9       Plan:       Annual physical exam  Health  Maintenance Summary     Full History      Expand All  Collapse All    Influenza Vaccine  (1)  Next due on 9/1/2022 08/25/2021  Imm Admin: Influenza    09/28/2020  Imm Admin: Influenza - Quadrivalent - PF *Preferred* (6 months and older)    11/05/2019  SmartData: WORKFLOW - HEALTHY PLANET - EXTERNAL DATA - EXTERNAL PROCEDURE DATE - INFLUENZA    09/27/2019  Imm Admin: Influenza - Quadrivalent - PF *Preferred* (6 months and older)    09/23/2014  Imm Admin: Influenza    View More History     Scheduled - Mammogram  (Yearly)  Scheduled for 11/15/2022  11/10/2021  Mammo Digital Screening Bilat w/ Mark    10/06/2020  Mammo Digital Screening Bilat w/ Mark    09/03/2019  Mammo Digital Screening Bilat w/ Mark    06/20/2018  Mammo Previous    06/15/2018  Mammo Digital Screening Bilat with Tomosynthesis CAD      Cervical Cancer Screening  (HPV/Cotest - Every 3 Years)  Next due on 7/11/2025 07/11/2022  Multiple components of HPV High Risk Genotypes, PCR    07/11/2022  Liquid-Based Pap Smear, Screening    04/29/2019  Multiple components of HPV High Risk Genotypes, PCR    04/29/2019  Liquid-based pap smear, screening    01/26/2017  HPV DNA probe, amplified    View More History     TETANUS VACCINE  (Every 10 Years)  Next due on 7/15/2031  07/15/2021  Imm Admin: Tdap    06/19/2002  Imm Admin: Td (Adult), Unspecified Formulation      HIV Screening  Completed  07/08/2020  HIV 1/2 Ag/Ab (4th Gen)      COVID-19 Vaccine  (Series Information)  Completed  11/30/2021  Imm Admin: COVID-19, MRNA, LN-S, PF (Pfizer) (Purple Cap)    03/17/2021  Imm Admin: COVID-19, MRNA, LN-S, PF (Pfizer) (Purple Cap)    02/24/2021  Imm Admin: COVID-19, MRNA, LN-S, PF (Pfizer) (Purple Cap)      Hepatitis C Screening  Completed  07/11/2022  Hepatitis C Ab component of Hepatitis C Antibody      Lipid Panel  Completed  07/11/2022  Lipid Panel    08/25/2021  POCT Lipid Profile w/ Glucose    07/12/2021  Lipid Panel    09/28/2020  POCT Lipid Profile w/ Glucose     07/08/2020  Lipid Panel    View More History     Pneumococcal Vaccines (Age 0-64)  (Series Information)  Aged Out  No completion history exists for this topic.           Attention deficit hyperactivity disorder (ADHD), combined type  Continue current medication(s).  Follow up in 3 months.  -     methylphenidate HCl (RITALIN) 20 MG tablet; Take 1 tablet (20 mg total) by mouth 2 (two) times daily.  Dispense: 60 tablet; Refill: 0  -     methylphenidate HCl (RITALIN) 20 MG tablet; Take 1 tablet (20 mg total) by mouth 2 (two) times daily.  Dispense: 60 tablet; Refill: 0  -     methylphenidate HCl (RITALIN) 20 MG tablet; Take 1 tablet (20 mg total) by mouth 2 (two) times daily.  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  Follow up in 3 months.    Recurrent cold sores  Valtrex prn      Follow up in about 3 months (around 10/16/2022) for ADHD follow up.     Patient's Medications   New Prescriptions    No medications on file   Previous Medications    SERTRALINE (ZOLOFT) 50 MG TABLET    Take 1 tablet (50 mg total) by mouth once daily.    VALACYCLOVIR (VALTREX) 1000 MG TABLET    Take 2 tablets at onset of cold sore and repeat in 12 hours for 1 additional dose as need for cold sore outbreak   Modified Medications    Modified Medication Previous Medication    METHYLPHENIDATE HCL (RITALIN) 20 MG TABLET methylphenidate HCl (RITALIN) 20 MG tablet       Take 1 tablet (20 mg total) by mouth 2 (two) times daily.    Take 1 tablet (20 mg total) by mouth 2 (two) times daily.    METHYLPHENIDATE HCL (RITALIN) 20 MG TABLET methylphenidate HCl (RITALIN) 20 MG tablet       Take 1 tablet (20 mg total) by mouth 2 (two) times daily.    Take 1 tablet (20 mg total) by mouth 2 (two) times daily.    METHYLPHENIDATE HCL (RITALIN) 20 MG TABLET methylphenidate HCl (RITALIN) 20 MG tablet       Take 1 tablet (20 mg total) by mouth 2 (two) times daily.    Take 1  tablet (20 mg total) by mouth 2 (two) times daily.   Discontinued Medications    No medications on file       Past Medical History:   Diagnosis Date    Abnormal Pap smear of cervix     prior to being a pt     ADHD (attention deficit hyperactivity disorder)     Diagnosed by psychologist in [place] in the 10th grade and was on Ritalin from 10th to 12 grade and got off med; she then did not start back on ADHD medication until 2018 with Dr. Linda Pineda here in office    Allergy     Anxiety     Anxiety     Fever blister     HPV (human papilloma virus) infection        Past Surgical History:   Procedure Laterality Date    AUGMENTATION OF BREAST      BREAST SURGERY      breast augmentation in ; and then in  had an implant deflate that needed repair     SECTION      replacement of  breast implant       due to rupture     RHINOPLASTY         Family History   Adopted: Yes   Problem Relation Age of Onset    No Known Problems Daughter     ADD / ADHD Son     Strabismus Son        Social History     Socioeconomic History    Marital status:    Occupational History    Occupation: works curriculum advisor   Tobacco Use    Smoking status: Former Smoker     Packs/day: 0.25     Years: 5.00     Pack years: 1.25     Types: Cigarettes     Start date: 2003     Quit date: 2009     Years since quittin.8    Smokeless tobacco: Never Used   Substance and Sexual Activity    Alcohol use: Yes     Alcohol/week: 0.0 - 1.0 standard drinks     Comment: once a month - 1 to 2 drinks per occasion    Drug use: No    Sexual activity: Yes     Partners: Male     Birth control/protection: Partner-Vasectomy   Other Topics Concern    Are you pregnant or think you may be? No    Breast-feeding No   Social History Narrative    .  2 children     Social Determinants of Health     Financial Resource Strain: Low Risk     Difficulty of Paying Living Expenses: Not hard  at all   Food Insecurity: No Food Insecurity    Worried About Running Out of Food in the Last Year: Never true    Ran Out of Food in the Last Year: Never true   Transportation Needs: No Transportation Needs    Lack of Transportation (Medical): No    Lack of Transportation (Non-Medical): No   Physical Activity: Insufficiently Active    Days of Exercise per Week: 2 days    Minutes of Exercise per Session: 30 min   Stress: Stress Concern Present    Feeling of Stress : To some extent   Social Connections: Unknown    Frequency of Communication with Friends and Family: More than three times a week    Frequency of Social Gatherings with Friends and Family: Twice a week    Active Member of Clubs or Organizations: Yes    Attends Club or Organization Meetings: More than 4 times per year    Marital Status:    Housing Stability: Low Risk     Unable to Pay for Housing in the Last Year: No    Number of Places Lived in the Last Year: 1    Unstable Housing in the Last Year: No

## 2022-07-29 ENCOUNTER — OFFICE VISIT (OUTPATIENT)
Dept: OPHTHALMOLOGY | Facility: CLINIC | Age: 42
End: 2022-07-29
Payer: COMMERCIAL

## 2022-07-29 DIAGNOSIS — H40.1134 PRIMARY OPEN ANGLE GLAUCOMA (POAG) OF BOTH EYES, INDETERMINATE STAGE: Primary | ICD-10-CM

## 2022-07-29 PROCEDURE — 92020 PR SPECIAL EYE EVAL,GONIOSCOPY: ICD-10-PCS | Mod: S$GLB,,, | Performed by: STUDENT IN AN ORGANIZED HEALTH CARE EDUCATION/TRAINING PROGRAM

## 2022-07-29 PROCEDURE — 99999 PR PBB SHADOW E&M-EST. PATIENT-LVL II: CPT | Mod: PBBFAC,,, | Performed by: STUDENT IN AN ORGANIZED HEALTH CARE EDUCATION/TRAINING PROGRAM

## 2022-07-29 PROCEDURE — 1160F PR REVIEW ALL MEDS BY PRESCRIBER/CLIN PHARMACIST DOCUMENTED: ICD-10-PCS | Mod: CPTII,S$GLB,, | Performed by: STUDENT IN AN ORGANIZED HEALTH CARE EDUCATION/TRAINING PROGRAM

## 2022-07-29 PROCEDURE — 1160F RVW MEDS BY RX/DR IN RCRD: CPT | Mod: CPTII,S$GLB,, | Performed by: STUDENT IN AN ORGANIZED HEALTH CARE EDUCATION/TRAINING PROGRAM

## 2022-07-29 PROCEDURE — 92020 GONIOSCOPY: CPT | Mod: S$GLB,,, | Performed by: STUDENT IN AN ORGANIZED HEALTH CARE EDUCATION/TRAINING PROGRAM

## 2022-07-29 PROCEDURE — 92133 POSTERIOR SEGMENT OCT OPTIC NERVE(OCULAR COHERENCE TOMOGRAPHY) - OU - BOTH EYES: ICD-10-PCS | Mod: S$GLB,,, | Performed by: STUDENT IN AN ORGANIZED HEALTH CARE EDUCATION/TRAINING PROGRAM

## 2022-07-29 PROCEDURE — 99999 PR PBB SHADOW E&M-EST. PATIENT-LVL II: ICD-10-PCS | Mod: PBBFAC,,, | Performed by: STUDENT IN AN ORGANIZED HEALTH CARE EDUCATION/TRAINING PROGRAM

## 2022-07-29 PROCEDURE — 1159F PR MEDICATION LIST DOCUMENTED IN MEDICAL RECORD: ICD-10-PCS | Mod: CPTII,S$GLB,, | Performed by: STUDENT IN AN ORGANIZED HEALTH CARE EDUCATION/TRAINING PROGRAM

## 2022-07-29 PROCEDURE — 99204 OFFICE O/P NEW MOD 45 MIN: CPT | Mod: S$GLB,,, | Performed by: STUDENT IN AN ORGANIZED HEALTH CARE EDUCATION/TRAINING PROGRAM

## 2022-07-29 PROCEDURE — 92133 CPTRZD OPH DX IMG PST SGM ON: CPT | Mod: S$GLB,,, | Performed by: STUDENT IN AN ORGANIZED HEALTH CARE EDUCATION/TRAINING PROGRAM

## 2022-07-29 PROCEDURE — 1159F MED LIST DOCD IN RCRD: CPT | Mod: CPTII,S$GLB,, | Performed by: STUDENT IN AN ORGANIZED HEALTH CARE EDUCATION/TRAINING PROGRAM

## 2022-07-29 PROCEDURE — 99204 PR OFFICE/OUTPT VISIT, NEW, LEVL IV, 45-59 MIN: ICD-10-PCS | Mod: S$GLB,,, | Performed by: STUDENT IN AN ORGANIZED HEALTH CARE EDUCATION/TRAINING PROGRAM

## 2022-07-29 RX ORDER — TIMOLOL MALEATE 2.5 MG/ML
1 SOLUTION/ DROPS OPHTHALMIC DAILY
Qty: 5 ML | Refills: 4 | Status: SHIPPED | OUTPATIENT
Start: 2022-07-29 | End: 2022-10-17

## 2022-07-29 NOTE — PROGRESS NOTES
HPI     Pt in today with complaints of blurred vision in her right eye. Pt states   she poked herself in the eye about 2 weeks ago and since then she has   noticed her vision has changed. Pt denies any pain at this time, but   states it was hard for her to open her eye right after poking it.     Last edited by Ailyn Raman on 7/29/2022  3:51 PM. (History)            Assessment /Plan     For exam results, see Encounter Report.    Primary open angle glaucoma (POAG) of both eyes, indeterminate stage  -   IOP elevated with risk of irreversible visual loss. Additional treatment required.  Discussed options, risks, and benefits of additional medication, SLT laser, or incisional glaucoma surgery.     IOP goal: Mid teens    Start  Timolol QAM OU    Reviewed importance of continued compliance with treatment and follow up.        Return to clinic in 2 week IOP check   Return to clinic in 1-2M for HVF 24-2, IOP, PACH, MRx    *Patient lives near [facility], [facility] and establishing care out there for her

## 2022-08-11 ENCOUNTER — OFFICE VISIT (OUTPATIENT)
Dept: OPTOMETRY | Facility: CLINIC | Age: 42
End: 2022-08-11
Payer: COMMERCIAL

## 2022-08-11 DIAGNOSIS — H52.4 MYOPIA OF BOTH EYES WITH REGULAR ASTIGMATISM AND PRESBYOPIA: ICD-10-CM

## 2022-08-11 DIAGNOSIS — H40.013 OAG (OPEN ANGLE GLAUCOMA) SUSPECT, LOW RISK, BILATERAL: ICD-10-CM

## 2022-08-11 DIAGNOSIS — Z46.0 FITTING AND ADJUSTMENT OF SPECTACLES AND CONTACT LENSES: Primary | ICD-10-CM

## 2022-08-11 DIAGNOSIS — Z01.00 COMPLETE EYE EXAM, ENCOUNTER FOR: ICD-10-CM

## 2022-08-11 DIAGNOSIS — H47.393 OPTIC NERVE CUPPING, BILATERAL: ICD-10-CM

## 2022-08-11 DIAGNOSIS — H52.223 MYOPIA OF BOTH EYES WITH REGULAR ASTIGMATISM AND PRESBYOPIA: ICD-10-CM

## 2022-08-11 DIAGNOSIS — H52.13 MYOPIA OF BOTH EYES WITH REGULAR ASTIGMATISM AND PRESBYOPIA: ICD-10-CM

## 2022-08-11 PROCEDURE — 92310 PR CONTACT LENS FITTING (NO CHANGE): ICD-10-PCS | Mod: CSM,S$GLB,, | Performed by: OPTOMETRIST

## 2022-08-11 PROCEDURE — 92015 PR REFRACTION: ICD-10-PCS | Mod: S$GLB,,, | Performed by: OPTOMETRIST

## 2022-08-11 PROCEDURE — 92004 PR EYE EXAM, NEW PATIENT,COMPREHESV: ICD-10-PCS | Mod: S$GLB,,, | Performed by: OPTOMETRIST

## 2022-08-11 PROCEDURE — 76514 ECHO EXAM OF EYE THICKNESS: CPT | Mod: S$GLB,,, | Performed by: OPTOMETRIST

## 2022-08-11 PROCEDURE — 92133 PR COMPUTERIZED OPHTHALMIC IMAGING OPTIC NERVE: ICD-10-PCS | Mod: S$GLB,,, | Performed by: OPTOMETRIST

## 2022-08-11 PROCEDURE — 1159F MED LIST DOCD IN RCRD: CPT | Mod: CPTII,S$GLB,, | Performed by: OPTOMETRIST

## 2022-08-11 PROCEDURE — 76514 PR  US, EYE, FOR CORNEAL THICKNESS: ICD-10-PCS | Mod: S$GLB,,, | Performed by: OPTOMETRIST

## 2022-08-11 PROCEDURE — 99999 PR PBB SHADOW E&M-EST. PATIENT-LVL III: CPT | Mod: PBBFAC,,, | Performed by: OPTOMETRIST

## 2022-08-11 PROCEDURE — 92015 DETERMINE REFRACTIVE STATE: CPT | Mod: S$GLB,,, | Performed by: OPTOMETRIST

## 2022-08-11 PROCEDURE — 1159F PR MEDICATION LIST DOCUMENTED IN MEDICAL RECORD: ICD-10-PCS | Mod: CPTII,S$GLB,, | Performed by: OPTOMETRIST

## 2022-08-11 PROCEDURE — 99999 PR PBB SHADOW E&M-EST. PATIENT-LVL III: ICD-10-PCS | Mod: PBBFAC,,, | Performed by: OPTOMETRIST

## 2022-08-11 PROCEDURE — 92310 CONTACT LENS FITTING OU: CPT | Mod: CSM,S$GLB,, | Performed by: OPTOMETRIST

## 2022-08-11 PROCEDURE — 92133 CPTRZD OPH DX IMG PST SGM ON: CPT | Mod: S$GLB,,, | Performed by: OPTOMETRIST

## 2022-08-11 PROCEDURE — 92004 COMPRE OPH EXAM NEW PT 1/>: CPT | Mod: S$GLB,,, | Performed by: OPTOMETRIST

## 2022-08-11 NOTE — PROGRESS NOTES
HPI     DLS:Dr. Reyna Shankar 07/29/2022    Pt here for blurry Vision.   Pt stated she wanted a second opinion. Pt saw Dr. Shankar when she poked   her eye with her finger. Dr. Shankar stated no injuries, but detected   nerve damage and a elevated eye pressure (glaucoma).    Eye meds:  T QD OU( 3 day ago was the last time).    Last edited by Eriberto Mendoza on 8/11/2022  8:21 AM. (History)        ROS     Negative for: Constitutional, Gastrointestinal, Neurological, Skin,   Genitourinary, Musculoskeletal, HENT, Endocrine, Cardiovascular, Eyes,   Respiratory, Psychiatric, Allergic/Imm, Heme/Lymph    Last edited by Luz Maria Brown, OD on 8/11/2022  9:16 AM. (History)        Assessment /Plan     For exam results, see Encounter Report.    Fitting and adjustment of spectacles and contact lenses    Myopia of both eyes with regular astigmatism and presbyopia    OAG (open angle glaucoma) suspect, low risk, bilateral  -     OCT, Optic Nerve - OU - Both Eyes; Future  -     Kessler Visual Field - OU - Extended - Both Eyes; Future  -     Ultrasound pachymetry; Future    Optic nerve cupping, bilateral  -     OCT, Optic Nerve - OU - Both Eyes; Future  -     Kessler Visual Field - OU - Extended - Both Eyes; Future  -     Ultrasound pachymetry; Future    Complete eye exam, encounter for      MONITOR. ED PT ALL EXAM FINDINGS  H/O POAG SUSPECT OU PER LAST PROVIDER LIKELY SECONDARY TO ONH APPEARANCE OU   TODAYS VISIT REVEALED LOW SUSPICION. NORMOTENSIVE IOP OU; THICK PACHY (603/593); WNL OCT (RNFL) OD, OS   UNABLE TO PERFORM 24-2 VF SECONDARY TO DILATION; REASSESS IN 6 MONTHS  CALLED PT WITH RESULTS OF ALL TESTING   RTC 1 WEEK FOR CL F/U  RTC 4-6 MONTHS FOR 24-2 VF TESTING   MONITOR.

## 2022-08-29 ENCOUNTER — TELEPHONE (OUTPATIENT)
Dept: OPTOMETRY | Facility: CLINIC | Age: 42
End: 2022-08-29
Payer: COMMERCIAL

## 2022-08-29 NOTE — TELEPHONE ENCOUNTER
----- Message from Heydi Chicas sent at 8/29/2022  3:32 PM CDT -----  Regarding: f/u week appt  Contact: Pt  Please call the pt @ [phone]. Please call the pt about a f/u appt

## 2022-09-09 ENCOUNTER — TELEPHONE (OUTPATIENT)
Dept: OPTOMETRY | Facility: CLINIC | Age: 42
End: 2022-09-09
Payer: COMMERCIAL

## 2022-09-13 ENCOUNTER — TELEPHONE (OUTPATIENT)
Dept: OPHTHALMOLOGY | Facility: CLINIC | Age: 42
End: 2022-09-13
Payer: COMMERCIAL

## 2022-09-30 ENCOUNTER — CLINICAL SUPPORT (OUTPATIENT)
Dept: OTHER | Facility: CLINIC | Age: 42
End: 2022-09-30
Payer: COMMERCIAL

## 2022-09-30 DIAGNOSIS — Z00.8 ENCOUNTER FOR OTHER GENERAL EXAMINATION: ICD-10-CM

## 2022-10-03 ENCOUNTER — PATIENT MESSAGE (OUTPATIENT)
Dept: DERMATOLOGY | Facility: CLINIC | Age: 42
End: 2022-10-03

## 2022-10-03 ENCOUNTER — OFFICE VISIT (OUTPATIENT)
Dept: DERMATOLOGY | Facility: CLINIC | Age: 42
End: 2022-10-03
Payer: COMMERCIAL

## 2022-10-03 DIAGNOSIS — D48.5 NEOPLASM OF UNCERTAIN BEHAVIOR OF SKIN: Primary | ICD-10-CM

## 2022-10-03 PROCEDURE — 1159F PR MEDICATION LIST DOCUMENTED IN MEDICAL RECORD: ICD-10-PCS | Mod: CPTII,S$GLB,, | Performed by: DERMATOLOGY

## 2022-10-03 PROCEDURE — 99212 PR OFFICE/OUTPT VISIT, EST, LEVL II, 10-19 MIN: ICD-10-PCS | Mod: S$GLB,,, | Performed by: DERMATOLOGY

## 2022-10-03 PROCEDURE — 99212 OFFICE O/P EST SF 10 MIN: CPT | Mod: S$GLB,,, | Performed by: DERMATOLOGY

## 2022-10-03 PROCEDURE — 99999 PR PBB SHADOW E&M-EST. PATIENT-LVL II: CPT | Mod: PBBFAC,,, | Performed by: DERMATOLOGY

## 2022-10-03 PROCEDURE — 1159F MED LIST DOCD IN RCRD: CPT | Mod: CPTII,S$GLB,, | Performed by: DERMATOLOGY

## 2022-10-03 PROCEDURE — 1160F RVW MEDS BY RX/DR IN RCRD: CPT | Mod: CPTII,S$GLB,, | Performed by: DERMATOLOGY

## 2022-10-03 PROCEDURE — 99999 PR PBB SHADOW E&M-EST. PATIENT-LVL II: ICD-10-PCS | Mod: PBBFAC,,, | Performed by: DERMATOLOGY

## 2022-10-03 PROCEDURE — 1160F PR REVIEW ALL MEDS BY PRESCRIBER/CLIN PHARMACIST DOCUMENTED: ICD-10-PCS | Mod: CPTII,S$GLB,, | Performed by: DERMATOLOGY

## 2022-10-03 NOTE — PROGRESS NOTES
Subjective:       Patient ID:  Karolyn Campbell is a 42 y.o. female who presents for   Chief Complaint   Patient presents with    Lesion     L arm     History of Present Illness: The patient presents for follow up of skin check.    The patient was last seen on: 06/13/2022 by Dr. Mary for skin check and cryosurgery to ISK on face which has somewhat resolved.     Other skin complaints:   Patient with new complaint of lesion(s)  Location: L arm  Duration: 1 week   Symptoms: dark   Relieving factors/Previous treatments: tried to dig it out with nails, washcloth    No h/o nmsc          Review of Systems   Skin:  Positive for daily sunscreen use and activity-related sunscreen use. Negative for tendency to form keloidal scars.   Hematologic/Lymphatic: Bruises/bleeds easily (bruise).      Objective:    Physical Exam   Constitutional: She appears well-developed and well-nourished. No distress.   Neurological: She is alert and oriented to person, place, and time. She is not disoriented.   Psychiatric: She has a normal mood and affect.   Skin:   Areas Examined (abnormalities noted in diagram):   LUE Inspection Performed            Diagram Legend     Erythematous scaling macule/papule c/w actinic keratosis       Vascular papule c/w angioma      Pigmented verrucoid papule/plaque c/w seborrheic keratosis      Yellow umbilicated papule c/w sebaceous hyperplasia      Irregularly shaped tan macule c/w lentigo     1-2 mm smooth white papules consistent with Milia      Movable subcutaneous cyst with punctum c/w epidermal inclusion cyst      Subcutaneous movable cyst c/w pilar cyst      Firm pink to brown papule c/w dermatofibroma      Pedunculated fleshy papule(s) c/w skin tag(s)      Evenly pigmented macule c/w junctional nevus     Mildly variegated pigmented, slightly irregular-bordered macule c/w mildly atypical nevus      Flesh colored to evenly pigmented papule c/w intradermal nevus       Pink pearly papule/plaque c/w basal cell  carcinoma      Erythematous hyperkeratotic cursted plaque c/w SCC      Surgical scar with no sign of skin cancer recurrence      Open and closed comedones      Inflammatory papules and pustules      Verrucoid papule consistent consistent with wart     Erythematous eczematous patches and plaques     Dystrophic onycholytic nail with subungual debris c/w onychomycosis     Umbilicated papule    Erythematous-base heme-crusted tan verrucoid plaque consistent with inflamed seborrheic keratosis     Erythematous Silvery Scaling Plaque c/w Psoriasis     See annotation      Surrounding redness and adjacent small erosion    Assessment / Plan:        Neoplasm of uncertain behavior of skin  Now just a superficial ulceration/erosion - recommend vaseline jelly  Per picture provided by pt, lesion most c/w blue nevus which is benign.            No follow-ups on file.

## 2022-10-17 ENCOUNTER — OFFICE VISIT (OUTPATIENT)
Dept: FAMILY MEDICINE | Facility: CLINIC | Age: 42
End: 2022-10-17
Payer: COMMERCIAL

## 2022-10-17 VITALS
OXYGEN SATURATION: 99 % | SYSTOLIC BLOOD PRESSURE: 120 MMHG | DIASTOLIC BLOOD PRESSURE: 74 MMHG | HEART RATE: 86 BPM | BODY MASS INDEX: 24.22 KG/M2 | TEMPERATURE: 99 F | HEIGHT: 62 IN | WEIGHT: 131.63 LBS

## 2022-10-17 DIAGNOSIS — F43.0 STRESS REACTION CAUSING MIXED DISTURBANCE OF EMOTION AND CONDUCT: ICD-10-CM

## 2022-10-17 DIAGNOSIS — B00.1 RECURRENT COLD SORES: ICD-10-CM

## 2022-10-17 DIAGNOSIS — F90.2 ATTENTION DEFICIT HYPERACTIVITY DISORDER (ADHD), COMBINED TYPE: Primary | ICD-10-CM

## 2022-10-17 PROCEDURE — 3074F PR MOST RECENT SYSTOLIC BLOOD PRESSURE < 130 MM HG: ICD-10-PCS | Mod: CPTII,S$GLB,, | Performed by: NURSE PRACTITIONER

## 2022-10-17 PROCEDURE — 3078F PR MOST RECENT DIASTOLIC BLOOD PRESSURE < 80 MM HG: ICD-10-PCS | Mod: CPTII,S$GLB,, | Performed by: NURSE PRACTITIONER

## 2022-10-17 PROCEDURE — 1160F PR REVIEW ALL MEDS BY PRESCRIBER/CLIN PHARMACIST DOCUMENTED: ICD-10-PCS | Mod: CPTII,S$GLB,, | Performed by: NURSE PRACTITIONER

## 2022-10-17 PROCEDURE — 99214 OFFICE O/P EST MOD 30 MIN: CPT | Mod: S$GLB,,, | Performed by: NURSE PRACTITIONER

## 2022-10-17 PROCEDURE — 99999 PR PBB SHADOW E&M-EST. PATIENT-LVL III: CPT | Mod: PBBFAC,,, | Performed by: NURSE PRACTITIONER

## 2022-10-17 PROCEDURE — 1160F RVW MEDS BY RX/DR IN RCRD: CPT | Mod: CPTII,S$GLB,, | Performed by: NURSE PRACTITIONER

## 2022-10-17 PROCEDURE — 99214 PR OFFICE/OUTPT VISIT, EST, LEVL IV, 30-39 MIN: ICD-10-PCS | Mod: S$GLB,,, | Performed by: NURSE PRACTITIONER

## 2022-10-17 PROCEDURE — 99999 PR PBB SHADOW E&M-EST. PATIENT-LVL III: ICD-10-PCS | Mod: PBBFAC,,, | Performed by: NURSE PRACTITIONER

## 2022-10-17 PROCEDURE — 1159F PR MEDICATION LIST DOCUMENTED IN MEDICAL RECORD: ICD-10-PCS | Mod: CPTII,S$GLB,, | Performed by: NURSE PRACTITIONER

## 2022-10-17 PROCEDURE — 1159F MED LIST DOCD IN RCRD: CPT | Mod: CPTII,S$GLB,, | Performed by: NURSE PRACTITIONER

## 2022-10-17 PROCEDURE — 3074F SYST BP LT 130 MM HG: CPT | Mod: CPTII,S$GLB,, | Performed by: NURSE PRACTITIONER

## 2022-10-17 PROCEDURE — 3078F DIAST BP <80 MM HG: CPT | Mod: CPTII,S$GLB,, | Performed by: NURSE PRACTITIONER

## 2022-10-17 RX ORDER — METHYLPHENIDATE HYDROCHLORIDE 20 MG/1
20 TABLET ORAL 2 TIMES DAILY
Qty: 60 TABLET | Refills: 0 | Status: SHIPPED | OUTPATIENT
Start: 2022-11-16 | End: 2023-01-12 | Stop reason: SDUPTHER

## 2022-10-17 RX ORDER — METHYLPHENIDATE HYDROCHLORIDE 20 MG/1
20 TABLET ORAL 2 TIMES DAILY
Qty: 60 TABLET | Refills: 0 | Status: SHIPPED | OUTPATIENT
Start: 2022-12-16 | End: 2023-01-12 | Stop reason: SDUPTHER

## 2022-10-17 RX ORDER — METHYLPHENIDATE HYDROCHLORIDE 20 MG/1
20 TABLET ORAL 2 TIMES DAILY
Qty: 60 TABLET | Refills: 0 | Status: SHIPPED | OUTPATIENT
Start: 2022-10-17 | End: 2023-01-12 | Stop reason: SDUPTHER

## 2022-10-17 NOTE — PROGRESS NOTES
Subjective:       Patient ID: Karolyn Campbell is a 42 y.o. female.    Chief Complaint: ADHD (3 months Med Check)    HPI    Patient is a 41-year-old white female with ADHD, stress reaction with anxiety, history of HPV, recurrent cold sores,  and chronic allergies that is here today for 3 month follow up  ADHD  1st evaluated for ADHD by psychologist in [place] in the 10th grade.    Diagnosed with ADHD predominantly inattentive and compulsive traits - NO access to records  Treated by Dr. Linda Pineda on 05/28/2018 - started Adderall XR 10 mg daily but not tolerated   Changed to Ritalin 10 mg twice daily on 07/13/2018.   Since February 2019, patient has been on the same dose of Ritalin 20 mg twice daily.   Able to focus and complete tasks without side effects.  She reports no problems with sleep.   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     Stress Reaction  Patient diagnosed in July 2021 with acute stress reaction with increased anxiety and started on Zoloft 25 mg daily.    Increased the Zoloft to 50 mg daily May 2022.   Increased dose is working well without side effects.      Recurrent cold sores  takes Valtrex as needed.    Review of Systems   Constitutional:  Negative for appetite change, chills, fatigue, fever and unexpected weight change.   HENT:  Negative for congestion, ear pain, mouth sores, nosebleeds, postnasal drip, rhinorrhea, sinus pressure, sneezing, sore throat, trouble swallowing and voice change.    Eyes:  Negative for photophobia, pain, discharge, redness, itching and visual disturbance.   Respiratory:  Negative for cough, chest tightness and shortness of breath.    Cardiovascular:  Negative for chest pain, palpitations and leg swelling.   Gastrointestinal:  Negative for abdominal pain, blood in stool, constipation, diarrhea, nausea and vomiting.   Genitourinary:  Negative for dysuria, frequency, hematuria and urgency.   Musculoskeletal:  Negative for arthralgias, back  "pain, joint swelling and myalgias.   Skin:  Negative for color change and rash.   Allergic/Immunologic: Negative for immunocompromised state.   Neurological:  Negative for dizziness, seizures, syncope, weakness and headaches.   Hematological:  Negative for adenopathy. Does not bruise/bleed easily.   Psychiatric/Behavioral:  Negative for agitation, dysphoric mood, sleep disturbance and suicidal ideas. The patient is not nervous/anxious.        Objective:     Vitals:    10/17/22 1636   BP: 120/74   BP Location: Right arm   Patient Position: Sitting   BP Method: Large (Manual)   Pulse: 86   Temp: 98.8 °F (37.1 °C)   TempSrc: Temporal   SpO2: 99%   Weight: 59.7 kg (131 lb 9.8 oz)   Height: 5' 2" (1.575 m)          Physical Exam  Constitutional:       General: She is not in acute distress.     Appearance: Normal appearance. She is well-developed and normal weight. She is not ill-appearing, toxic-appearing or diaphoretic.      Comments: Body mass index is 24.07 kg/m².   HENT:      Head: Normocephalic and atraumatic.   Eyes:      General: No scleral icterus.        Right eye: No discharge.         Left eye: No discharge.      Extraocular Movements: Extraocular movements intact.      Conjunctiva/sclera: Conjunctivae normal.   Neck:      Thyroid: No thyromegaly.      Trachea: No tracheal deviation.   Cardiovascular:      Rate and Rhythm: Normal rate and regular rhythm.      Heart sounds: Normal heart sounds. No murmur heard.  Pulmonary:      Effort: Pulmonary effort is normal. No respiratory distress.      Breath sounds: Normal breath sounds.   Abdominal:      General: There is no distension.   Musculoskeletal:         General: No swelling or deformity. Normal range of motion.      Cervical back: Normal range of motion and neck supple.      Right lower leg: No edema.      Left lower leg: No edema.   Lymphadenopathy:      Cervical: No cervical adenopathy.   Skin:     General: Skin is warm and dry.      Findings: No rash. "   Neurological:      Mental Status: She is alert and oriented to person, place, and time.      Cranial Nerves: No cranial nerve deficit.      Coordination: Coordination normal.   Psychiatric:         Mood and Affect: Mood normal.         Behavior: Behavior normal.         Thought Content: Thought content normal.         Judgment: Judgment normal.         Assessment:         ICD-10-CM ICD-9-CM   1. Attention deficit hyperactivity disorder (ADHD), combined type  F90.2 314.01   2. 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 Recurrent cold sores  B00.1 054.9       Plan:       Attention deficit hyperactivity disorder (ADHD), combined type  Continue current medication(s).  Follow up in 3 months.    -     methylphenidate HCl (RITALIN) 20 MG tablet; Take 1 tablet (20 mg total) by mouth 2 (two) times daily.  Dispense: 60 tablet; Refill: 0  -     methylphenidate HCl (RITALIN) 20 MG tablet; Take 1 tablet (20 mg total) by mouth 2 (two) times daily.  Dispense: 60 tablet; Refill: 0  -     methylphenidate HCl (RITALIN) 20 MG tablet; Take 1 tablet (20 mg total) by mouth 2 (two) times daily.  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  Follow up in 3 months.      Recurrent cold sores  Valtrex prn    Follow up in about 3 months (around 1/15/2023).     Patient's Medications   New Prescriptions    No medications on file   Previous Medications    SERTRALINE (ZOLOFT) 50 MG TABLET    Take 1 tablet (50 mg total) by mouth once daily.    VALACYCLOVIR (VALTREX) 1000 MG TABLET    Take 2 tablets at onset of cold sore and repeat in 12 hours for 1 additional dose as need for cold sore outbreak   Modified Medications    Modified Medication Previous Medication    METHYLPHENIDATE HCL (RITALIN) 20 MG TABLET methylphenidate HCl (RITALIN) 20 MG tablet       Take 1 tablet (20 mg total) by mouth 2 (two) times daily.    Take 1  tablet (20 mg total) by mouth 2 (two) times daily.    METHYLPHENIDATE HCL (RITALIN) 20 MG TABLET methylphenidate HCl (RITALIN) 20 MG tablet       Take 1 tablet (20 mg total) by mouth 2 (two) times daily.    Take 1 tablet (20 mg total) by mouth 2 (two) times daily.    METHYLPHENIDATE HCL (RITALIN) 20 MG TABLET methylphenidate HCl (RITALIN) 20 MG tablet       Take 1 tablet (20 mg total) by mouth 2 (two) times daily.    Take 1 tablet (20 mg total) by mouth 2 (two) times daily.   Discontinued Medications    TIMOLOL MALEATE 0.25% (TIMOPTIC) 0.25 % DROP    Place 1 drop into both eyes once daily.       Past Medical History:   Diagnosis Date    Abnormal Pap smear of cervix     prior to being a pt     ADHD (attention deficit hyperactivity disorder)     Diagnosed by psychologist in [place] in the 10th grade and was on Ritalin from 10th to 12 grade and got off med; she then did not start back on ADHD medication until 2018 with Dr. Linda Pineda here in office    Allergy     Anxiety     Anxiety     Fever blister     HPV (human papilloma virus) infection        Past Surgical History:   Procedure Laterality Date    AUGMENTATION OF BREAST      BREAST SURGERY      breast augmentation in ; and then in  had an implant deflate that needed repair     SECTION      replacement of  breast implant       due to rupture     RHINOPLASTY         Family History   Adopted: Yes   Problem Relation Age of Onset    No Known Problems Daughter     ADD / ADHD Son     Strabismus Son        Social History     Socioeconomic History    Marital status:    Occupational History    Occupation: works curriculum advisor   Tobacco Use    Smoking status: Former     Packs/day: 0.25     Years: 5.00     Pack years: 1.25     Types: Cigarettes     Start date: 2003     Quit date: 2009     Years since quittin.1    Smokeless tobacco: Never   Substance and Sexual Activity    Alcohol use: Yes      Alcohol/week: 0.0 - 1.0 standard drinks     Comment: once a month - 1 to 2 drinks per occasion    Drug use: No    Sexual activity: Yes     Partners: Male     Birth control/protection: Partner-Vasectomy   Other Topics Concern    Are you pregnant or think you may be? No    Breast-feeding No   Social History Narrative    .  2 children     Social Determinants of Health     Financial Resource Strain: Low Risk     Difficulty of Paying Living Expenses: Not hard at all   Food Insecurity: No Food Insecurity    Worried About Running Out of Food in the Last Year: Never true    Ran Out of Food in the Last Year: Never true   Transportation Needs: No Transportation Needs    Lack of Transportation (Medical): No    Lack of Transportation (Non-Medical): No   Physical Activity: Insufficiently Active    Days of Exercise per Week: 2 days    Minutes of Exercise per Session: 30 min   Stress: Stress Concern Present    Feeling of Stress : To some extent   Social Connections: Unknown    Frequency of Communication with Friends and Family: More than three times a week    Frequency of Social Gatherings with Friends and Family: Twice a week    Active Member of Clubs or Organizations: Yes    Attends Club or Organization Meetings: More than 4 times per year    Marital Status:    Housing Stability: Low Risk     Unable to Pay for Housing in the Last Year: No    Number of Places Lived in the Last Year: 1    Unstable Housing in the Last Year: No

## 2022-10-24 LAB
GLUCOSE SERPL-MCNC: 96 MG/DL (ref 60–140)
HDLC SERPL-MCNC: 69 MG/DL
POC CHOLESTEROL, LDL (DOCK): 83 MG/DL
POC CHOLESTEROL, TOTAL: 164 MG/DL
TRIGL SERPL-MCNC: 61 MG/DL

## 2022-10-29 VITALS
WEIGHT: 131 LBS | BODY MASS INDEX: 24.11 KG/M2 | SYSTOLIC BLOOD PRESSURE: 124 MMHG | HEIGHT: 62 IN | DIASTOLIC BLOOD PRESSURE: 78 MMHG

## 2022-11-15 ENCOUNTER — APPOINTMENT (OUTPATIENT)
Dept: RADIOLOGY | Facility: OTHER | Age: 42
End: 2022-11-15
Attending: OBSTETRICS & GYNECOLOGY
Payer: COMMERCIAL

## 2022-11-15 VITALS — BODY MASS INDEX: 24.22 KG/M2 | WEIGHT: 131.63 LBS | HEIGHT: 62 IN

## 2022-11-15 DIAGNOSIS — Z12.31 BREAST CANCER SCREENING BY MAMMOGRAM: ICD-10-CM

## 2022-11-15 PROCEDURE — 77067 SCR MAMMO BI INCL CAD: CPT | Mod: 26,,, | Performed by: RADIOLOGY

## 2022-11-15 PROCEDURE — 77067 SCR MAMMO BI INCL CAD: CPT | Mod: TC,PN

## 2022-11-15 PROCEDURE — 77063 BREAST TOMOSYNTHESIS BI: CPT | Mod: TC,PN

## 2022-11-15 PROCEDURE — 77067 MAMMO DIGITAL SCREENING BILAT WITH TOMO: ICD-10-PCS | Mod: 26,,, | Performed by: RADIOLOGY

## 2022-11-15 PROCEDURE — 77063 BREAST TOMOSYNTHESIS BI: CPT | Mod: 26,,, | Performed by: RADIOLOGY

## 2022-11-15 PROCEDURE — 77063 MAMMO DIGITAL SCREENING BILAT WITH TOMO: ICD-10-PCS | Mod: 26,,, | Performed by: RADIOLOGY

## 2022-12-13 ENCOUNTER — OFFICE VISIT (OUTPATIENT)
Dept: URGENT CARE | Facility: CLINIC | Age: 42
End: 2022-12-13
Payer: COMMERCIAL

## 2022-12-13 VITALS
OXYGEN SATURATION: 98 % | RESPIRATION RATE: 18 BRPM | HEIGHT: 62 IN | BODY MASS INDEX: 23.37 KG/M2 | DIASTOLIC BLOOD PRESSURE: 73 MMHG | TEMPERATURE: 99 F | HEART RATE: 103 BPM | WEIGHT: 127 LBS | SYSTOLIC BLOOD PRESSURE: 104 MMHG

## 2022-12-13 DIAGNOSIS — J34.89 SINUS PRESSURE: ICD-10-CM

## 2022-12-13 DIAGNOSIS — G44.83 COUGH HEADACHE: Primary | ICD-10-CM

## 2022-12-13 DIAGNOSIS — R09.81 NASAL CONGESTION: ICD-10-CM

## 2022-12-13 DIAGNOSIS — R52 BODY ACHES: ICD-10-CM

## 2022-12-13 LAB
CTP QC/QA: YES
CTP QC/QA: YES
POC MOLECULAR INFLUENZA A AGN: NEGATIVE
POC MOLECULAR INFLUENZA B AGN: NEGATIVE
SARS-COV-2 AG RESP QL IA.RAPID: NEGATIVE

## 2022-12-13 PROCEDURE — 1159F PR MEDICATION LIST DOCUMENTED IN MEDICAL RECORD: ICD-10-PCS | Mod: CPTII,S$GLB,, | Performed by: NURSE PRACTITIONER

## 2022-12-13 PROCEDURE — 99213 PR OFFICE/OUTPT VISIT, EST, LEVL III, 20-29 MIN: ICD-10-PCS | Mod: S$GLB,,, | Performed by: NURSE PRACTITIONER

## 2022-12-13 PROCEDURE — 87811 SARS-COV-2 COVID19 W/OPTIC: CPT | Mod: QW,S$GLB,, | Performed by: NURSE PRACTITIONER

## 2022-12-13 PROCEDURE — 1160F RVW MEDS BY RX/DR IN RCRD: CPT | Mod: CPTII,S$GLB,, | Performed by: NURSE PRACTITIONER

## 2022-12-13 PROCEDURE — 87502 INFLUENZA DNA AMP PROBE: CPT | Mod: QW,S$GLB,, | Performed by: NURSE PRACTITIONER

## 2022-12-13 PROCEDURE — 1160F PR REVIEW ALL MEDS BY PRESCRIBER/CLIN PHARMACIST DOCUMENTED: ICD-10-PCS | Mod: CPTII,S$GLB,, | Performed by: NURSE PRACTITIONER

## 2022-12-13 PROCEDURE — 3008F PR BODY MASS INDEX (BMI) DOCUMENTED: ICD-10-PCS | Mod: CPTII,S$GLB,, | Performed by: NURSE PRACTITIONER

## 2022-12-13 PROCEDURE — 87811 SARS CORONAVIRUS 2 ANTIGEN POCT, MANUAL READ: ICD-10-PCS | Mod: QW,S$GLB,, | Performed by: NURSE PRACTITIONER

## 2022-12-13 PROCEDURE — 87502 POCT INFLUENZA A/B MOLECULAR: ICD-10-PCS | Mod: QW,S$GLB,, | Performed by: NURSE PRACTITIONER

## 2022-12-13 PROCEDURE — 3008F BODY MASS INDEX DOCD: CPT | Mod: CPTII,S$GLB,, | Performed by: NURSE PRACTITIONER

## 2022-12-13 PROCEDURE — 3078F DIAST BP <80 MM HG: CPT | Mod: CPTII,S$GLB,, | Performed by: NURSE PRACTITIONER

## 2022-12-13 PROCEDURE — 1159F MED LIST DOCD IN RCRD: CPT | Mod: CPTII,S$GLB,, | Performed by: NURSE PRACTITIONER

## 2022-12-13 PROCEDURE — 3078F PR MOST RECENT DIASTOLIC BLOOD PRESSURE < 80 MM HG: ICD-10-PCS | Mod: CPTII,S$GLB,, | Performed by: NURSE PRACTITIONER

## 2022-12-13 PROCEDURE — 3074F PR MOST RECENT SYSTOLIC BLOOD PRESSURE < 130 MM HG: ICD-10-PCS | Mod: CPTII,S$GLB,, | Performed by: NURSE PRACTITIONER

## 2022-12-13 PROCEDURE — 3074F SYST BP LT 130 MM HG: CPT | Mod: CPTII,S$GLB,, | Performed by: NURSE PRACTITIONER

## 2022-12-13 PROCEDURE — 99213 OFFICE O/P EST LOW 20 MIN: CPT | Mod: S$GLB,,, | Performed by: NURSE PRACTITIONER

## 2022-12-13 NOTE — PROGRESS NOTES
"Subjective:       Patient ID: Karolyn Campbell is a 42 y.o. female.    Vitals:  height is 5' 2" (1.575 m) and weight is 57.6 kg (127 lb). Her oral temperature is 98.5 °F (36.9 °C). Her blood pressure is 104/73 and her pulse is 103. Her respiration is 18 and oxygen saturation is 98%.     Chief Complaint: URI    Yesterday evening pt started having body aches, and later that night she was running a fever of 102. Pt have been taking Advil twice a day to keep her fever down.Pt started feeling congested, and hears the mucus but it don't want to come up.     42-year-old female presents to clinic with complaints of body ache, sinus pressure, sneezing, fever (102) nasal congestion, chest congestion x 2 days. History positive for Pfizer covid vaccine x e doses 2/24/21, 3/17/21, 11/30/21, no influenza vaccine this season.           URI   This is a new problem. The current episode started yesterday. The problem has been gradually worsening. The maximum temperature recorded prior to her arrival was 102 - 102.9 F. The fever has been present for 1 to 2 days. Associated symptoms include congestion, coughing, headaches, neck pain, sinus pain and sneezing. Pertinent negatives include no abdominal pain, chest pain, diarrhea, ear pain, joint pain, joint swelling, nausea, plugged ear sensation, rash, sore throat, swollen glands, vomiting or wheezing. She has tried NSAIDs (advil) for the symptoms. The treatment provided mild relief.     HENT:  Positive for congestion and sinus pain. Negative for ear pain and sore throat.    Neck: Positive for neck pain.   Cardiovascular:  Negative for chest pain.   Respiratory:  Positive for cough. Negative for wheezing.    Gastrointestinal:  Negative for abdominal pain, nausea, vomiting and diarrhea.   Skin:  Negative for rash.   Allergic/Immunologic: Positive for sneezing.   Neurological:  Positive for headaches.     Objective:      Physical Exam   Constitutional: She is oriented to person, place, and " time. She appears well-developed. She is cooperative.  Non-toxic appearance. She does not appear ill. No distress.   HENT:   Head: Normocephalic and atraumatic.   Ears:   Right Ear: Hearing, external ear and ear canal normal. Tympanic membrane is bulging.   Left Ear: Hearing, external ear and ear canal normal. Tympanic membrane is bulging.   Nose: Mucosal edema and rhinorrhea present. No nasal deformity. No epistaxis. Right sinus exhibits no maxillary sinus tenderness and no frontal sinus tenderness. Left sinus exhibits no maxillary sinus tenderness and no frontal sinus tenderness.   Mouth/Throat: Uvula is midline, oropharynx is clear and moist and mucous membranes are normal. No trismus in the jaw. Normal dentition. No uvula swelling. No oropharyngeal exudate, posterior oropharyngeal edema or posterior oropharyngeal erythema.   Eyes: Conjunctivae and lids are normal. No scleral icterus.   Neck: Trachea normal and phonation normal. Neck supple. No edema present. No erythema present. No neck rigidity present.   Cardiovascular: Normal rate, regular rhythm, normal heart sounds and normal pulses.   Pulmonary/Chest: Effort normal and breath sounds normal. No respiratory distress. She has no decreased breath sounds. She has no rhonchi.   Abdominal: Normal appearance.   Musculoskeletal: Normal range of motion.         General: No deformity. Normal range of motion.   Neurological: She is alert and oriented to person, place, and time. She exhibits normal muscle tone. Coordination normal.   Skin: Skin is warm, dry, intact, not diaphoretic and not pale.   Psychiatric: Her speech is normal and behavior is normal. Judgment and thought content normal.   Nursing note and vitals reviewed.      Assessment:       1. Cough headache    2. Body aches    3. Sinus pressure    4. Nasal congestion        Results for orders placed or performed in visit on 12/13/22   SARS Coronavirus 2 Antigen, POCT Manual Read   Result Value Ref Range     SARS Coronavirus 2 Antigen Negative Negative     Acceptable Yes    POCT Influenza A/B MOLECULAR   Result Value Ref Range    POC Molecular Influenza A Ag Negative Negative, Not Reported    POC Molecular Influenza B Ag Negative Negative, Not Reported     Acceptable Yes       Plan:         Cough headache  -     SARS Coronavirus 2 Antigen, POCT Manual Read  -     POCT Influenza A/B MOLECULAR    Body aches    Sinus pressure    Nasal congestion         Patient Instructions   Reviewed negative COVID-19 virus and flu test with patient who verbalized understanding.  Advised patient that her symptoms are indicative of an upper respiratory infection which is viral in nature and should be treated symptomatically.  We discussed over-the-counter medications as well as home remedies to help with current symptoms.   Patient educational handouts also included in discharge paperwork for patient who verbalized understanding agrees with plan of care.  She denies any further questions or concerns at this time.  Patient exits exam room in no acute distress.

## 2023-01-12 ENCOUNTER — OFFICE VISIT (OUTPATIENT)
Dept: FAMILY MEDICINE | Facility: CLINIC | Age: 43
End: 2023-01-12
Payer: COMMERCIAL

## 2023-01-12 VITALS
WEIGHT: 130.75 LBS | TEMPERATURE: 98 F | HEIGHT: 62 IN | BODY MASS INDEX: 24.06 KG/M2 | SYSTOLIC BLOOD PRESSURE: 120 MMHG | DIASTOLIC BLOOD PRESSURE: 80 MMHG | OXYGEN SATURATION: 99 % | HEART RATE: 84 BPM

## 2023-01-12 DIAGNOSIS — F90.2 ATTENTION DEFICIT HYPERACTIVITY DISORDER (ADHD), COMBINED TYPE: Primary | ICD-10-CM

## 2023-01-12 DIAGNOSIS — F43.0 STRESS REACTION CAUSING MIXED DISTURBANCE OF EMOTION AND CONDUCT: ICD-10-CM

## 2023-01-12 DIAGNOSIS — B00.1 RECURRENT COLD SORES: ICD-10-CM

## 2023-01-12 PROCEDURE — 1159F PR MEDICATION LIST DOCUMENTED IN MEDICAL RECORD: ICD-10-PCS | Mod: CPTII,S$GLB,, | Performed by: NURSE PRACTITIONER

## 2023-01-12 PROCEDURE — 99214 OFFICE O/P EST MOD 30 MIN: CPT | Mod: S$GLB,,, | Performed by: NURSE PRACTITIONER

## 2023-01-12 PROCEDURE — 3074F SYST BP LT 130 MM HG: CPT | Mod: CPTII,S$GLB,, | Performed by: NURSE PRACTITIONER

## 2023-01-12 PROCEDURE — 1160F RVW MEDS BY RX/DR IN RCRD: CPT | Mod: CPTII,S$GLB,, | Performed by: NURSE PRACTITIONER

## 2023-01-12 PROCEDURE — 3079F PR MOST RECENT DIASTOLIC BLOOD PRESSURE 80-89 MM HG: ICD-10-PCS | Mod: CPTII,S$GLB,, | Performed by: NURSE PRACTITIONER

## 2023-01-12 PROCEDURE — 3008F BODY MASS INDEX DOCD: CPT | Mod: CPTII,S$GLB,, | Performed by: NURSE PRACTITIONER

## 2023-01-12 PROCEDURE — 1160F PR REVIEW ALL MEDS BY PRESCRIBER/CLIN PHARMACIST DOCUMENTED: ICD-10-PCS | Mod: CPTII,S$GLB,, | Performed by: NURSE PRACTITIONER

## 2023-01-12 PROCEDURE — 1159F MED LIST DOCD IN RCRD: CPT | Mod: CPTII,S$GLB,, | Performed by: NURSE PRACTITIONER

## 2023-01-12 PROCEDURE — 3008F PR BODY MASS INDEX (BMI) DOCUMENTED: ICD-10-PCS | Mod: CPTII,S$GLB,, | Performed by: NURSE PRACTITIONER

## 2023-01-12 PROCEDURE — 99999 PR PBB SHADOW E&M-EST. PATIENT-LVL III: ICD-10-PCS | Mod: PBBFAC,,, | Performed by: NURSE PRACTITIONER

## 2023-01-12 PROCEDURE — 3074F PR MOST RECENT SYSTOLIC BLOOD PRESSURE < 130 MM HG: ICD-10-PCS | Mod: CPTII,S$GLB,, | Performed by: NURSE PRACTITIONER

## 2023-01-12 PROCEDURE — 99999 PR PBB SHADOW E&M-EST. PATIENT-LVL III: CPT | Mod: PBBFAC,,, | Performed by: NURSE PRACTITIONER

## 2023-01-12 PROCEDURE — 99214 PR OFFICE/OUTPT VISIT, EST, LEVL IV, 30-39 MIN: ICD-10-PCS | Mod: S$GLB,,, | Performed by: NURSE PRACTITIONER

## 2023-01-12 PROCEDURE — 3079F DIAST BP 80-89 MM HG: CPT | Mod: CPTII,S$GLB,, | Performed by: NURSE PRACTITIONER

## 2023-01-12 RX ORDER — METHYLPHENIDATE HYDROCHLORIDE 20 MG/1
20 TABLET ORAL 2 TIMES DAILY
Qty: 60 TABLET | Refills: 0 | Status: SHIPPED | OUTPATIENT
Start: 2023-02-13 | End: 2023-04-05 | Stop reason: SDUPTHER

## 2023-01-12 RX ORDER — METHYLPHENIDATE HYDROCHLORIDE 20 MG/1
20 TABLET ORAL 2 TIMES DAILY
Qty: 60 TABLET | Refills: 0 | Status: SHIPPED | OUTPATIENT
Start: 2023-03-14 | End: 2023-04-05 | Stop reason: SDUPTHER

## 2023-01-12 RX ORDER — METHYLPHENIDATE HYDROCHLORIDE 20 MG/1
20 TABLET ORAL 2 TIMES DAILY
Qty: 60 TABLET | Refills: 0 | Status: SHIPPED | OUTPATIENT
Start: 2023-01-13 | End: 2023-04-05 | Stop reason: SDUPTHER

## 2023-01-12 NOTE — PROGRESS NOTES
Subjective:       Patient ID: Karolyn Campbell is a 42 y.o. female.    Chief Complaint: ADHD (F/U Med Check)    HPI    Patient is a 42-year-old white female with ADHD, stress reaction with anxiety, history of HPV, recurrent cold sores, and chronic allergies that is here today for 3 month follow up    ADHD  1st evaluated for ADHD by psychologist in [place] in the 10th grade.    Diagnosed with ADHD predominantly inattentive and compulsive traits - NO access to records  Treated by Dr. Linda Pineda on 05/28/2018 - started Adderall XR 10 mg daily but not tolerated   Changed to Ritalin 10 mg twice daily on 07/13/2018.   Since February 2019, patient has been on the same dose of Ritalin 20 mg twice daily.   Able to focus and complete tasks without side effects.  She reports no problems with sleep.   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     Stress Reaction  Patient diagnosed in July 2021 with acute stress reaction with increased anxiety and started on Zoloft 25 mg daily.    Increased the Zoloft to 50 mg daily May 2022.   Increased dose is working well without side effects.      Recurrent cold sores  takes Valtrex as needed.      Review of Systems   Constitutional:  Negative for appetite change, chills, fatigue, fever and unexpected weight change.   HENT:  Negative for congestion, ear pain, mouth sores, nosebleeds, postnasal drip, rhinorrhea, sinus pressure, sneezing, sore throat, trouble swallowing and voice change.    Eyes:  Negative for photophobia, pain, discharge, redness, itching and visual disturbance.   Respiratory:  Negative for cough, chest tightness and shortness of breath.    Cardiovascular:  Negative for chest pain, palpitations and leg swelling.   Gastrointestinal:  Negative for abdominal pain, blood in stool, constipation, diarrhea, nausea and vomiting.   Genitourinary:  Negative for dysuria, frequency, hematuria and urgency.   Musculoskeletal:  Negative for arthralgias, back  "pain, joint swelling and myalgias.   Skin:  Negative for color change and rash.   Allergic/Immunologic: Negative for immunocompromised state.   Neurological:  Negative for dizziness, seizures, syncope, weakness and headaches.   Hematological:  Negative for adenopathy. Does not bruise/bleed easily.   Psychiatric/Behavioral:  Negative for agitation, dysphoric mood, sleep disturbance and suicidal ideas. The patient is not nervous/anxious.        Objective:     Vitals:    01/12/23 1633   BP: 120/80   BP Location: Left arm   Patient Position: Sitting   BP Method: Large (Manual)   Pulse: 84   Temp: 97.9 °F (36.6 °C)   TempSrc: Temporal   SpO2: 99%   Weight: 59.3 kg (130 lb 11.7 oz)   Height: 5' 2" (1.575 m)          Physical Exam  Constitutional:       General: She is not in acute distress.     Appearance: Normal appearance. She is well-developed and normal weight. She is not ill-appearing, toxic-appearing or diaphoretic.      Comments: Body mass index is 24.07 kg/m².   HENT:      Head: Normocephalic and atraumatic.   Eyes:      General: No scleral icterus.        Right eye: No discharge.         Left eye: No discharge.      Extraocular Movements: Extraocular movements intact.      Conjunctiva/sclera: Conjunctivae normal.   Neck:      Thyroid: No thyromegaly.      Trachea: No tracheal deviation.   Cardiovascular:      Rate and Rhythm: Normal rate and regular rhythm.      Heart sounds: Normal heart sounds. No murmur heard.  Pulmonary:      Effort: Pulmonary effort is normal. No respiratory distress.      Breath sounds: Normal breath sounds.   Abdominal:      General: There is no distension.   Musculoskeletal:         General: No swelling or deformity. Normal range of motion.      Cervical back: Normal range of motion and neck supple.      Right lower leg: No edema.      Left lower leg: No edema.   Lymphadenopathy:      Cervical: No cervical adenopathy.   Skin:     General: Skin is warm and dry.      Findings: No rash. "   Neurological:      Mental Status: She is alert and oriented to person, place, and time.      Cranial Nerves: No cranial nerve deficit.      Coordination: Coordination normal.   Psychiatric:         Mood and Affect: Mood normal.         Behavior: Behavior normal.         Thought Content: Thought content normal.         Judgment: Judgment normal.         Assessment:         ICD-10-CM ICD-9-CM   1. 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 Attention deficit hyperactivity disorder (ADHD), combined type  F90.2 314.01   3. Recurrent cold sores  B00.1 054.9       Plan:       Attention deficit hyperactivity disorder (ADHD), combined type  Controlled on medications  -     methylphenidate HCl (RITALIN) 20 MG tablet; Take 1 tablet (20 mg total) by mouth 2 (two) times daily.  Dispense: 60 tablet; Refill: 0  -     methylphenidate HCl (RITALIN) 20 MG tablet; Take 1 tablet (20 mg total) by mouth 2 (two) times daily.  Dispense: 60 tablet; Refill: 0  -     methylphenidate HCl (RITALIN) 20 MG tablet; Take 1 tablet (20 mg total) by mouth 2 (two) times daily.  Dispense: 60 tablet; Refill: 0    Stress Reaction  Continue current medication(s).  Follow up in 3 months.    Follow up in about 3 months (around 4/12/2023) for ADHD follow up.     Patient's Medications   New Prescriptions    No medications on file   Previous Medications    SERTRALINE (ZOLOFT) 50 MG TABLET    Take 1 tablet (50 mg total) by mouth once daily.    VALACYCLOVIR (VALTREX) 1000 MG TABLET    Take 2 tablets at onset of cold sore and repeat in 12 hours for 1 additional dose as need for cold sore outbreak   Modified Medications    Modified Medication Previous Medication    METHYLPHENIDATE HCL (RITALIN) 20 MG TABLET methylphenidate HCl (RITALIN) 20 MG tablet       Take 1 tablet (20 mg total) by mouth 2 (two) times daily.    Take 1 tablet (20 mg total) by mouth 2 (two) times daily.    METHYLPHENIDATE HCL (RITALIN) 20 MG TABLET  methylphenidate HCl (RITALIN) 20 MG tablet       Take 1 tablet (20 mg total) by mouth 2 (two) times daily.    Take 1 tablet (20 mg total) by mouth 2 (two) times daily.    METHYLPHENIDATE HCL (RITALIN) 20 MG TABLET methylphenidate HCl (RITALIN) 20 MG tablet       Take 1 tablet (20 mg total) by mouth 2 (two) times daily.    Take 1 tablet (20 mg total) by mouth 2 (two) times daily.   Discontinued Medications    No medications on file       Past Medical History:   Diagnosis Date    Abnormal Pap smear of cervix     prior to being a pt     ADHD (attention deficit hyperactivity disorder)     Diagnosed by psychologist in [place] in the 10th grade and was on Ritalin from 10th to 12 grade and got off med; she then did not start back on ADHD medication until 2018 with Dr. Linda Pineda here in office    Allergy     Anxiety     Anxiety     Fever blister     HPV (human papilloma virus) infection        Past Surgical History:   Procedure Laterality Date    AUGMENTATION OF BREAST      BREAST SURGERY      breast augmentation in ; and then in  had an implant deflate that needed repair     SECTION      replacement of  breast implant       due to rupture     RHINOPLASTY         Family History   Adopted: Yes   Problem Relation Age of Onset    No Known Problems Daughter     ADD / ADHD Son     Strabismus Son        Social History     Socioeconomic History    Marital status:    Occupational History    Occupation: works curriculum advisor   Tobacco Use    Smoking status: Former     Packs/day: 0.25     Years: 5.00     Pack years: 1.25     Types: Cigarettes     Start date: 2003     Quit date: 2009     Years since quittin.3    Smokeless tobacco: Never   Substance and Sexual Activity    Alcohol use: Yes     Alcohol/week: 0.0 - 1.0 standard drinks     Comment: once a month - 1 to 2 drinks per occasion    Drug use: No    Sexual activity: Yes     Partners: Male     Birth  control/protection: Partner-Vasectomy   Other Topics Concern    Are you pregnant or think you may be? No    Breast-feeding No   Social History Narrative    .  2 children     Social Determinants of Health     Financial Resource Strain: Low Risk     Difficulty of Paying Living Expenses: Not hard at all   Food Insecurity: No Food Insecurity    Worried About Running Out of Food in the Last Year: Never true    Ran Out of Food in the Last Year: Never true   Transportation Needs: No Transportation Needs    Lack of Transportation (Medical): No    Lack of Transportation (Non-Medical): No   Physical Activity: Insufficiently Active    Days of Exercise per Week: 2 days    Minutes of Exercise per Session: 30 min   Stress: Stress Concern Present    Feeling of Stress : To some extent   Social Connections: Unknown    Frequency of Communication with Friends and Family: More than three times a week    Frequency of Social Gatherings with Friends and Family: Twice a week    Active Member of Clubs or Organizations: Yes    Attends Club or Organization Meetings: More than 4 times per year    Marital Status:    Housing Stability: Low Risk     Unable to Pay for Housing in the Last Year: No    Number of Places Lived in the Last Year: 1    Unstable Housing in the Last Year: No

## 2023-02-11 DIAGNOSIS — B00.1 RECURRENT COLD SORES: ICD-10-CM

## 2023-02-13 RX ORDER — VALACYCLOVIR HYDROCHLORIDE 1 G/1
TABLET, FILM COATED ORAL
Qty: 30 TABLET | Refills: 3 | Status: SHIPPED | OUTPATIENT
Start: 2023-02-13

## 2023-04-05 ENCOUNTER — OFFICE VISIT (OUTPATIENT)
Dept: FAMILY MEDICINE | Facility: CLINIC | Age: 43
End: 2023-04-05
Payer: COMMERCIAL

## 2023-04-05 VITALS
BODY MASS INDEX: 23.72 KG/M2 | HEART RATE: 100 BPM | HEIGHT: 62 IN | OXYGEN SATURATION: 98 % | SYSTOLIC BLOOD PRESSURE: 110 MMHG | DIASTOLIC BLOOD PRESSURE: 86 MMHG | WEIGHT: 128.88 LBS | TEMPERATURE: 98 F

## 2023-04-05 DIAGNOSIS — B00.1 RECURRENT COLD SORES: ICD-10-CM

## 2023-04-05 DIAGNOSIS — F90.2 ATTENTION DEFICIT HYPERACTIVITY DISORDER (ADHD), COMBINED TYPE: Primary | ICD-10-CM

## 2023-04-05 DIAGNOSIS — F43.0 STRESS REACTION CAUSING MIXED DISTURBANCE OF EMOTION AND CONDUCT: ICD-10-CM

## 2023-04-05 PROCEDURE — 1160F PR REVIEW ALL MEDS BY PRESCRIBER/CLIN PHARMACIST DOCUMENTED: ICD-10-PCS | Mod: CPTII,S$GLB,, | Performed by: NURSE PRACTITIONER

## 2023-04-05 PROCEDURE — 1160F RVW MEDS BY RX/DR IN RCRD: CPT | Mod: CPTII,S$GLB,, | Performed by: NURSE PRACTITIONER

## 2023-04-05 PROCEDURE — 3079F DIAST BP 80-89 MM HG: CPT | Mod: CPTII,S$GLB,, | Performed by: NURSE PRACTITIONER

## 2023-04-05 PROCEDURE — 99999 PR PBB SHADOW E&M-EST. PATIENT-LVL III: ICD-10-PCS | Mod: PBBFAC,,, | Performed by: NURSE PRACTITIONER

## 2023-04-05 PROCEDURE — 1159F PR MEDICATION LIST DOCUMENTED IN MEDICAL RECORD: ICD-10-PCS | Mod: CPTII,S$GLB,, | Performed by: NURSE PRACTITIONER

## 2023-04-05 PROCEDURE — 3074F SYST BP LT 130 MM HG: CPT | Mod: CPTII,S$GLB,, | Performed by: NURSE PRACTITIONER

## 2023-04-05 PROCEDURE — 99214 PR OFFICE/OUTPT VISIT, EST, LEVL IV, 30-39 MIN: ICD-10-PCS | Mod: S$GLB,,, | Performed by: NURSE PRACTITIONER

## 2023-04-05 PROCEDURE — 3079F PR MOST RECENT DIASTOLIC BLOOD PRESSURE 80-89 MM HG: ICD-10-PCS | Mod: CPTII,S$GLB,, | Performed by: NURSE PRACTITIONER

## 2023-04-05 PROCEDURE — 99214 OFFICE O/P EST MOD 30 MIN: CPT | Mod: S$GLB,,, | Performed by: NURSE PRACTITIONER

## 2023-04-05 PROCEDURE — 3008F BODY MASS INDEX DOCD: CPT | Mod: CPTII,S$GLB,, | Performed by: NURSE PRACTITIONER

## 2023-04-05 PROCEDURE — 99999 PR PBB SHADOW E&M-EST. PATIENT-LVL III: CPT | Mod: PBBFAC,,, | Performed by: NURSE PRACTITIONER

## 2023-04-05 PROCEDURE — 1159F MED LIST DOCD IN RCRD: CPT | Mod: CPTII,S$GLB,, | Performed by: NURSE PRACTITIONER

## 2023-04-05 PROCEDURE — 3074F PR MOST RECENT SYSTOLIC BLOOD PRESSURE < 130 MM HG: ICD-10-PCS | Mod: CPTII,S$GLB,, | Performed by: NURSE PRACTITIONER

## 2023-04-05 PROCEDURE — 3008F PR BODY MASS INDEX (BMI) DOCUMENTED: ICD-10-PCS | Mod: CPTII,S$GLB,, | Performed by: NURSE PRACTITIONER

## 2023-04-05 RX ORDER — METHYLPHENIDATE HYDROCHLORIDE 20 MG/1
20 TABLET ORAL 2 TIMES DAILY
Qty: 60 TABLET | Refills: 0 | Status: SHIPPED | OUTPATIENT
Start: 2023-04-12 | End: 2023-07-11 | Stop reason: SDUPTHER

## 2023-04-05 RX ORDER — METHYLPHENIDATE HYDROCHLORIDE 20 MG/1
20 TABLET ORAL 2 TIMES DAILY
Qty: 60 TABLET | Refills: 0 | Status: SHIPPED | OUTPATIENT
Start: 2023-05-11 | End: 2023-07-11 | Stop reason: SDUPTHER

## 2023-04-05 RX ORDER — METHYLPHENIDATE HYDROCHLORIDE 20 MG/1
20 TABLET ORAL 2 TIMES DAILY
Qty: 60 TABLET | Refills: 0 | Status: SHIPPED | OUTPATIENT
Start: 2023-06-09 | End: 2023-07-11 | Stop reason: SDUPTHER

## 2023-04-05 NOTE — PROGRESS NOTES
[facility] NICU   Intensive Care Unit Attending Daily Progress Note    Name: Tae Guevara (Baby1 Rose Guevara)       MRN#[id number]  Parents: Rose Guevara  YOB: 2019 1:31 AM  Date of Admission: 2018  ____    History of Present Illness    34w5d, small for gestational age, 4 lb 1.6 oz (1860 g), male infant born by CS due to maternal pre-eclampsia with severe features and breech presentation.  The infant was admitted to the NICU for further evaluation, monitoring and management of prematurity, RDS and possible sepsis.    Patient Active Problem List   Diagnosis     Late  infant, 34w5d GA     SGA (small for gestational age) by weight, 1,930 grams BW     Poor feeding of      [place] affected by maternal preeclampsia     Need for observation and evaluation of  for sepsis     UTI of  - Enterococcus faecalis     Breech presentation     Abnormal findings on  metabolic screening     Interval History   No acute concerns overnight. Stable on RA.  Remains on abx for UTI.  Afeb, VSS, RA, no apnea, appropriate weight gain on full fortified po/gavage feeds.      Assessment & Plan   Overall Status:  20 day old  male infant, now at 37w4d PMA who is transitioning to oral feedings.  This patient, whose weight is < 5000 grams, is no longer critically ill.   He still requires gavage feeds and CR monitoring.    FEN:    Vitals:    18 2210 18 1600 18 1800   Weight: 2.21 kg (4 lb 14 oz) 2.23 kg (4 lb 14.7 oz) 2.23 kg (4 lb 14.7 oz)   Weight change:      Malnutrition. Acceptable post- growth.   Appropriate I/O, ~ at fluid goal with adequate UO and stool. Last gavage at 10pm , takes more by bottle than breast feeding.     Continue:  - TF goal 160 ml/kg/day on IDF schedule.  - po/gavage feeds with MBM fortified to 22kcal/oz or Neosure. Mom has completed fortification  Subjective:       Patient ID: Karolyn Campbell is a 42 y.o. female.    Chief Complaint: ADHD (3 months Med Check)    HPI    Patient is a 42-year-old white female with ADHD, stress reaction with anxiety, history of HPV, recurrent cold sores, and chronic allergies that is here today for 3 month follow up     ADHD  1st evaluated for ADHD by psychologist in [place] in the 10th grade.    Diagnosed with ADHD predominantly inattentive and compulsive traits - NO access to records  Treated by Dr. Linda Pineda on 05/28/2018 - started Adderall XR 10 mg daily but not tolerated   Changed to Ritalin 10 mg twice daily on 07/13/2018.   Since February 2019, patient has been on the same dose of Ritalin 20 mg twice daily.   Able to focus and complete tasks without side effects.  She reports no problems with sleep.   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     Stress Reaction  Patient diagnosed in July 2021 with acute stress reaction with increased anxiety and started on Zoloft 25 mg daily.    Increased the Zoloft to 50 mg daily May 2022.   Increased dose is working well without side effects.      Recurrent cold sores  takes Valtrex as needed.    Review of Systems   Constitutional:  Negative for appetite change, chills, fatigue, fever and unexpected weight change.   HENT:  Negative for congestion, ear pain, mouth sores, nosebleeds, postnasal drip, rhinorrhea, sinus pressure, sneezing, sore throat, trouble swallowing and voice change.    Eyes:  Negative for photophobia, pain, discharge, redness, itching and visual disturbance.   Respiratory:  Negative for cough, chest tightness and shortness of breath.    Cardiovascular:  Negative for chest pain, palpitations and leg swelling.   Gastrointestinal:  Negative for abdominal pain, blood in stool, constipation, diarrhea, nausea and vomiting.   Genitourinary:  Negative for dysuria, frequency, hematuria and urgency.   Musculoskeletal:  Negative for arthralgias,  "back pain, joint swelling and myalgias.   Skin:  Negative for color change and rash.   Allergic/Immunologic: Negative for immunocompromised state.   Neurological:  Negative for dizziness, seizures, syncope, weakness and headaches.   Hematological:  Negative for adenopathy. Does not bruise/bleed easily.   Psychiatric/Behavioral:  Negative for agitation, dysphoric mood, sleep disturbance and suicidal ideas. The patient is not nervous/anxious.        Objective:     Vitals:    04/05/23 1536   BP: 110/86   BP Location: Right arm   Patient Position: Sitting   BP Method: Large (Manual)   Pulse: 100   Temp: 98.1 °F (36.7 °C)   TempSrc: Temporal   SpO2: 98%   Weight: 58.4 kg (128 lb 13.7 oz)   Height: 5' 2" (1.575 m)          Physical Exam  Constitutional:       General: She is not in acute distress.     Appearance: Normal appearance. She is well-developed and normal weight. She is not ill-appearing, toxic-appearing or diaphoretic.      Comments: Body mass index is 23.57 kg/m².     HENT:      Head: Normocephalic and atraumatic.   Eyes:      General: No scleral icterus.        Right eye: No discharge.         Left eye: No discharge.      Extraocular Movements: Extraocular movements intact.      Conjunctiva/sclera: Conjunctivae normal.   Neck:      Thyroid: No thyromegaly.      Trachea: No tracheal deviation.   Cardiovascular:      Rate and Rhythm: Normal rate and regular rhythm.      Heart sounds: Normal heart sounds. No murmur heard.  Pulmonary:      Effort: Pulmonary effort is normal. No respiratory distress.      Breath sounds: Normal breath sounds.   Abdominal:      General: There is no distension.   Musculoskeletal:         General: No swelling or deformity. Normal range of motion.      Cervical back: Normal range of motion.      Right lower leg: No edema.      Left lower leg: No edema.   Skin:     General: Skin is warm and dry.      Findings: No rash.   Neurological:      Mental Status: She is alert and oriented to " teaching and feels comfortable with home feeding.   - vitamin D supplementation  - monitoring fluid status, feeding tolerance /readiness scores, and overall growth.       Respiratory: Currently stable in RA, no distress off LFNC. Occ SR desats and bradys.  Hx: Failure on admission requiring CPAP. Rapidly weaned to RA on 18. - required 1/2 lpm NC at 21% FiO2 w UTI - started due to desats and periodic breathing.   - Continue routine CR monitoring.     Cardiovascular:  Stable - good perfusion and BP. No murmur.  - Continue routine CR monitoring.     ID:  Rec'd sepsis eval on 18, due to incr freq of desats, but no true AB spells - no cardiorespiratory instability and no fever. UA and CBC reassuring. CRP <2.9 x2. BCx NGTD.  + UCx  No results for input(s): CULT in the last 168 hours.  - switched to Amp , s/p 7 day of ampicillin (completed )  - obtained renal ultrasound - asymmetric renal size, f/u in 2 weeks as outpatient.     Hx: Initial sepsis eval NTD, did not receive empiric antibiotic therapy, as lower risk of infection.    Hematology: Low risk for anemia with initial Hgb 20.4. ANC and plt count wnl on admission.   - Continue iron supplementation   - monitor serial Hgb/ferrtin levels with blood draws for repeat NMS at 30 do.    CNS:  No concerns. Initial OFC at ~12%tile with acceptable interval growth.  - Monitor clinical status and OFC weekly.     HCM:  Passed hearing and CCHD screens.  [place] borderline abnl for acylcarnitine and amino acidemia, o/w neg/wnl.   - Send repeat NMS at 14 (pending) & 30 days old.   - Input from OT.  - Continue standard NICU cares and family education plan.    Immunizations     Immunization History   Administered Date(s) Administered     Hep B, Peds or Adolescent 2018        Medications   No current facility-administered medications for this encounter.      Current Outpatient Prescriptions   Medication     pediatric  multivitamin with iron (POLY-VI-SOL WITH IRON) solution      Physical Exam   GENERAL: NAD, male infant  RESPIRATORY: breathing unlabored, no retractions.   CV: RRR, no murmur has been heard, appears pink and well perfused throughout.   ABDOMEN: appears soft, non-distended  CNS: Normal tone for GA. AFOF. MAEE.       Communications   Parents:  Updated during rounds via  8/26. Mother prefers communication in Surinamese.      PCPs:  Infant PCP: Sung Velez, PCP TBD  Maternal OB PCP: [facility] (no consistent provider)  Information for the patient's mother:  Rose Tan [[id number]]   [facility]  Delivering Provider: Dr. Suzy Maldonado   All updated via [employer] on 8/29/18.     Health Care Team:  Patient discussed with the care team.   A/P, imaging studies, laboratory data, medications and family situation reviewed.    Adilene Souza MD         Infant was discharged home 2018 at 13:30, <30 minutes spent in discharge coordination and exam.   person, place, and time.      Cranial Nerves: No cranial nerve deficit.      Coordination: Coordination normal.   Psychiatric:         Mood and Affect: Mood normal.         Behavior: Behavior normal.         Thought Content: Thought content normal.         Judgment: Judgment normal.         Assessment:         ICD-10-CM ICD-9-CM   1. Attention deficit hyperactivity disorder (ADHD), combined type  F90.2 314.01   2. 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 Recurrent cold sores  B00.1 054.9       Plan:       Attention deficit hyperactivity disorder (ADHD), combined type  Continue current medication(s).  Follow up in 3 months.  -     methylphenidate HCl (RITALIN) 20 MG tablet; Take 1 tablet (20 mg total) by mouth 2 (two) times daily.  Dispense: 60 tablet; Refill: 0  -     methylphenidate HCl (RITALIN) 20 MG tablet; Take 1 tablet (20 mg total) by mouth 2 (two) times daily.  Dispense: 60 tablet; Refill: 0  -     methylphenidate HCl (RITALIN) 20 MG tablet; Take 1 tablet (20 mg total) by mouth 2 (two) times daily.  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     Patient's Medications   New Prescriptions    No medications on file   Previous Medications    SERTRALINE (ZOLOFT) 50 MG TABLET    Take 1 tablet (50 mg total) by mouth once daily.    VALACYCLOVIR (VALTREX) 1000 MG TABLET    Take 2 tablets at onset of cold sore and repeat in 12 hours for 1 additional dose as need for cold sore outbreak   Modified Medications    Modified Medication Previous Medication    METHYLPHENIDATE HCL (RITALIN) 20 MG TABLET methylphenidate HCl (RITALIN) 20 MG tablet       Take 1 tablet (20 mg total) by mouth 2 (two) times daily.    Take 1 tablet (20 mg total) by mouth 2 (two) times daily.    METHYLPHENIDATE HCL (RITALIN) 20 MG TABLET  methylphenidate HCl (RITALIN) 20 MG tablet       Take 1 tablet (20 mg total) by mouth 2 (two) times daily.    Take 1 tablet (20 mg total) by mouth 2 (two) times daily.    METHYLPHENIDATE HCL (RITALIN) 20 MG TABLET methylphenidate HCl (RITALIN) 20 MG tablet       Take 1 tablet (20 mg total) by mouth 2 (two) times daily.    Take 1 tablet (20 mg total) by mouth 2 (two) times daily.   Discontinued Medications    METHYLPREDNISOLONE (MEDROL DOSEPACK) 4 MG TABLET    Take as directed on package       Past Medical History:   Diagnosis Date    Abnormal Pap smear of cervix     prior to being a pt     ADHD (attention deficit hyperactivity disorder)     Diagnosed by psychologist in [place] in the 10th grade and was on Ritalin from 10th to 12 grade and got off med; she then did not start back on ADHD medication until 2018 with Dr. Linda Pineda here in office    Allergy     Anxiety     Anxiety     Fever blister     HPV (human papilloma virus) infection        Past Surgical History:   Procedure Laterality Date    AUGMENTATION OF BREAST      BREAST SURGERY      breast augmentation in ; and then in  had an implant deflate that needed repair     SECTION      replacement of  breast implant       due to rupture     RHINOPLASTY         Family History   Adopted: Yes   Problem Relation Age of Onset    No Known Problems Daughter     ADD / ADHD Son     Strabismus Son        Social History     Socioeconomic History    Marital status:    Occupational History    Occupation: works curriculum advisor   Tobacco Use    Smoking status: Former     Packs/day: 0.25     Years: 5.00     Pack years: 1.25     Types: Cigarettes     Start date: 2003     Quit date: 2009     Years since quittin.6     Passive exposure: Past    Smokeless tobacco: Never   Substance and Sexual Activity    Alcohol use: Yes     Alcohol/week: 0.0 - 1.0 standard drinks     Comment: once a month - 1 to 2 drinks per  occasion    Drug use: No    Sexual activity: Yes     Partners: Male     Birth control/protection: Partner-Vasectomy   Other Topics Concern    Are you pregnant or think you may be? No    Breast-feeding No   Social History Narrative    .  2 children     Social Determinants of Health     Financial Resource Strain: Low Risk     Difficulty of Paying Living Expenses: Not hard at all   Food Insecurity: No Food Insecurity    Worried About Running Out of Food in the Last Year: Never true    Ran Out of Food in the Last Year: Never true   Transportation Needs: No Transportation Needs    Lack of Transportation (Medical): No    Lack of Transportation (Non-Medical): No   Physical Activity: Insufficiently Active    Days of Exercise per Week: 2 days    Minutes of Exercise per Session: 30 min   Stress: Stress Concern Present    Feeling of Stress : To some extent   Social Connections: Unknown    Frequency of Communication with Friends and Family: More than three times a week    Frequency of Social Gatherings with Friends and Family: Twice a week    Active Member of Clubs or Organizations: Yes    Attends Club or Organization Meetings: More than 4 times per year    Marital Status:    Housing Stability: Low Risk     Unable to Pay for Housing in the Last Year: No    Number of Places Lived in the Last Year: 1    Unstable Housing in the Last Year: No

## 2023-05-16 ENCOUNTER — TELEPHONE (OUTPATIENT)
Dept: OBSTETRICS AND GYNECOLOGY | Facility: CLINIC | Age: 43
End: 2023-05-16
Payer: COMMERCIAL

## 2023-05-16 DIAGNOSIS — Z12.31 BREAST CANCER SCREENING BY MAMMOGRAM: Primary | ICD-10-CM

## 2023-06-11 DIAGNOSIS — F43.0 STRESS REACTION CAUSING MIXED DISTURBANCE OF EMOTION AND CONDUCT: ICD-10-CM

## 2023-06-13 RX ORDER — SERTRALINE HYDROCHLORIDE 50 MG/1
50 TABLET, FILM COATED ORAL DAILY
Qty: 90 TABLET | Refills: 1 | Status: SHIPPED | OUTPATIENT
Start: 2023-06-13 | End: 2024-06-12

## 2023-07-11 ENCOUNTER — OFFICE VISIT (OUTPATIENT)
Dept: FAMILY MEDICINE | Facility: CLINIC | Age: 43
End: 2023-07-11
Payer: COMMERCIAL

## 2023-07-11 VITALS
WEIGHT: 132.81 LBS | OXYGEN SATURATION: 98 % | BODY MASS INDEX: 24.44 KG/M2 | SYSTOLIC BLOOD PRESSURE: 108 MMHG | DIASTOLIC BLOOD PRESSURE: 80 MMHG | HEIGHT: 62 IN | TEMPERATURE: 98 F | HEART RATE: 92 BPM

## 2023-07-11 DIAGNOSIS — Z13.220 SCREENING CHOLESTEROL LEVEL: ICD-10-CM

## 2023-07-11 DIAGNOSIS — F90.2 ATTENTION DEFICIT HYPERACTIVITY DISORDER (ADHD), COMBINED TYPE: ICD-10-CM

## 2023-07-11 DIAGNOSIS — Z13.0 SCREENING FOR DEFICIENCY ANEMIA: ICD-10-CM

## 2023-07-11 DIAGNOSIS — Z13.29 THYROID DISORDER SCREEN: ICD-10-CM

## 2023-07-11 DIAGNOSIS — Z00.00 ENCOUNTER FOR BLOOD TEST FOR ROUTINE GENERAL PHYSICAL EXAMINATION: ICD-10-CM

## 2023-07-11 DIAGNOSIS — B00.1 RECURRENT COLD SORES: ICD-10-CM

## 2023-07-11 DIAGNOSIS — Z13.1 DIABETES MELLITUS SCREENING: ICD-10-CM

## 2023-07-11 DIAGNOSIS — F43.0 STRESS REACTION CAUSING MIXED DISTURBANCE OF EMOTION AND CONDUCT: Primary | ICD-10-CM

## 2023-07-11 PROCEDURE — 1159F MED LIST DOCD IN RCRD: CPT | Mod: CPTII,S$GLB,, | Performed by: NURSE PRACTITIONER

## 2023-07-11 PROCEDURE — 1160F PR REVIEW ALL MEDS BY PRESCRIBER/CLIN PHARMACIST DOCUMENTED: ICD-10-PCS | Mod: CPTII,S$GLB,, | Performed by: NURSE PRACTITIONER

## 2023-07-11 PROCEDURE — 1159F PR MEDICATION LIST DOCUMENTED IN MEDICAL RECORD: ICD-10-PCS | Mod: CPTII,S$GLB,, | Performed by: NURSE PRACTITIONER

## 2023-07-11 PROCEDURE — 99214 OFFICE O/P EST MOD 30 MIN: CPT | Mod: S$GLB,,, | Performed by: NURSE PRACTITIONER

## 2023-07-11 PROCEDURE — 3008F PR BODY MASS INDEX (BMI) DOCUMENTED: ICD-10-PCS | Mod: CPTII,S$GLB,, | Performed by: NURSE PRACTITIONER

## 2023-07-11 PROCEDURE — 3079F PR MOST RECENT DIASTOLIC BLOOD PRESSURE 80-89 MM HG: ICD-10-PCS | Mod: CPTII,S$GLB,, | Performed by: NURSE PRACTITIONER

## 2023-07-11 PROCEDURE — 99999 PR PBB SHADOW E&M-EST. PATIENT-LVL III: CPT | Mod: PBBFAC,,, | Performed by: NURSE PRACTITIONER

## 2023-07-11 PROCEDURE — 3074F SYST BP LT 130 MM HG: CPT | Mod: CPTII,S$GLB,, | Performed by: NURSE PRACTITIONER

## 2023-07-11 PROCEDURE — 99999 PR PBB SHADOW E&M-EST. PATIENT-LVL III: ICD-10-PCS | Mod: PBBFAC,,, | Performed by: NURSE PRACTITIONER

## 2023-07-11 PROCEDURE — 1160F RVW MEDS BY RX/DR IN RCRD: CPT | Mod: CPTII,S$GLB,, | Performed by: NURSE PRACTITIONER

## 2023-07-11 PROCEDURE — 99214 PR OFFICE/OUTPT VISIT, EST, LEVL IV, 30-39 MIN: ICD-10-PCS | Mod: S$GLB,,, | Performed by: NURSE PRACTITIONER

## 2023-07-11 PROCEDURE — 3074F PR MOST RECENT SYSTOLIC BLOOD PRESSURE < 130 MM HG: ICD-10-PCS | Mod: CPTII,S$GLB,, | Performed by: NURSE PRACTITIONER

## 2023-07-11 PROCEDURE — 3008F BODY MASS INDEX DOCD: CPT | Mod: CPTII,S$GLB,, | Performed by: NURSE PRACTITIONER

## 2023-07-11 PROCEDURE — 3079F DIAST BP 80-89 MM HG: CPT | Mod: CPTII,S$GLB,, | Performed by: NURSE PRACTITIONER

## 2023-07-11 RX ORDER — METHYLPHENIDATE HYDROCHLORIDE 20 MG/1
20 TABLET ORAL 2 TIMES DAILY
Qty: 60 TABLET | Refills: 0 | Status: SHIPPED | OUTPATIENT
Start: 2023-07-11 | End: 2023-10-09 | Stop reason: SDUPTHER

## 2023-07-11 RX ORDER — METHYLPHENIDATE HYDROCHLORIDE 20 MG/1
20 TABLET ORAL 2 TIMES DAILY
Qty: 60 TABLET | Refills: 0 | Status: SHIPPED | OUTPATIENT
Start: 2023-09-08 | End: 2023-10-09 | Stop reason: SDUPTHER

## 2023-07-11 RX ORDER — METHYLPHENIDATE HYDROCHLORIDE 20 MG/1
20 TABLET ORAL 2 TIMES DAILY
Qty: 60 TABLET | Refills: 0 | Status: SHIPPED | OUTPATIENT
Start: 2023-08-10 | End: 2023-10-09 | Stop reason: SDUPTHER

## 2023-07-11 NOTE — PROGRESS NOTES
Subjective:       Patient ID: Karolyn Campbell is a 42 y.o. female.    Chief Complaint: ADHD (F/U Med Check)    HPI    Patient is a 42-year-old white female with ADHD, stress reaction with anxiety, history of HPV, recurrent cold sores, and chronic allergies that is here today for 3 month follow up     ADHD  1st evaluated for ADHD by psychologist in [place] in the 10th grade.    Diagnosed with ADHD predominantly inattentive and compulsive traits - NO access to records  Treated by Dr. Linda Pineda on 05/28/2018 - started Adderall XR 10 mg daily but not tolerated   Changed to Ritalin 10 mg twice daily on 07/13/2018.   Since February 2019, patient has been on the same dose of Ritalin 20 mg twice daily.   Able to focus and complete tasks without side effects.  She reports no problems with sleep.   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     Stress Reaction  Patient diagnosed in July 2021 with acute stress reaction with increased anxiety and started on Zoloft 25 mg daily.    Increased the Zoloft to 50 mg daily May 2022.   Increased dose is working well without side effects.      Recurrent cold sores  takes Valtrex as needed.      Review of Systems   Constitutional:  Negative for appetite change, chills, fatigue, fever and unexpected weight change.   HENT:  Negative for congestion, ear pain, mouth sores, nosebleeds, postnasal drip, rhinorrhea, sinus pressure, sneezing, sore throat, trouble swallowing and voice change.    Eyes:  Negative for photophobia, pain, discharge, redness, itching and visual disturbance.   Respiratory:  Negative for cough, chest tightness and shortness of breath.    Cardiovascular:  Negative for chest pain, palpitations and leg swelling.   Gastrointestinal:  Negative for abdominal pain, blood in stool, constipation, diarrhea, nausea and vomiting.   Genitourinary:  Negative for dysuria, frequency, hematuria and urgency.   Musculoskeletal:  Negative for arthralgias, back  "pain, joint swelling and myalgias.   Skin:  Negative for color change and rash.   Allergic/Immunologic: Negative for immunocompromised state.   Neurological:  Negative for dizziness, seizures, syncope, weakness and headaches.   Hematological:  Negative for adenopathy. Does not bruise/bleed easily.   Psychiatric/Behavioral:  Negative for agitation, dysphoric mood, sleep disturbance and suicidal ideas. The patient is not nervous/anxious.        Objective:     Vitals:    07/11/23 1634   BP: 108/80   BP Location: Right arm   Patient Position: Sitting   BP Method: Large (Manual)   Pulse: 92   Temp: 98.1 °F (36.7 °C)   TempSrc: Temporal   SpO2: 98%   Weight: 60.2 kg (132 lb 13.2 oz)   Height: 5' 2" (1.575 m)          Physical Exam  Constitutional:       General: She is not in acute distress.     Appearance: Normal appearance. She is well-developed and normal weight. She is not ill-appearing, toxic-appearing or diaphoretic.      Comments: Body mass index is 24.29 kg/m².   HENT:      Head: Normocephalic and atraumatic.   Eyes:      General: No scleral icterus.        Right eye: No discharge.         Left eye: No discharge.      Extraocular Movements: Extraocular movements intact.      Conjunctiva/sclera: Conjunctivae normal.   Neck:      Thyroid: No thyromegaly.      Trachea: No tracheal deviation.   Cardiovascular:      Rate and Rhythm: Normal rate and regular rhythm.      Heart sounds: Normal heart sounds. No murmur heard.  Pulmonary:      Effort: Pulmonary effort is normal. No respiratory distress.      Breath sounds: Normal breath sounds.   Abdominal:      General: There is no distension.   Musculoskeletal:         General: No swelling or deformity. Normal range of motion.      Cervical back: Normal range of motion.      Right lower leg: No edema.      Left lower leg: No edema.   Skin:     General: Skin is warm and dry.      Findings: No rash.   Neurological:      Mental Status: She is alert and oriented to person, " place, and time.      Cranial Nerves: No cranial nerve deficit.      Coordination: Coordination normal.   Psychiatric:         Mood and Affect: Mood normal.         Behavior: Behavior normal.         Thought Content: Thought content normal.         Judgment: Judgment normal.         Assessment:         ICD-10-CM ICD-9-CM   1. 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 Attention deficit hyperactivity disorder (ADHD), combined type  F90.2 314.01   3. Recurrent cold sores  B00.1 054.9   4. Encounter for blood test for routine general physical examination  Z00.00 V72.62   5. Screening for deficiency anemia  Z13.0 V78.1   6. Thyroid disorder screen  Z13.29 V77.0   7. Screening cholesterol level  Z13.220 V77.91   8. 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  Follow up in 3 months.    Attention deficit hyperactivity disorder (ADHD), combined type  Continue current medication(s).  Follow up in 3 months.  -     methylphenidate HCl (RITALIN) 20 MG tablet; Take 1 tablet (20 mg total) by mouth 2 (two) times daily.  Dispense: 60 tablet; Refill: 0  -     methylphenidate HCl (RITALIN) 20 MG tablet; Take 1 tablet (20 mg total) by mouth 2 (two) times daily.  Dispense: 60 tablet; Refill: 0  -     methylphenidate HCl (RITALIN) 20 MG tablet; Take 1 tablet (20 mg total) by mouth 2 (two) times daily.  Dispense: 60 tablet; Refill: 0    Recurrent cold sores  Valtrex prn    Encounter for blood test for routine general physical examination  -     CBC Auto Differential; Future; Expected date: 07/11/2023  -     Comprehensive Metabolic Panel; Future; Expected date: 07/11/2023  -     Hemoglobin A1C; Future; Expected date: 07/11/2023  -     Lipid Panel; Future; Expected date: 07/11/2023  -     TSH; Future; Expected date: 07/11/2023    Screening for deficiency anemia  -     CBC Auto  Differential; Future; Expected date: 07/11/2023    Thyroid disorder screen  -     TSH; Future; Expected date: 07/11/2023    Screening cholesterol level  -     Lipid Panel; Future; Expected date: 07/11/2023    Diabetes mellitus screening  -     Comprehensive Metabolic Panel; Future; Expected date: 07/11/2023  -     Hemoglobin A1C; Future; Expected date: 07/11/2023      Follow up in about 3 months (around 10/9/2023) for fasting labs and wellness exam.     Patient's Medications   New Prescriptions    No medications on file   Previous Medications    SERTRALINE (ZOLOFT) 50 MG TABLET    Take 1 tablet (50 mg total) by mouth once daily.    VALACYCLOVIR (VALTREX) 1000 MG TABLET    Take 2 tablets at onset of cold sore and repeat in 12 hours for 1 additional dose as need for cold sore outbreak   Modified Medications    Modified Medication Previous Medication    METHYLPHENIDATE HCL (RITALIN) 20 MG TABLET methylphenidate HCl (RITALIN) 20 MG tablet       Take 1 tablet (20 mg total) by mouth 2 (two) times daily.    Take 1 tablet (20 mg total) by mouth 2 (two) times daily.    METHYLPHENIDATE HCL (RITALIN) 20 MG TABLET methylphenidate HCl (RITALIN) 20 MG tablet       Take 1 tablet (20 mg total) by mouth 2 (two) times daily.    Take 1 tablet (20 mg total) by mouth 2 (two) times daily.    METHYLPHENIDATE HCL (RITALIN) 20 MG TABLET methylphenidate HCl (RITALIN) 20 MG tablet       Take 1 tablet (20 mg total) by mouth 2 (two) times daily.    Take 1 tablet (20 mg total) by mouth 2 (two) times daily.   Discontinued Medications    No medications on file       Past Medical History:   Diagnosis Date    Abnormal Pap smear of cervix 2000    prior to being a pt     ADHD (attention deficit hyperactivity disorder)     Diagnosed by psychologist in [place] in the 10th grade and was on Ritalin from 10th to 12 grade and got off med; she then did not start back on ADHD medication until 5/28/2018 with Dr. Linda Pineda here in office    Allergy      Anxiety     Anxiety     Fever blister     HPV (human papilloma virus) infection        Past Surgical History:   Procedure Laterality Date    AUGMENTATION OF BREAST      BREAST SURGERY      breast augmentation in ; and then in  had an implant deflate that needed repair     SECTION      replacement of  breast implant       due to rupture     RHINOPLASTY         Family History   Adopted: Yes   Problem Relation Age of Onset    No Known Problems Daughter     ADD / ADHD Son     Strabismus Son        Social History     Socioeconomic History    Marital status:    Occupational History    Occupation: works curriculum advisor   Tobacco Use    Smoking status: Former     Packs/day: 0.25     Years: 5.00     Pack years: 1.25     Types: Cigarettes     Start date: 2003     Quit date: 2009     Years since quittin.8     Passive exposure: Past    Smokeless tobacco: Never   Substance and Sexual Activity    Alcohol use: Yes     Alcohol/week: 0.0 - 1.0 standard drinks     Comment: once a month - 1 to 2 drinks per occasion    Drug use: No    Sexual activity: Yes     Partners: Male     Birth control/protection: Partner-Vasectomy   Other Topics Concern    Are you pregnant or think you may be? No    Breast-feeding No   Social History Narrative    .  2 children

## 2023-10-09 ENCOUNTER — OFFICE VISIT (OUTPATIENT)
Dept: FAMILY MEDICINE | Facility: CLINIC | Age: 43
End: 2023-10-09
Payer: COMMERCIAL

## 2023-10-09 VITALS
HEART RATE: 81 BPM | TEMPERATURE: 98 F | SYSTOLIC BLOOD PRESSURE: 106 MMHG | BODY MASS INDEX: 24.42 KG/M2 | WEIGHT: 132.69 LBS | DIASTOLIC BLOOD PRESSURE: 74 MMHG | OXYGEN SATURATION: 99 % | HEIGHT: 62 IN

## 2023-10-09 DIAGNOSIS — F90.2 ATTENTION DEFICIT HYPERACTIVITY DISORDER (ADHD), COMBINED TYPE: ICD-10-CM

## 2023-10-09 DIAGNOSIS — F43.0 STRESS REACTION CAUSING MIXED DISTURBANCE OF EMOTION AND CONDUCT: ICD-10-CM

## 2023-10-09 DIAGNOSIS — B00.1 RECURRENT COLD SORES: ICD-10-CM

## 2023-10-09 DIAGNOSIS — Z00.00 ANNUAL PHYSICAL EXAM: Primary | ICD-10-CM

## 2023-10-09 DIAGNOSIS — Z23 FLU VACCINE NEED: ICD-10-CM

## 2023-10-09 PROBLEM — D64.9 MILD ANEMIA: Status: RESOLVED | Noted: 2021-07-15 | Resolved: 2023-10-09

## 2023-10-09 PROCEDURE — 90471 FLU VACCINE (QUAD) GREATER THAN OR EQUAL TO 3YO PRESERVATIVE FREE IM: ICD-10-PCS | Mod: S$GLB,,, | Performed by: NURSE PRACTITIONER

## 2023-10-09 PROCEDURE — 3044F PR MOST RECENT HEMOGLOBIN A1C LEVEL <7.0%: ICD-10-PCS | Mod: CPTII,S$GLB,, | Performed by: NURSE PRACTITIONER

## 2023-10-09 PROCEDURE — 3074F SYST BP LT 130 MM HG: CPT | Mod: CPTII,S$GLB,, | Performed by: NURSE PRACTITIONER

## 2023-10-09 PROCEDURE — 90686 FLU VACCINE (QUAD) GREATER THAN OR EQUAL TO 3YO PRESERVATIVE FREE IM: ICD-10-PCS | Mod: S$GLB,,, | Performed by: NURSE PRACTITIONER

## 2023-10-09 PROCEDURE — 3074F PR MOST RECENT SYSTOLIC BLOOD PRESSURE < 130 MM HG: ICD-10-PCS | Mod: CPTII,S$GLB,, | Performed by: NURSE PRACTITIONER

## 2023-10-09 PROCEDURE — 3008F BODY MASS INDEX DOCD: CPT | Mod: CPTII,S$GLB,, | Performed by: NURSE PRACTITIONER

## 2023-10-09 PROCEDURE — 90471 IMMUNIZATION ADMIN: CPT | Mod: S$GLB,,, | Performed by: NURSE PRACTITIONER

## 2023-10-09 PROCEDURE — 99999 PR PBB SHADOW E&M-EST. PATIENT-LVL III: CPT | Mod: PBBFAC,,, | Performed by: NURSE PRACTITIONER

## 2023-10-09 PROCEDURE — 1159F MED LIST DOCD IN RCRD: CPT | Mod: CPTII,S$GLB,, | Performed by: NURSE PRACTITIONER

## 2023-10-09 PROCEDURE — 99396 PREV VISIT EST AGE 40-64: CPT | Mod: 25,S$GLB,, | Performed by: NURSE PRACTITIONER

## 2023-10-09 PROCEDURE — 99999 PR PBB SHADOW E&M-EST. PATIENT-LVL III: ICD-10-PCS | Mod: PBBFAC,,, | Performed by: NURSE PRACTITIONER

## 2023-10-09 PROCEDURE — 1160F PR REVIEW ALL MEDS BY PRESCRIBER/CLIN PHARMACIST DOCUMENTED: ICD-10-PCS | Mod: CPTII,S$GLB,, | Performed by: NURSE PRACTITIONER

## 2023-10-09 PROCEDURE — 3044F HG A1C LEVEL LT 7.0%: CPT | Mod: CPTII,S$GLB,, | Performed by: NURSE PRACTITIONER

## 2023-10-09 PROCEDURE — 3008F PR BODY MASS INDEX (BMI) DOCUMENTED: ICD-10-PCS | Mod: CPTII,S$GLB,, | Performed by: NURSE PRACTITIONER

## 2023-10-09 PROCEDURE — 1160F RVW MEDS BY RX/DR IN RCRD: CPT | Mod: CPTII,S$GLB,, | Performed by: NURSE PRACTITIONER

## 2023-10-09 PROCEDURE — 99396 PR PREVENTIVE VISIT,EST,40-64: ICD-10-PCS | Mod: 25,S$GLB,, | Performed by: NURSE PRACTITIONER

## 2023-10-09 PROCEDURE — 3078F DIAST BP <80 MM HG: CPT | Mod: CPTII,S$GLB,, | Performed by: NURSE PRACTITIONER

## 2023-10-09 PROCEDURE — 90686 IIV4 VACC NO PRSV 0.5 ML IM: CPT | Mod: S$GLB,,, | Performed by: NURSE PRACTITIONER

## 2023-10-09 PROCEDURE — 1159F PR MEDICATION LIST DOCUMENTED IN MEDICAL RECORD: ICD-10-PCS | Mod: CPTII,S$GLB,, | Performed by: NURSE PRACTITIONER

## 2023-10-09 PROCEDURE — 3078F PR MOST RECENT DIASTOLIC BLOOD PRESSURE < 80 MM HG: ICD-10-PCS | Mod: CPTII,S$GLB,, | Performed by: NURSE PRACTITIONER

## 2023-10-09 RX ORDER — METHYLPHENIDATE HYDROCHLORIDE 20 MG/1
20 TABLET ORAL 2 TIMES DAILY
Qty: 60 TABLET | Refills: 0 | Status: SHIPPED | OUTPATIENT
Start: 2023-10-13 | End: 2024-01-10 | Stop reason: SDUPTHER

## 2023-10-09 RX ORDER — METHYLPHENIDATE HYDROCHLORIDE 20 MG/1
20 TABLET ORAL 2 TIMES DAILY
Qty: 60 TABLET | Refills: 0 | Status: SHIPPED | OUTPATIENT
Start: 2023-12-12 | End: 2023-12-18 | Stop reason: SDUPTHER

## 2023-10-09 RX ORDER — METHYLPHENIDATE HYDROCHLORIDE 20 MG/1
20 TABLET ORAL 2 TIMES DAILY
Qty: 60 TABLET | Refills: 0 | Status: SHIPPED | OUTPATIENT
Start: 2023-11-13 | End: 2024-01-10 | Stop reason: SDUPTHER

## 2023-10-09 RX ORDER — DAPSONE 75 MG/G
GEL TOPICAL
COMMUNITY
End: 2023-10-09

## 2023-10-09 NOTE — PROGRESS NOTES
Subjective:       Patient ID: Karolyn Campbell is a 43 y.o. female.    Chief Complaint: Annual Exam    HPI    Patient is a 43-year-old white female with ADHD, stress reaction with anxiety, history of HPV, recurrent cold sores, and chronic allergies that is here today for ANNUAL physical exam with fasting lab results.    ADHD  1st evaluated for ADHD by psychologist in [place] in the 10th grade.    Diagnosed with ADHD predominantly inattentive and compulsive traits - NO access to records  Treated by Dr. Linda Pineda on 05/28/2018 - started Adderall XR 10 mg daily but not tolerated   Changed to Ritalin 10 mg twice daily on 07/13/2018.   Since February 2019, patient has been on the same dose of Ritalin 20 mg twice daily.   Able to focus and complete tasks without side effects.  She reports no problems with sleep.   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     Stress Reaction  Patient diagnosed in July 2021 with acute stress reaction with increased anxiety and started on Zoloft 25 mg daily.    Increased the Zoloft to 50 mg daily May 2022.   Increased dose is working well without side effects.      Recurrent cold sores  takes Valtrex as needed.    Wellness labs:  CBC WNL  CMP WNL  Cholesterol WNL  TSH WNL  HgbA1C 5.2%    Health Maintenance:  Flu vaccine today    Component      Latest Ref Rng 7/12/2021 7/11/2022 10/3/2023   WBC      3.90 - 12.70 K/uL 5.05  5.13  4.72    RBC      4.00 - 5.40 M/uL 3.84 (L)  4.76  4.64    Hemoglobin      12.0 - 16.0 g/dL 11.1 (L)  13.6  13.7    Hematocrit      37.0 - 48.5 % 34.8 (L)  42.8  41.2    MCV      82 - 98 fL 91  90  89    MCH      27.0 - 31.0 pg 28.9  28.6  29.5    MCHC      32.0 - 36.0 g/dL 31.9 (L)  31.8 (L)  33.3    RDW      11.5 - 14.5 % 12.7  13.9  13.4    Platelet Count      150 - 450 K/uL 330  294  293    MPV      9.2 - 12.9 fL 10.5  10.5  10.6    Gran # (ANC)      1.8 - 7.7 K/uL 2.1  2.6  2.3    Lymph #      1.0 - 4.8 K/uL 2.2  1.8  1.8    Mono  #      0.3 - 1.0 K/uL 0.5  0.4  0.5    Eos #      0.0 - 0.5 K/uL 0.2  0.2  0.2    Baso #      0.00 - 0.20 K/uL 0.05  0.04  0.04    Gran %      38.0 - 73.0 % 41.1  51.4  48.6    Lymph %      18.0 - 48.0 % 43.8  34.9  37.1    Mono %      4.0 - 15.0 % 10.7  8.4  9.5    Eosinophil %      0.0 - 8.0 % 3.2  4.3  3.8    Basophil %      0.0 - 1.9 % 1.0  0.8  0.8    Differential Method Automated  Automated  Automated    Sodium      136 - 145 mmol/L 140  141  141    Potassium      3.5 - 5.1 mmol/L 4.2  4.8  4.6    Chloride      95 - 110 mmol/L 107  106  105    CO2      23 - 29 mmol/L 26  25  26    Glucose      70 - 110 mg/dL 100  108  97    BUN      7 - 17 mg/dL 19 (H)  19 (H)  19 (H)    Creatinine      0.50 - 1.40 mg/dL 0.54  0.64  0.74    Calcium      8.7 - 10.5 mg/dL 9.4  9.0  9.2    PROTEIN TOTAL      6.0 - 8.4 g/dL 7.4  8.2  7.7    Albumin      3.5 - 5.2 g/dL 4.4  4.9  4.5    BILIRUBIN TOTAL      0.1 - 1.0 mg/dL 0.1  0.5  0.7    ALP      38 - 126 U/L 51  65  43    AST      15 - 46 U/L 27  31  23    ALT      10 - 44 U/L 16  21  16    Anion Gap      8 - 16 mmol/L 7 (L)  10  10    TSH      0.400 - 4.000 uIU/mL 3.460  3.550  1.520    Cholesterol Total      120 - 199 mg/dL 189  205 (H)  169    Triglycerides      30 - 150 mg/dL 81  67  47    HDL      40 - 75 mg/dL 76 (H)  82 (H)  60    LDL Cholesterol External      63.0 - 159.0 mg/dL 96.8  109.6  99.6    HDL/Cholesterol Ratio      20.0 - 50.0 % 40.2  40.0  35.5    Total Cholesterol/HDL Ratio      2.0 - 5.0  2.5  2.5  2.8    Non-HDL Cholesterol      mg/dL 113  123  109    Immature Granulocytes      0.0 - 0.5 % 0.2  0.2  0.2    Immature Grans (Abs)      0.00 - 0.04 K/uL 0.01  0.01  0.01    nRBC      0 /100 WBC 0  0  0    eGFR      >60 mL/min/1.73 m^2   >60.0    Hemoglobin A1C External      4.0 - 5.6 %   5.2    Estimated Avg Glucose      68 - 131 mg/dL   103       Legend:  (L) Low  (H) High    Review of Systems   Constitutional:  Negative for appetite change, chills, fatigue,  "fever and unexpected weight change.   HENT:  Negative for congestion, ear pain, mouth sores, nosebleeds, postnasal drip, rhinorrhea, sinus pressure, sneezing, sore throat, trouble swallowing and voice change.    Eyes:  Negative for photophobia, pain, discharge, redness, itching and visual disturbance.   Respiratory:  Negative for cough, chest tightness and shortness of breath.    Cardiovascular:  Negative for chest pain, palpitations and leg swelling.   Gastrointestinal:  Negative for abdominal pain, blood in stool, constipation, diarrhea, nausea and vomiting.   Genitourinary:  Negative for dysuria, frequency, hematuria and urgency.   Musculoskeletal:  Negative for arthralgias, back pain, joint swelling and myalgias.   Skin:  Negative for color change and rash.   Allergic/Immunologic: Negative for immunocompromised state.   Neurological:  Negative for dizziness, seizures, syncope, weakness and headaches.   Hematological:  Negative for adenopathy. Does not bruise/bleed easily.   Psychiatric/Behavioral:  Negative for agitation, dysphoric mood, sleep disturbance and suicidal ideas. The patient is not nervous/anxious.          Objective:     Vitals:    10/09/23 1646   BP: 106/74   BP Location: Right arm   Patient Position: Sitting   BP Method: Large (Manual)   Pulse: 81   Temp: 97.9 °F (36.6 °C)   TempSrc: Temporal   SpO2: 99%   Weight: 60.2 kg (132 lb 11.5 oz)   Height: 5' 2" (1.575 m)          Physical Exam  Constitutional:       General: She is not in acute distress.     Appearance: Normal appearance. She is well-developed and normal weight. She is not ill-appearing, toxic-appearing or diaphoretic.      Comments: Body mass index is 24.27 kg/m².     HENT:      Head: Normocephalic and atraumatic.   Eyes:      General: No scleral icterus.        Right eye: No discharge.         Left eye: No discharge.      Extraocular Movements: Extraocular movements intact.      Conjunctiva/sclera: Conjunctivae normal.   Neck:      " Thyroid: No thyromegaly.      Trachea: No tracheal deviation.   Cardiovascular:      Rate and Rhythm: Normal rate and regular rhythm.      Heart sounds: Normal heart sounds. No murmur heard.  Pulmonary:      Effort: Pulmonary effort is normal. No respiratory distress.      Breath sounds: Normal breath sounds.   Abdominal:      General: There is no distension.   Musculoskeletal:         General: No swelling or deformity. Normal range of motion.      Cervical back: Normal range of motion.      Right lower leg: No edema.      Left lower leg: No edema.   Skin:     General: Skin is warm and dry.      Findings: No rash.   Neurological:      Mental Status: She is alert and oriented to person, place, and time.      Cranial Nerves: No cranial nerve deficit.      Coordination: Coordination normal.   Psychiatric:         Mood and Affect: Mood normal.         Behavior: Behavior normal.         Thought Content: Thought content normal.         Judgment: Judgment normal.           Assessment:         ICD-10-CM ICD-9-CM   1. Annual physical exam  Z00.00 V70.0   2. Attention deficit hyperactivity disorder (ADHD), combined type  F90.2 314.01   3. 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 Recurrent cold sores  B00.1 054.9   5. Flu vaccine need  Z23 V04.81       Plan:       Annual physical exam  Health Maintenance Summary     Full History      Expand All  Collapse All    Scheduled - Mammogram  (Yearly)  Scheduled for 11/20/2023  11/15/2022  Mammo Digital Screening Bilat w/ Mark   11/10/2021  Mammo Digital Screening Bilat w/ Mark   10/06/2020  Mammo Digital Screening Bilat w/ Mark   09/03/2019  Mammo Digital Screening Bilat w/ Mark   06/20/2018  Mammo Previous   View More History     Postponed - COVID-19 Vaccine  (4 - 2023-24 season)  Postponed until 10/9/2024  11/30/2021  Imm Admin: COVID-19, MRNA, LN-S, PF (Pfizer) (Purple Cap)   03/17/2021  Imm Admin: COVID-19, MRNA, LN-S, PF (Pfizer) (Purple  Cap)   02/24/2021  Imm Admin: COVID-19, MRNA, LN-S, PF (Pfizer) (Purple Cap)     Cervical Cancer Screening  (HPV/Cotest - Every 3 Years)  Next due on 7/11/2025 07/11/2022  Multiple components of HPV High Risk Genotypes, PCR   07/11/2022  Liquid-Based Pap Smear, Screening   04/29/2019  Multiple components of HPV High Risk Genotypes, PCR   04/29/2019  Liquid-based pap smear, screening   01/26/2017  HPV DNA probe, amplified   View More History     TETANUS VACCINE  (Every 10 Years)  Next due on 7/15/2031  07/15/2021  Imm Admin: Tdap   06/19/2002  Imm Admin: Td (Adult), Unspecified Formulation     HIV Screening  Completed  07/08/2020  HIV 1/2 Ag/Ab (4th Gen)     Hepatitis C Screening  Completed  07/11/2022  Hepatitis C Ab component of Hepatitis C Antibody     Lipid Panel  Completed  10/03/2023  Cholesterol Total component of Lipid Panel   09/30/2022  POCT Lipid Profile w/ Glucose   07/11/2022  Cholesterol Total component of Lipid Panel   08/25/2021  POCT Lipid Profile w/ Glucose   07/12/2021  Cholesterol Total component of Lipid Panel   View More History     Influenza Vaccine  (Series Information)  Completed  10/09/2023  Imm Admin: Influenza - Quadrivalent - PF *Preferred* (6 months and older)   09/30/2022  Done - work   08/25/2021  Imm Admin: Influenza   09/28/2020  Imm Admin: Influenza - Quadrivalent - PF *Preferred* (6 months and older)   11/05/2019  Smart[employer]: WORKFLOW - HEALTHY PLANET - EXTERNAL DATA - EXTERNAL PROCEDURE DATE - INFLUENZA   View More History     Pneumococcal Vaccines (Age 0-64)  (Series Information)  Aged Out  No completion history exists for this topic.         Attention deficit hyperactivity disorder (ADHD), combined type  Controlled on present medication  Recheck in 3 months.  -     methylphenidate HCl (RITALIN) 20 MG tablet; Take 1 tablet (20 mg total) by mouth 2 (two) times daily.  Dispense: 60 tablet; Refill: 0  -     methylphenidate HCl (RITALIN) 20 MG tablet; Take 1 tablet (20 mg total) by  mouth 2 (two) times daily.  Dispense: 60 tablet; Refill: 0  -     methylphenidate HCl (RITALIN) 20 MG tablet; Take 1 tablet (20 mg total) by mouth 2 (two) times daily.  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    Recurrent cold sores  Valtrex prn    Flu vaccine need  -     Influenza - Quadrivalent *Preferred* (6 months+) (PF)      Follow up in about 3 months (around 1/9/2024) for med check.     Patient's Medications   New Prescriptions    No medications on file   Previous Medications    DAPSONE (ACZONE) 7.5 % GLWP        SERTRALINE (ZOLOFT) 50 MG TABLET    Take 1 tablet (50 mg total) by mouth once daily.    VALACYCLOVIR (VALTREX) 1000 MG TABLET    Take 2 tablets at onset of cold sore and repeat in 12 hours for 1 additional dose as need for cold sore outbreak   Modified Medications    Modified Medication Previous Medication    METHYLPHENIDATE HCL (RITALIN) 20 MG TABLET methylphenidate HCl (RITALIN) 20 MG tablet       Take 1 tablet (20 mg total) by mouth 2 (two) times daily.    Take 1 tablet (20 mg total) by mouth 2 (two) times daily.    METHYLPHENIDATE HCL (RITALIN) 20 MG TABLET methylphenidate HCl (RITALIN) 20 MG tablet       Take 1 tablet (20 mg total) by mouth 2 (two) times daily.    Take 1 tablet (20 mg total) by mouth 2 (two) times daily.    METHYLPHENIDATE HCL (RITALIN) 20 MG TABLET methylphenidate HCl (RITALIN) 20 MG tablet       Take 1 tablet (20 mg total) by mouth 2 (two) times daily.    Take 1 tablet (20 mg total) by mouth 2 (two) times daily.   Discontinued Medications    No medications on file       Past Medical History:   Diagnosis Date    Abnormal Pap smear of cervix 2000    prior to being a pt     ADHD (attention deficit hyperactivity disorder)     Diagnosed by psychologist in [place] in the 10th grade and was on Ritalin from 10th to 12 grade and got off med; she then did not start back on ADHD medication until  2018 with Dr. Linda Pineda here in office    Allergy     Anxiety     Anxiety     Fever blister     HPV (human papilloma virus) infection        Past Surgical History:   Procedure Laterality Date    AUGMENTATION OF BREAST      BREAST SURGERY      breast augmentation in ; and then in  had an implant deflate that needed repair     SECTION      replacement of  breast implant       due to rupture     RHINOPLASTY         Family History   Adopted: Yes   Problem Relation Age of Onset    No Known Problems Daughter     ADD / ADHD Son     Strabismus Son        Social History     Socioeconomic History    Marital status:    Occupational History    Occupation: works curriculum advisor   Tobacco Use    Smoking status: Former     Current packs/day: 0.00     Average packs/day: 0.3 packs/day for 6.7 years (1.7 ttl pk-yrs)     Types: Cigarettes     Start date: 2003     Quit date: 2009     Years since quittin.1     Passive exposure: Past    Smokeless tobacco: Never   Substance and Sexual Activity    Alcohol use: Yes     Alcohol/week: 0.0 - 1.0 standard drinks of alcohol     Comment: once a month - 1 to 2 drinks per occasion    Drug use: No    Sexual activity: Yes     Partners: Male     Birth control/protection: Partner-Vasectomy   Other Topics Concern    Are you pregnant or think you may be? No    Breast-feeding No   Social History Narrative    .  2 children     Social Determinants of Health     Financial Resource Strain: Low Risk  (2022)    Overall Financial Resource Strain (CARDIA)     Difficulty of Paying Living Expenses: Not hard at all   Food Insecurity: No Food Insecurity (2022)    Hunger Vital Sign     Worried About Running Out of Food in the Last Year: Never true     Ran Out of Food in the Last Year: Never true   Transportation Needs: No Transportation Needs (2022)    PRAPARE - Transportation     Lack of Transportation (Medical): No     Lack of  Transportation (Non-Medical): No   Physical Activity: Insufficiently Active (6/6/2022)    Exercise Vital Sign     Days of Exercise per Week: 2 days     Minutes of Exercise per Session: 30 min   Stress: Stress Concern Present (6/6/2022)    [employer] - Occupational Stress Questionnaire     Feeling of Stress : To some extent   Social Connections: Unknown (6/6/2022)    Social Connection and Isolation Panel [NHANES]     Frequency of Communication with Friends and Family: More than three times a week     Frequency of Social Gatherings with Friends and Family: Twice a week     Active Member of Clubs or Organizations: Yes     Attends Club or Organization Meetings: More than 4 times per year     Marital Status:    Housing Stability: Low Risk  (6/6/2022)    Housing Stability Vital Sign     Unable to Pay for Housing in the Last Year: No     Number of Places Lived in the Last Year: 1     Unstable Housing in the Last Year: No

## 2023-10-20 ENCOUNTER — CLINICAL SUPPORT (OUTPATIENT)
Dept: OTHER | Facility: CLINIC | Age: 43
End: 2023-10-20
Payer: COMMERCIAL

## 2023-10-20 DIAGNOSIS — Z00.8 ENCOUNTER FOR OTHER GENERAL EXAMINATION: ICD-10-CM

## 2023-10-21 VITALS
HEIGHT: 63 IN | SYSTOLIC BLOOD PRESSURE: 124 MMHG | BODY MASS INDEX: 22.68 KG/M2 | WEIGHT: 128 LBS | DIASTOLIC BLOOD PRESSURE: 67 MMHG

## 2023-10-21 LAB
GLUCOSE SERPL-MCNC: 80 MG/DL (ref 60–140)
HDLC SERPL-MCNC: 64 MG/DL
POC CHOLESTEROL, LDL (DOCK): 125 MG/DL
POC CHOLESTEROL, TOTAL: 202 MG/DL
TRIGL SERPL-MCNC: 74 MG/DL

## 2023-11-20 ENCOUNTER — HOSPITAL ENCOUNTER (OUTPATIENT)
Dept: RADIOLOGY | Facility: HOSPITAL | Age: 43
Discharge: HOME | End: 2023-11-20
Attending: OBSTETRICS & GYNECOLOGY
Payer: COMMERCIAL

## 2023-11-20 ENCOUNTER — OFFICE VISIT (OUTPATIENT)
Dept: OBSTETRICS AND GYNECOLOGY | Facility: CLINIC | Age: 43
End: 2023-11-20
Attending: OBSTETRICS & GYNECOLOGY
Payer: COMMERCIAL

## 2023-11-20 VITALS — HEIGHT: 62 IN | WEIGHT: 130 LBS | BODY MASS INDEX: 23.92 KG/M2

## 2023-11-20 VITALS
BODY MASS INDEX: 23.93 KG/M2 | DIASTOLIC BLOOD PRESSURE: 74 MMHG | WEIGHT: 130.06 LBS | HEIGHT: 62 IN | SYSTOLIC BLOOD PRESSURE: 120 MMHG

## 2023-11-20 DIAGNOSIS — Z01.419 ENCOUNTER FOR GYNECOLOGICAL EXAMINATION: Primary | ICD-10-CM

## 2023-11-20 DIAGNOSIS — Z12.31 BREAST CANCER SCREENING BY MAMMOGRAM: ICD-10-CM

## 2023-11-20 PROCEDURE — 77067 SCR MAMMO BI INCL CAD: CPT | Mod: 26,,, | Performed by: RADIOLOGY

## 2023-11-20 PROCEDURE — 3078F PR MOST RECENT DIASTOLIC BLOOD PRESSURE < 80 MM HG: ICD-10-PCS | Mod: CPTII,S$GLB,, | Performed by: OBSTETRICS & GYNECOLOGY

## 2023-11-20 PROCEDURE — 99999 PR PBB SHADOW E&M-EST. PATIENT-LVL III: CPT | Mod: PBBFAC,,, | Performed by: OBSTETRICS & GYNECOLOGY

## 2023-11-20 PROCEDURE — 77067 SCR MAMMO BI INCL CAD: CPT | Mod: TC

## 2023-11-20 PROCEDURE — 3008F BODY MASS INDEX DOCD: CPT | Mod: CPTII,S$GLB,, | Performed by: OBSTETRICS & GYNECOLOGY

## 2023-11-20 PROCEDURE — 77063 BREAST TOMOSYNTHESIS BI: CPT | Mod: 26,,, | Performed by: RADIOLOGY

## 2023-11-20 PROCEDURE — 99396 PREV VISIT EST AGE 40-64: CPT | Mod: S$GLB,,, | Performed by: OBSTETRICS & GYNECOLOGY

## 2023-11-20 PROCEDURE — 3008F PR BODY MASS INDEX (BMI) DOCUMENTED: ICD-10-PCS | Mod: CPTII,S$GLB,, | Performed by: OBSTETRICS & GYNECOLOGY

## 2023-11-20 PROCEDURE — 3074F SYST BP LT 130 MM HG: CPT | Mod: CPTII,S$GLB,, | Performed by: OBSTETRICS & GYNECOLOGY

## 2023-11-20 PROCEDURE — 1159F PR MEDICATION LIST DOCUMENTED IN MEDICAL RECORD: ICD-10-PCS | Mod: CPTII,S$GLB,, | Performed by: OBSTETRICS & GYNECOLOGY

## 2023-11-20 PROCEDURE — 99999 PR PBB SHADOW E&M-EST. PATIENT-LVL III: ICD-10-PCS | Mod: PBBFAC,,, | Performed by: OBSTETRICS & GYNECOLOGY

## 2023-11-20 PROCEDURE — 3044F HG A1C LEVEL LT 7.0%: CPT | Mod: CPTII,S$GLB,, | Performed by: OBSTETRICS & GYNECOLOGY

## 2023-11-20 PROCEDURE — 3078F DIAST BP <80 MM HG: CPT | Mod: CPTII,S$GLB,, | Performed by: OBSTETRICS & GYNECOLOGY

## 2023-11-20 PROCEDURE — 1159F MED LIST DOCD IN RCRD: CPT | Mod: CPTII,S$GLB,, | Performed by: OBSTETRICS & GYNECOLOGY

## 2023-11-20 PROCEDURE — 99396 PR PREVENTIVE VISIT,EST,40-64: ICD-10-PCS | Mod: S$GLB,,, | Performed by: OBSTETRICS & GYNECOLOGY

## 2023-11-20 PROCEDURE — 77063 MAMMO DIGITAL SCREENING BILAT WITH TOMO: ICD-10-PCS | Mod: 26,,, | Performed by: RADIOLOGY

## 2023-11-20 PROCEDURE — 3074F PR MOST RECENT SYSTOLIC BLOOD PRESSURE < 130 MM HG: ICD-10-PCS | Mod: CPTII,S$GLB,, | Performed by: OBSTETRICS & GYNECOLOGY

## 2023-11-20 PROCEDURE — 3044F PR MOST RECENT HEMOGLOBIN A1C LEVEL <7.0%: ICD-10-PCS | Mod: CPTII,S$GLB,, | Performed by: OBSTETRICS & GYNECOLOGY

## 2023-11-20 PROCEDURE — 77067 MAMMO DIGITAL SCREENING BILAT WITH TOMO: ICD-10-PCS | Mod: 26,,, | Performed by: RADIOLOGY

## 2023-11-20 NOTE — PROGRESS NOTES
LMP: Patient's last menstrual period was 10/31/2023 (exact date)..    Contraception: The current method of family planning is vasectomy  Meds per MD: none    Last Pap:   Last MMG:

## 2023-11-20 NOTE — PROGRESS NOTES
Chief Complaint: well woman exam  Annual Exam    She is established    Karolyn Campbell is a 43 y.o. female  presents for a well woman exam.    No c/o   Cycles are heavy for 2 days changing pads every 2 hrs on worse day- always this way No change Declines tx    ROS:  No abdominal pain. No vaginal discharge  No breast pain or masses, No rectal bleeding       Cycles:  regular and monthly  LMP: Patient's last menstrual period was 10/31/2023 (exact date)..    Contraception: The current method of family planning is vasectomy  Meds per MD: none    Last pap: 7/15/2022 Normal  HPV neg   Last MM/22  [facility] 1   [facility]  Last colonoscopy N/A rec after 44       Past Medical History:   Diagnosis Date    Abnormal Pap smear of cervix     prior to being a pt     ADHD (attention deficit hyperactivity disorder)     Diagnosed by psychologist in [place] in the 10th grade and was on Ritalin from 10th to 12 grade and got off med; she then did not start back on ADHD medication until 2018 with Dr. Linda Pineda here in office    Allergy     Anxiety     Anxiety     Fever blister     HPV (human papilloma virus) infection        Past Surgical History:   Procedure Laterality Date    AUGMENTATION OF BREAST      BREAST SURGERY      breast augmentation in ; and then in  had an implant deflate that needed repair     SECTION      replacement of  breast implant       due to rupture     RHINOPLASTY         OB History    Para Term  AB Living   5 5 3 0 0 2   SAB IAB Ectopic Multiple Live Births   0 0 0 0 2      # Outcome Date GA Lbr Germain/2nd Weight Sex Delivery Anes PTL Lv   5 Para 2014    F CS-LTranv   MAX   4 Para 2010    M CS-LTranv   MAX   3 Term            2 Term            1 Term                Family History   Adopted: Yes   Problem Relation Age of Onset    No Known Problems Daughter     ADD / ADHD Son     Strabismus Son        Social History     Tobacco Use    Smoking  "status: Former     Current packs/day: 0.00     Average packs/day: 0.3 packs/day for 6.7 years (1.7 ttl pk-yrs)     Types: Cigarettes     Start date: 2003     Quit date: 2009     Years since quittin.2     Passive exposure: Past    Smokeless tobacco: Never   Substance Use Topics    Alcohol use: Yes     Alcohol/week: 0.0 - 1.0 standard drinks of alcohol     Comment: once a month - 1 to 2 drinks per occasion    Drug use: No           Physical Exam:  /74 (Patient Position: Sitting)   Ht 5' 2" (1.575 m)   Wt 59 kg (130 lb 1.1 oz)   LMP 10/31/2023 (Exact Date)   BMI 23.79 kg/m²     APPEARANCE: Well nourished, well developed, in no acute distress.  AFFECT: WNL, alert and oriented x 3  SKIN: No acne or hirsutism  BREASTS: Symmetrical, Implants noted                      No nipple discharge.   No palpable masses bilaterally  NODES: No inguinal nor axillary LAD  ABDOMEN: soft Non tender Non distended No masses  PELVIC:   Normal external genitalia without lesions.  Normal hair distribution.   Adequate perineal body, normal urethral meatus.   No signif cystocele or rectocele.  Vagina moist and well rugated without lesions or discharge.    Cervix pink, without lesions, discharge or tenderness.     PAP not performed   Bimanual exam shows uterus to be normal size, regular, mobile and nontender.    Adnexa without masses or tenderness.    EXTREMITIES: No edema.        ASSESSMENT AND PLAN  Karolyn was seen today for annual exam.    Diagnoses and all orders for this visit:    Encounter for gynecological examination    PAP not due- UTD  [facility]      No follow-ups on file.     Patient was counseled today on A.C.S. Pap guidelines and recommendations for yearly pelvic exams, mammograms and monthly self breast exams; to see her PCP for other health maintenance.     Patient encouraged to register for portal and results will be sent via portal.       Health Maintenance   Topic Date Due    Mammogram  11/15/2023 "    TETANUS VACCINE  07/15/2031    Hepatitis C Screening  Completed    Lipid Panel  Completed

## 2023-11-29 ENCOUNTER — HOSPITAL ENCOUNTER (OUTPATIENT)
Dept: RADIOLOGY | Facility: HOSPITAL | Age: 43
Discharge: HOME | End: 2023-11-29
Attending: OBSTETRICS & GYNECOLOGY
Payer: COMMERCIAL

## 2023-11-29 DIAGNOSIS — R92.8 ABNORMAL MAMMOGRAM: ICD-10-CM

## 2023-11-29 PROCEDURE — 77061 BREAST TOMOSYNTHESIS UNI: CPT | Mod: 26,RT,, | Performed by: RADIOLOGY

## 2023-11-29 PROCEDURE — 77065 MAMMO DIGITAL DIAGNOSTIC RIGHT WITH TOMO: ICD-10-PCS | Mod: 26,RT,, | Performed by: RADIOLOGY

## 2023-11-29 PROCEDURE — 77061 MAMMO DIGITAL DIAGNOSTIC RIGHT WITH TOMO: ICD-10-PCS | Mod: 26,RT,, | Performed by: RADIOLOGY

## 2023-11-29 PROCEDURE — 77061 BREAST TOMOSYNTHESIS UNI: CPT | Mod: TC,RT

## 2023-11-29 PROCEDURE — 77065 DX MAMMO INCL CAD UNI: CPT | Mod: 26,RT,, | Performed by: RADIOLOGY

## 2023-12-18 ENCOUNTER — PATIENT MESSAGE (OUTPATIENT)
Dept: FAMILY MEDICINE | Facility: CLINIC | Age: 43
End: 2023-12-18
Payer: COMMERCIAL

## 2023-12-18 DIAGNOSIS — F90.2 ATTENTION DEFICIT HYPERACTIVITY DISORDER (ADHD), COMBINED TYPE: ICD-10-CM

## 2023-12-18 RX ORDER — METHYLPHENIDATE HYDROCHLORIDE 20 MG/1
20 TABLET ORAL 2 TIMES DAILY
Qty: 60 TABLET | Refills: 0 | Status: CANCELLED | OUTPATIENT
Start: 2023-12-18

## 2023-12-18 RX ORDER — METHYLPHENIDATE HYDROCHLORIDE 20 MG/1
20 TABLET ORAL 2 TIMES DAILY
Qty: 60 TABLET | Refills: 0 | Status: SHIPPED | OUTPATIENT
Start: 2023-12-18 | End: 2024-01-10 | Stop reason: SDUPTHER

## 2023-12-19 ENCOUNTER — PATIENT MESSAGE (OUTPATIENT)
Dept: FAMILY MEDICINE | Facility: CLINIC | Age: 43
End: 2023-12-19
Payer: COMMERCIAL

## 2024-01-10 ENCOUNTER — OFFICE VISIT (OUTPATIENT)
Dept: FAMILY MEDICINE | Facility: CLINIC | Age: 44
End: 2024-01-10
Payer: COMMERCIAL

## 2024-01-10 VITALS
WEIGHT: 133.06 LBS | SYSTOLIC BLOOD PRESSURE: 122 MMHG | DIASTOLIC BLOOD PRESSURE: 80 MMHG | TEMPERATURE: 98 F | BODY MASS INDEX: 24.48 KG/M2 | HEIGHT: 62 IN | OXYGEN SATURATION: 98 % | HEART RATE: 59 BPM

## 2024-01-10 DIAGNOSIS — F90.2 ATTENTION DEFICIT HYPERACTIVITY DISORDER (ADHD), COMBINED TYPE: ICD-10-CM

## 2024-01-10 DIAGNOSIS — F43.0 STRESS REACTION CAUSING MIXED DISTURBANCE OF EMOTION AND CONDUCT: Primary | ICD-10-CM

## 2024-01-10 DIAGNOSIS — B00.1 RECURRENT COLD SORES: ICD-10-CM

## 2024-01-10 PROCEDURE — 99214 OFFICE O/P EST MOD 30 MIN: CPT | Mod: S$GLB,,, | Performed by: NURSE PRACTITIONER

## 2024-01-10 PROCEDURE — 1160F RVW MEDS BY RX/DR IN RCRD: CPT | Mod: CPTII,S$GLB,, | Performed by: NURSE PRACTITIONER

## 2024-01-10 PROCEDURE — 3008F BODY MASS INDEX DOCD: CPT | Mod: CPTII,S$GLB,, | Performed by: NURSE PRACTITIONER

## 2024-01-10 PROCEDURE — 3074F SYST BP LT 130 MM HG: CPT | Mod: CPTII,S$GLB,, | Performed by: NURSE PRACTITIONER

## 2024-01-10 PROCEDURE — 1159F MED LIST DOCD IN RCRD: CPT | Mod: CPTII,S$GLB,, | Performed by: NURSE PRACTITIONER

## 2024-01-10 PROCEDURE — 99999 PR PBB SHADOW E&M-EST. PATIENT-LVL IV: CPT | Mod: PBBFAC,,, | Performed by: NURSE PRACTITIONER

## 2024-01-10 PROCEDURE — 3079F DIAST BP 80-89 MM HG: CPT | Mod: CPTII,S$GLB,, | Performed by: NURSE PRACTITIONER

## 2024-01-10 RX ORDER — METHYLPHENIDATE HYDROCHLORIDE 20 MG/1
20 TABLET ORAL 2 TIMES DAILY
Qty: 60 TABLET | Refills: 0 | Status: SHIPPED | OUTPATIENT
Start: 2024-01-17

## 2024-01-10 RX ORDER — METHYLPHENIDATE HYDROCHLORIDE 20 MG/1
20 TABLET ORAL 2 TIMES DAILY
Qty: 60 TABLET | Refills: 0 | Status: SHIPPED | OUTPATIENT
Start: 2024-02-15

## 2024-01-10 RX ORDER — METHYLPHENIDATE HYDROCHLORIDE 20 MG/1
20 TABLET ORAL 2 TIMES DAILY
Qty: 60 TABLET | Refills: 0 | Status: SHIPPED | OUTPATIENT
Start: 2024-03-15

## 2024-01-10 NOTE — PROGRESS NOTES
"Subjective:       Patient ID: Karolyn Campbell is a 43 y.o. female.    Chief Complaint: ADHD (3 months Med Check)    HPI    Patient is a 43-year-old white female with ADHD, stress reaction with anxiety, history of HPV, recurrent cold sores, and chronic allergies that is here today for 3 month follow up.     ADHD  1st evaluated for ADHD by psychologist in [place] in the 10th grade.    Diagnosed with ADHD predominantly inattentive and compulsive traits - NO access to records  Treated by Dr. Linda Pineda on 05/28/2018 - started Adderall XR 10 mg daily but not tolerated   Changed to Ritalin 10 mg twice daily on 07/13/2018.   Since February 2019, patient has been on the same dose of Ritalin 20 mg twice daily.   Able to focus and complete tasks without side effects.  She reports no problems with sleep.   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  3 post-dated printed prescriptions given to patient due to medication shortage  Follow up in 3 months.     Stress Reaction  Patient diagnosed in July 2021 with acute stress reaction with increased anxiety and started on Zoloft 25 mg daily.    Increased the Zoloft to 50 mg daily May 2022.   Increased dose is working well without side effects.      Recurrent cold sores  takes Valtrex as needed.      Review of Systems   HENT: Negative.     Respiratory: Negative.     Cardiovascular: Negative.    Gastrointestinal: Negative.          Objective:     Vitals:    01/10/24 1633   BP: 122/80   BP Location: Left arm   Patient Position: Sitting   BP Method: Large (Manual)   Pulse: (!) 59   Temp: 97.9 °F (36.6 °C)   TempSrc: Temporal   SpO2: 98%   Weight: 60.3 kg (133 lb 0.8 oz)   Height: 5' 2" (1.575 m)          Physical Exam  Constitutional:       General: She is not in acute distress.     Appearance: Normal appearance. She is normal weight. She is not toxic-appearing.      Comments: Body mass index is 24.33 kg/m².     Cardiovascular:      Rate and Rhythm: Normal " rate and regular rhythm.      Heart sounds: Normal heart sounds.   Pulmonary:      Effort: Pulmonary effort is normal. No respiratory distress.   Abdominal:      General: There is no distension.   Neurological:      Mental Status: She is alert and oriented to person, place, and time.   Psychiatric:         Mood and Affect: Mood normal.         Behavior: Behavior normal.         Thought Content: Thought content normal.         Judgment: Judgment normal.           Assessment:         ICD-10-CM ICD-9-CM   1. 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 Attention deficit hyperactivity disorder (ADHD), combined type  F90.2 314.01   3. Recurrent cold sores  B00.1 054.9       Plan:       1. 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    Increased the Zoloft to 50 mg daily May 2022.   Increased dose is working well without side effects.       2. Attention deficit hyperactivity disorder (ADHD), combined type  Overview:  1st evaluated for ADHD by psychologist in [place] in the 10th grade.    Diagnosed with ADHD predominantly inattentive and compulsive traits - NO access to records  Treated by Dr. Linda Pineda on 05/28/2018 - started Adderall XR 10 mg daily but not tolerated   Changed to Ritalin 10 mg twice daily on 07/13/2018.   Since February 2019, patient has been on the same dose of Ritalin 20 mg twice daily.   Able to focus and complete tasks without side effects.  She reports no problems with sleep.   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  3 post-dated printed prescriptions given to patient due to medication shortage  Follow up in 3 months.    Orders:  -     methylphenidate HCl (RITALIN) 20 MG tablet; Take 1 tablet (20 mg total) by mouth 2 (two) times daily.  Dispense: 60 tablet; Refill: 0  -      methylphenidate HCl (RITALIN) 20 MG tablet; Take 1 tablet (20 mg total) by mouth 2 (two) times daily.  Dispense: 60 tablet; Refill: 0  -     methylphenidate HCl (RITALIN) 20 MG tablet; Take 1 tablet (20 mg total) by mouth 2 (two) times daily.  Dispense: 60 tablet; Refill: 0    3. Recurrent cold sores  Overview:  takes Valtrex as needed.         Follow up in about 3 months (around 4/10/2024) for med check.     Patient's Medications   New Prescriptions    No medications on file   Previous Medications    SERTRALINE (ZOLOFT) 50 MG TABLET    Take 1 tablet (50 mg total) by mouth once daily.    VALACYCLOVIR (VALTREX) 1000 MG TABLET    Take 2 tablets at onset of cold sore and repeat in 12 hours for 1 additional dose as need for cold sore outbreak   Modified Medications    Modified Medication Previous Medication    METHYLPHENIDATE HCL (RITALIN) 20 MG TABLET methylphenidate HCl (RITALIN) 20 MG tablet       Take 1 tablet (20 mg total) by mouth 2 (two) times daily.    Take 1 tablet (20 mg total) by mouth 2 (two) times daily.    METHYLPHENIDATE HCL (RITALIN) 20 MG TABLET methylphenidate HCl (RITALIN) 20 MG tablet       Take 1 tablet (20 mg total) by mouth 2 (two) times daily.    Take 1 tablet (20 mg total) by mouth 2 (two) times daily.    METHYLPHENIDATE HCL (RITALIN) 20 MG TABLET methylphenidate HCl (RITALIN) 20 MG tablet       Take 1 tablet (20 mg total) by mouth 2 (two) times daily.    Take 1 tablet (20 mg total) by mouth 2 (two) times daily.   Discontinued Medications    No medications on file       Past Medical History:   Diagnosis Date    Abnormal Pap smear of cervix 2000    prior to being a pt     ADHD (attention deficit hyperactivity disorder)     Diagnosed by psychologist in [place] in the 10th grade and was on Ritalin from 10th to 12 grade and got off med; she then did not start back on ADHD medication until 5/28/2018 with Dr. Linda Pineda here in office    Allergy     Anxiety     Anxiety     Fever blister     HPV  (human papilloma virus) infection        Past Surgical History:   Procedure Laterality Date    AUGMENTATION OF BREAST      BREAST SURGERY  2003    breast augmentation in ; and then in  had an implant deflate that needed repair     SECTION      replacement of  breast implant       due to rupture     RHINOPLASTY         Family History   Adopted: Yes   Problem Relation Age of Onset    No Known Problems Daughter     ADD / ADHD Son     Strabismus Son        Social History     Socioeconomic History    Marital status:    Occupational History    Occupation: works curriculum advisor   Tobacco Use    Smoking status: Former     Current packs/day: 0.00     Average packs/day: 0.3 packs/day for 6.7 years (1.7 ttl pk-yrs)     Types: Cigarettes     Start date: 2003     Quit date: 2009     Years since quittin.3     Passive exposure: Past    Smokeless tobacco: Never   Substance and Sexual Activity    Alcohol use: Yes     Alcohol/week: 0.0 - 1.0 standard drinks of alcohol     Comment: once a month - 1 to 2 drinks per occasion    Drug use: No    Sexual activity: Yes     Partners: Male     Birth control/protection: Partner-Vasectomy   Other Topics Concern    Are you pregnant or think you may be? No    Breast-feeding No   Social History Narrative    .  2 children     Social Determinants of Health     Financial Resource Strain: Low Risk  (2022)    Overall Financial Resource Strain (CARDIA)     Difficulty of Paying Living Expenses: Not hard at all   Food Insecurity: No Food Insecurity (2022)    Hunger Vital Sign     Worried About Running Out of Food in the Last Year: Never true     Ran Out of Food in the Last Year: Never true   Transportation Needs: No Transportation Needs (2022)    PRAPARE - Transportation     Lack of Transportation (Medical): No     Lack of Transportation (Non-Medical): No   Physical Activity: Insufficiently Active (2022)    Exercise Vital Sign      Days of Exercise per Week: 2 days     Minutes of Exercise per Session: 30 min   Stress: Stress Concern Present (6/6/2022)    [employer] - Occupational Stress Questionnaire     Feeling of Stress : To some extent   Social Connections: Unknown (6/6/2022)    Social Connection and Isolation Panel [NHANES]     Frequency of Communication with Friends and Family: More than three times a week     Frequency of Social Gatherings with Friends and Family: Twice a week     Active Member of Clubs or Organizations: Yes     Attends Club or Organization Meetings: More than 4 times per year     Marital Status:    Housing Stability: Low Risk  (6/6/2022)    Housing Stability Vital Sign     Unable to Pay for Housing in the Last Year: No     Number of Places Lived in the Last Year: 1     Unstable Housing in the Last Year: No

## 2024-01-19 ENCOUNTER — OFFICE VISIT (OUTPATIENT)
Dept: URGENT CARE | Facility: CLINIC | Age: 44
End: 2024-01-19
Payer: COMMERCIAL

## 2024-01-19 VITALS
SYSTOLIC BLOOD PRESSURE: 127 MMHG | RESPIRATION RATE: 16 BRPM | WEIGHT: 130 LBS | BODY MASS INDEX: 23.92 KG/M2 | HEART RATE: 87 BPM | DIASTOLIC BLOOD PRESSURE: 78 MMHG | TEMPERATURE: 98 F | OXYGEN SATURATION: 100 % | HEIGHT: 62 IN

## 2024-01-19 DIAGNOSIS — J10.1 INFLUENZA A: Primary | ICD-10-CM

## 2024-01-19 DIAGNOSIS — R09.81 NASAL CONGESTION: ICD-10-CM

## 2024-01-19 DIAGNOSIS — F90.2 ATTENTION DEFICIT HYPERACTIVITY DISORDER (ADHD), COMBINED TYPE: ICD-10-CM

## 2024-01-19 DIAGNOSIS — R05.1 ACUTE COUGH: ICD-10-CM

## 2024-01-19 LAB
CTP QC/QA: YES
CTP QC/QA: YES
POC MOLECULAR INFLUENZA A AGN: POSITIVE
POC MOLECULAR INFLUENZA B AGN: NEGATIVE
SARS-COV-2 AG RESP QL IA.RAPID: NEGATIVE

## 2024-01-19 PROCEDURE — 87811 SARS-COV-2 COVID19 W/OPTIC: CPT | Mod: QW,S$GLB,, | Performed by: PHYSICIAN ASSISTANT

## 2024-01-19 PROCEDURE — 99213 OFFICE O/P EST LOW 20 MIN: CPT | Mod: S$GLB,,, | Performed by: PHYSICIAN ASSISTANT

## 2024-01-19 PROCEDURE — 87502 INFLUENZA DNA AMP PROBE: CPT | Mod: QW,S$GLB,, | Performed by: PHYSICIAN ASSISTANT

## 2024-01-19 RX ORDER — OSELTAMIVIR PHOSPHATE 75 MG/1
75 CAPSULE ORAL 2 TIMES DAILY
Qty: 10 CAPSULE | Refills: 0 | Status: SHIPPED | OUTPATIENT
Start: 2024-01-19 | End: 2024-01-24

## 2024-01-19 NOTE — PATIENT INSTRUCTIONS
If not allergic,take tylenol (acetominophen) for fever control, chills, or body aches every 4 hours. Do not exceed 4000 mg/ day.If not allergic, take Motrin (Ibuprofen) every 4 hours for fever, chills, pain or inflammation. Do not exceed 2400 mg/day. You can alternate taking tylenol and motrin.     You must understand that you've received an Urgent Care treatment only and that you may be released before all your medical problems are known or treated. You, the patient, will arrange for follow up care as instructed.      Follow up with your PCP or specialty clinic as instructed in the next 2-3 days if not improved or as needed. You can call [phone] to schedule an appointment with appropriate provider.      If you condition worsens, we recommend that you receive another evaluation at the emergency room immediately or contact your primary medical clinic's after hours call service to discuss your concerns.      Please return here or go to the Emergency Department for any concerns or worsening condition.      If you were prescribed a narcotic or controlled substance, do not drive or operate heavy equipment or machinery while taking these medications.

## 2024-01-19 NOTE — PROGRESS NOTES
"Subjective:      Patient ID: Karolyn Campbell is a 43 y.o. female.    Vitals:  height is 5' 2" (1.575 m) and weight is 59 kg (130 lb). Her oral temperature is 98.4 °F (36.9 °C). Her blood pressure is 127/78 and her pulse is 87. Her respiration is 16 and oxygen saturation is 100%.     Chief Complaint: Sinus Problem    Pt complains of nasal congestion, post nasal drip, headaches and diarrhea that started yesterday. Pt took a home covid test 2 days ago.     Patient provider note starts here:  Patient presents with complaints of URI like symptoms for the past 2 days. Reports a tickle in her throat, nasal congestion, back pain, headaches (described as sinus pressure). She has been taking Tylenol for the symptoms. Many ill contacts at work recently. Denies fevers.       Sinus Problem  This is a new problem. The current episode started yesterday. The problem has been gradually worsening since onset. There has been no fever. Her pain is at a severity of 7/10 (head). The pain is moderate. Associated symptoms include congestion, headaches, sinus pressure and a sore throat. Pertinent negatives include no coughing, neck pain or shortness of breath. Treatments tried: migraine meds. The treatment provided mild relief.       Constitution: Positive for fatigue. Negative for fever.   HENT:  Positive for congestion, postnasal drip, sinus pressure and sore throat. Negative for sinus pain.         Tickle in throat   Neck: Negative for neck pain.   Cardiovascular:  Negative for chest pain, palpitations and sob on exertion.   Respiratory:  Negative for chest tightness, cough, shortness of breath and wheezing.    Gastrointestinal:  Positive for diarrhea. Negative for abdominal pain, nausea and vomiting.   Musculoskeletal:  Positive for muscle ache.   Skin:  Negative for color change and wound.   Neurological:  Positive for headaches. Negative for numbness and tingling.      Objective:     Physical Exam   Constitutional: She is oriented to " From: Eva Ribera  To: Marybel Tinoco  Sent: 5/16/2023 9:56 PM CDT  Subject: appointment     I have an appointment on Thursday for my pap exam re check and i have a weird question, I was just wondering if you would still be able to do it if i just got my period. or if i would have to reschedule i wasn’t sure.    person, place, and time. She appears well-developed. She is cooperative.  Non-toxic appearance. She does not appear ill. No distress.   HENT:   Head: Normocephalic and atraumatic.   Ears:   Right Ear: Hearing, tympanic membrane, external ear and ear canal normal.   Left Ear: Hearing, tympanic membrane, external ear and ear canal normal.   Nose: Congestion present. No mucosal edema, rhinorrhea or nasal deformity. No epistaxis. Right sinus exhibits no maxillary sinus tenderness and no frontal sinus tenderness. Left sinus exhibits no maxillary sinus tenderness and no frontal sinus tenderness.   Mouth/Throat: Uvula is midline and mucous membranes are normal. No trismus in the jaw. Normal dentition. No uvula swelling. Posterior oropharyngeal erythema present. No oropharyngeal exudate or posterior oropharyngeal edema.   Eyes: Conjunctivae and lids are normal. No scleral icterus.   Neck: Trachea normal and phonation normal. Neck supple. No edema present. No erythema present. No neck rigidity present.   Cardiovascular: Normal rate, regular rhythm, normal heart sounds and normal pulses.   Pulmonary/Chest: Effort normal and breath sounds normal. No respiratory distress. She has no decreased breath sounds. She has no wheezes. She has no rhonchi.   Abdominal: Normal appearance.   Musculoskeletal: Normal range of motion.         General: No deformity. Normal range of motion.   Lymphadenopathy:     She has cervical adenopathy.   Neurological: She is alert and oriented to person, place, and time. She exhibits normal muscle tone. Coordination normal.   Skin: Skin is warm, dry, intact, not diaphoretic and not pale.   Psychiatric: Her speech is normal and behavior is normal. Judgment and thought content normal.   Nursing note and vitals reviewed.      Assessment:     1. Influenza A    2. Nasal congestion    3. Attention deficit hyperactivity disorder (ADHD), combined type    4. Acute cough      Results for orders placed or performed  in visit on 01/19/24   SARS Coronavirus 2 Antigen, POCT Manual Read   Result Value Ref Range    SARS Coronavirus 2 Antigen Negative Negative     Acceptable Yes    POCT Influenza A/B MOLECULAR   Result Value Ref Range    POC Molecular Influenza A Ag Positive (A) Negative, Not Reported    POC Molecular Influenza B Ag Negative Negative, Not Reported     Acceptable Yes        Plan:       Influenza A  -     oseltamivir (TAMIFLU) 75 MG capsule; Take 1 capsule (75 mg total) by mouth 2 (two) times daily. for 5 days  Dispense: 10 capsule; Refill: 0    Nasal congestion  -     SARS Coronavirus 2 Antigen, POCT Manual Read    Attention deficit hyperactivity disorder (ADHD), combined type    Acute cough  -     POCT Influenza A/B MOLECULAR          Medical Decision Making:   History:   Old Medical Records: I decided to obtain old medical records.  Clinical Tests:   Lab Tests: Ordered and Reviewed  Urgent Care Management:  A. Problem List:   -Acute: URI with cough and congestion   -Chronic: ADHD   B. Differential diagnosis: viral vs bacterial URI, pharyngitis, otitis, COVID 19, influenza, pneumonia  C. Diagnostic Testing Ordered: Flu, COVID   D. Diagnostic Testing Considered: None  E. Independent Historians: None  F. Urgent Care Midlevel Independent Results Interpretation: COVID negative, Flu A+  G. Radiology:  H. Review of Previous Medical Records:  I. Home Medications Reviewed  J. Social Determinants of Health considered  K. Medical Decision Making and Disposition: Patient presents with complaints of URI like symptoms for the past 2 days. On exam, she is afebrile and nontoxic appearing. Lungs CTAB, vitals are stable. COVID is negative. Flu is positive. The risks and benefits of Tamiflu were discussed and patient was unsure if she wanted to start the treatment or not, however, I did send to her pharmacy in the event that she chooses to take it. Also advised to continue symptomatic treatment. ED  precautions discussed, she verbalized understanding and agreed with plan.          Patient Instructions   If not allergic,take tylenol (acetominophen) for fever control, chills, or body aches every 4 hours. Do not exceed 4000 mg/ day.If not allergic, take Motrin (Ibuprofen) every 4 hours for fever, chills, pain or inflammation. Do not exceed 2400 mg/day. You can alternate taking tylenol and motrin.     You must understand that you've received an Urgent Care treatment only and that you may be released before all your medical problems are known or treated. You, the patient, will arrange for follow up care as instructed.      Follow up with your PCP or specialty clinic as instructed in the next 2-3 days if not improved or as needed. You can call [phone] to schedule an appointment with appropriate provider.      If you condition worsens, we recommend that you receive another evaluation at the emergency room immediately or contact your primary medical clinic's after hours call service to discuss your concerns.      Please return here or go to the Emergency Department for any concerns or worsening condition.      If you were prescribed a narcotic or controlled substance, do not drive or operate heavy equipment or machinery while taking these medications.

## 2024-04-08 ENCOUNTER — OFFICE VISIT (OUTPATIENT)
Dept: FAMILY MEDICINE | Facility: CLINIC | Age: 44
End: 2024-04-08
Payer: COMMERCIAL

## 2024-04-08 VITALS
OXYGEN SATURATION: 99 % | HEART RATE: 88 BPM | TEMPERATURE: 98 F | DIASTOLIC BLOOD PRESSURE: 78 MMHG | WEIGHT: 130.75 LBS | BODY MASS INDEX: 24.06 KG/M2 | HEIGHT: 62 IN | SYSTOLIC BLOOD PRESSURE: 118 MMHG

## 2024-04-08 DIAGNOSIS — F43.0 STRESS REACTION CAUSING MIXED DISTURBANCE OF EMOTION AND CONDUCT: ICD-10-CM

## 2024-04-08 DIAGNOSIS — H69.92 DYSFUNCTION OF LEFT EUSTACHIAN TUBE: ICD-10-CM

## 2024-04-08 DIAGNOSIS — B00.1 RECURRENT COLD SORES: ICD-10-CM

## 2024-04-08 DIAGNOSIS — F90.2 ATTENTION DEFICIT HYPERACTIVITY DISORDER (ADHD), COMBINED TYPE: Primary | ICD-10-CM

## 2024-04-08 PROCEDURE — 3078F DIAST BP <80 MM HG: CPT | Mod: CPTII,S$GLB,, | Performed by: NURSE PRACTITIONER

## 2024-04-08 PROCEDURE — 3008F BODY MASS INDEX DOCD: CPT | Mod: CPTII,S$GLB,, | Performed by: NURSE PRACTITIONER

## 2024-04-08 PROCEDURE — 1160F RVW MEDS BY RX/DR IN RCRD: CPT | Mod: CPTII,S$GLB,, | Performed by: NURSE PRACTITIONER

## 2024-04-08 PROCEDURE — 1159F MED LIST DOCD IN RCRD: CPT | Mod: CPTII,S$GLB,, | Performed by: NURSE PRACTITIONER

## 2024-04-08 PROCEDURE — 99999 PR PBB SHADOW E&M-EST. PATIENT-LVL III: CPT | Mod: PBBFAC,,, | Performed by: NURSE PRACTITIONER

## 2024-04-08 PROCEDURE — 99214 OFFICE O/P EST MOD 30 MIN: CPT | Mod: S$GLB,,, | Performed by: NURSE PRACTITIONER

## 2024-04-08 PROCEDURE — 3074F SYST BP LT 130 MM HG: CPT | Mod: CPTII,S$GLB,, | Performed by: NURSE PRACTITIONER

## 2024-04-08 RX ORDER — METHYLPHENIDATE HYDROCHLORIDE 20 MG/1
20 TABLET ORAL 2 TIMES DAILY
Qty: 60 TABLET | Refills: 0 | Status: SHIPPED | OUTPATIENT
Start: 2024-04-16

## 2024-04-08 RX ORDER — FLUTICASONE PROPIONATE 50 MCG
2 SPRAY, SUSPENSION (ML) NASAL DAILY
Qty: 16 G | Refills: 1 | Status: SHIPPED | OUTPATIENT
Start: 2024-04-08

## 2024-04-08 RX ORDER — METHYLPHENIDATE HYDROCHLORIDE 20 MG/1
20 TABLET ORAL 2 TIMES DAILY
Qty: 60 TABLET | Refills: 0 | Status: SHIPPED | OUTPATIENT
Start: 2024-05-15

## 2024-04-08 RX ORDER — METHYLPHENIDATE HYDROCHLORIDE 20 MG/1
20 TABLET ORAL 2 TIMES DAILY
Qty: 60 TABLET | Refills: 0 | Status: SHIPPED | OUTPATIENT
Start: 2024-06-13

## 2024-04-08 NOTE — PROGRESS NOTES
Subjective:       Patient ID: Karolyn Campbell is a 43 y.o. female.    Chief Complaint: Follow-up (3 month follow up ) and Otalgia (Left ear pressure )        Patient is a 43-year-old white female with ADHD, stress reaction with anxiety, history of HPV, recurrent cold sores, and chronic allergies that is here today for 3 month follow up.     ADHD  1st evaluated for ADHD by psychologist in [place] in the 10th grade.    Diagnosed with ADHD predominantly inattentive and compulsive traits - NO access to records  Treated by Dr. Linda Pineda on 05/28/2018 - started Adderall XR 10 mg daily but not tolerated   Changed to Ritalin 10 mg twice daily on 07/13/2018.   Since February 2019, patient has been on the same dose of Ritalin 20 mg twice daily.   Able to focus and complete tasks without side effects.  She reports no problems with sleep.   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  3 post-dated printed prescriptions given to patient due to medication shortage  Follow up in 3 months.     Stress Reaction  Patient diagnosed in July 2021 with acute stress reaction with increased anxiety and started on Zoloft 25 mg daily.    Increased the Zoloft to 50 mg daily May 2022.   Increased dose is working well without side effects.      Recurrent cold sores  takes Valtrex as needed.    Otalgia   There is pain in the left ear. This is a new problem. The current episode started in the past 7 days. The problem occurs every few minutes. The problem has been waxing and waning. There has been no fever. The pain is at a severity of 2/10. Associated symptoms include rhinorrhea. Pertinent negatives include no headaches. Associated symptoms comments: + mild allergy symptoms.       Review of Systems   HENT:  Positive for ear pain and rhinorrhea. Negative for ear discharge.    Neurological:  Negative for headaches.         Objective:     Vitals:    04/08/24 1638   BP: 118/78   Pulse: 88   Temp: 97.9 °F (36.6 °C)  "  TempSrc: Temporal   SpO2: 99%   Weight: 59.3 kg (130 lb 11.7 oz)   Height: 5' 2" (1.575 m)          Physical Exam  Constitutional:       General: She is not in acute distress.     Appearance: Normal appearance. She is normal weight. She is not toxic-appearing or diaphoretic.      Comments: Body mass index is 23.91 kg/m².     HENT:      Right Ear: Tympanic membrane, ear canal and external ear normal.      Left Ear: Tympanic membrane, ear canal and external ear normal.      Nose:      Right Turbinates: Pale.      Left Turbinates: Pale.      Mouth/Throat:      Pharynx: Posterior oropharyngeal erythema present.      Comments: + postnasal drip  Cardiovascular:      Rate and Rhythm: Normal rate and regular rhythm.      Heart sounds: Normal heart sounds.   Pulmonary:      Effort: Pulmonary effort is normal. No respiratory distress.      Breath sounds: Normal breath sounds.   Neurological:      Mental Status: She is alert and oriented to person, place, and time.   Psychiatric:         Mood and Affect: Mood normal.         Behavior: Behavior normal.           Assessment:         ICD-10-CM ICD-9-CM   1. Attention deficit hyperactivity disorder (ADHD), combined type  F90.2 314.01   2. 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 Recurrent cold sores  B00.1 054.9   4. Dysfunction of left eustachian tube  H69.92 381.81       Plan:       1. Attention deficit hyperactivity disorder (ADHD), combined type  Overview:  1st evaluated for ADHD by psychologist in [place] in the 10th grade.    Diagnosed with ADHD predominantly inattentive and compulsive traits - NO access to records  Treated by Dr. Linda Pineda on 05/28/2018 - started Adderall XR 10 mg daily but not tolerated   Changed to Ritalin 10 mg twice daily on 07/13/2018.   Since February 2019, patient has been on the same dose of Ritalin 20 mg twice daily.   Able to focus and complete tasks without side effects.  She reports no problems " with sleep.   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.  3 post-dated printed prescriptions given to patient due to medication shortage  Follow up in 3 months.    Orders:  -     methylphenidate HCl (RITALIN) 20 MG tablet; Take 1 tablet (20 mg total) by mouth 2 (two) times daily.  Dispense: 60 tablet; Refill: 0  -     methylphenidate HCl (RITALIN) 20 MG tablet; Take 1 tablet (20 mg total) by mouth 2 (two) times daily.  Dispense: 60 tablet; Refill: 0  -     methylphenidate HCl (RITALIN) 20 MG tablet; Take 1 tablet (20 mg total) by mouth 2 (two) times daily.  Dispense: 60 tablet; Refill: 0    2. 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    Increased the Zoloft to 50 mg daily May 2022.   Increased dose is working well without side effects.          3. Recurrent cold sores  Overview:  takes Valtrex as needed.      4. Dysfunction of left eustachian tube  Treat with flonase nasal spray daily and add on sudafed as needed  -     fluticasone propionate (FLONASE) 50 mcg/actuation nasal spray; 2 sprays (100 mcg total) by Each Nostril route once daily.  Dispense: 16 g; Refill: 1       Follow up in about 3 months (around 7/8/2024) for medication follow up.     Patient's Medications   New Prescriptions    FLUTICASONE PROPIONATE (FLONASE) 50 MCG/ACTUATION NASAL SPRAY    2 sprays (100 mcg total) by Each Nostril route once daily.   Previous Medications    SERTRALINE (ZOLOFT) 50 MG TABLET    Take 1 tablet (50 mg total) by mouth once daily.    VALACYCLOVIR (VALTREX) 1000 MG TABLET    Take 2 tablets at onset of cold sore and repeat in 12 hours for 1 additional dose as need for cold sore outbreak   Modified Medications    Modified Medication Previous Medication    METHYLPHENIDATE HCL (RITALIN) 20 MG TABLET methylphenidate HCl (RITALIN) 20 MG tablet       Take 1  tablet (20 mg total) by mouth 2 (two) times daily.    Take 1 tablet (20 mg total) by mouth 2 (two) times daily.    METHYLPHENIDATE HCL (RITALIN) 20 MG TABLET methylphenidate HCl (RITALIN) 20 MG tablet       Take 1 tablet (20 mg total) by mouth 2 (two) times daily.    Take 1 tablet (20 mg total) by mouth 2 (two) times daily.    METHYLPHENIDATE HCL (RITALIN) 20 MG TABLET methylphenidate HCl (RITALIN) 20 MG tablet       Take 1 tablet (20 mg total) by mouth 2 (two) times daily.    Take 1 tablet by mouth twice daily.   Discontinued Medications    No medications on file       Past Medical History:   Diagnosis Date    Abnormal Pap smear of cervix     prior to being a pt     ADHD (attention deficit hyperactivity disorder)     Diagnosed by psychologist in [place] in the 10th grade and was on Ritalin from 10th to 12 grade and got off med; she then did not start back on ADHD medication until 2018 with Dr. Linda Pineda here in office    Allergy     Anxiety     Anxiety     Fever blister     HPV (human papilloma virus) infection        Past Surgical History:   Procedure Laterality Date    AUGMENTATION OF BREAST      BREAST SURGERY      breast augmentation in ; and then in  had an implant deflate that needed repair     SECTION      replacement of  breast implant       due to rupture     RHINOPLASTY         Family History   Adopted: Yes   Problem Relation Age of Onset    No Known Problems Sister     No Known Problems Brother     No Known Problems Daughter     ADD / ADHD Son     Strabismus Son        Social History     Socioeconomic History    Marital status:    Occupational History    Occupation: works curriculum advisor   Tobacco Use    Smoking status: Former     Current packs/day: 0.00     Average packs/day: 0.3 packs/day for 6.7 years (1.7 ttl pk-yrs)     Types: Cigarettes     Start date: 2003     Quit date: 2009     Years since quittin.6     Passive exposure:  Past    Smokeless tobacco: Never   Substance and Sexual Activity    Alcohol use: Yes     Alcohol/week: 0.0 - 1.0 standard drinks of alcohol     Comment: once a month - 1 to 2 drinks per occasion    Drug use: No    Sexual activity: Yes     Partners: Male     Birth control/protection: Partner-Vasectomy   Other Topics Concern    Are you pregnant or think you may be? No    Breast-feeding No   Social History Narrative    .  2 children     Social Determinants of Health     Financial Resource Strain: Low Risk  (6/6/2022)    Overall Financial Resource Strain (CARDIA)     Difficulty of Paying Living Expenses: Not hard at all   Food Insecurity: No Food Insecurity (6/6/2022)    Hunger Vital Sign     Worried About Running Out of Food in the Last Year: Never true     Ran Out of Food in the Last Year: Never true   Transportation Needs: No Transportation Needs (6/6/2022)    PRAPARE - Transportation     Lack of Transportation (Medical): No     Lack of Transportation (Non-Medical): No   Physical Activity: Insufficiently Active (6/6/2022)    Exercise Vital Sign     Days of Exercise per Week: 2 days     Minutes of Exercise per Session: 30 min   Stress: Stress Concern Present (6/6/2022)    [employer] [place] [employer] - Occupational Stress Questionnaire     Feeling of Stress : To some extent   Social Connections: Unknown (6/6/2022)    Social Connection and Isolation Panel [NHANES]     Frequency of Communication with Friends and Family: More than three times a week     Frequency of Social Gatherings with Friends and Family: Twice a week     Active Member of Clubs or Organizations: Yes     Attends Club or Organization Meetings: More than 4 times per year     Marital Status:    Housing Stability: Low Risk  (6/6/2022)    Housing Stability Vital Sign     Unable to Pay for Housing in the Last Year: No     Number of Places Lived in the Last Year: 1     Unstable Housing in the Last Year: No

## 2024-07-09 ENCOUNTER — OFFICE VISIT (OUTPATIENT)
Dept: FAMILY MEDICINE | Facility: CLINIC | Age: 44
End: 2024-07-09
Payer: COMMERCIAL

## 2024-07-09 VITALS
TEMPERATURE: 98 F | HEART RATE: 84 BPM | HEIGHT: 62 IN | WEIGHT: 130.31 LBS | OXYGEN SATURATION: 99 % | DIASTOLIC BLOOD PRESSURE: 70 MMHG | SYSTOLIC BLOOD PRESSURE: 100 MMHG | BODY MASS INDEX: 23.98 KG/M2

## 2024-07-09 DIAGNOSIS — F43.0 STRESS REACTION CAUSING MIXED DISTURBANCE OF EMOTION AND CONDUCT: ICD-10-CM

## 2024-07-09 DIAGNOSIS — B00.1 RECURRENT COLD SORES: ICD-10-CM

## 2024-07-09 DIAGNOSIS — F90.2 ATTENTION DEFICIT HYPERACTIVITY DISORDER (ADHD), COMBINED TYPE: Primary | ICD-10-CM

## 2024-07-09 DIAGNOSIS — Z13.29 THYROID DISORDER SCREEN: ICD-10-CM

## 2024-07-09 DIAGNOSIS — Z00.00 ENCOUNTER FOR BLOOD TEST FOR ROUTINE GENERAL PHYSICAL EXAMINATION: ICD-10-CM

## 2024-07-09 DIAGNOSIS — Z13.220 SCREENING CHOLESTEROL LEVEL: ICD-10-CM

## 2024-07-09 DIAGNOSIS — Z13.1 DIABETES MELLITUS SCREENING: ICD-10-CM

## 2024-07-09 DIAGNOSIS — Z13.0 SCREENING FOR DEFICIENCY ANEMIA: ICD-10-CM

## 2024-07-09 PROCEDURE — 3008F BODY MASS INDEX DOCD: CPT | Mod: CPTII,S$GLB,, | Performed by: NURSE PRACTITIONER

## 2024-07-09 PROCEDURE — 1160F RVW MEDS BY RX/DR IN RCRD: CPT | Mod: CPTII,S$GLB,, | Performed by: NURSE PRACTITIONER

## 2024-07-09 PROCEDURE — 99214 OFFICE O/P EST MOD 30 MIN: CPT | Mod: S$GLB,,, | Performed by: NURSE PRACTITIONER

## 2024-07-09 PROCEDURE — 3074F SYST BP LT 130 MM HG: CPT | Mod: CPTII,S$GLB,, | Performed by: NURSE PRACTITIONER

## 2024-07-09 PROCEDURE — 99999 PR PBB SHADOW E&M-EST. PATIENT-LVL IV: CPT | Mod: PBBFAC,,, | Performed by: NURSE PRACTITIONER

## 2024-07-09 PROCEDURE — 3078F DIAST BP <80 MM HG: CPT | Mod: CPTII,S$GLB,, | Performed by: NURSE PRACTITIONER

## 2024-07-09 PROCEDURE — 1159F MED LIST DOCD IN RCRD: CPT | Mod: CPTII,S$GLB,, | Performed by: NURSE PRACTITIONER

## 2024-07-09 RX ORDER — BUPROPION HYDROCHLORIDE 150 MG/1
150 TABLET ORAL DAILY
Qty: 90 TABLET | Refills: 0 | Status: SHIPPED | OUTPATIENT
Start: 2024-07-09 | End: 2025-07-09

## 2024-07-09 RX ORDER — SERTRALINE HYDROCHLORIDE 25 MG/1
TABLET, FILM COATED ORAL
Qty: 30 TABLET | Refills: 0 | Status: SHIPPED | OUTPATIENT
Start: 2024-07-09 | End: 2024-07-30

## 2024-07-09 RX ORDER — METHYLPHENIDATE HYDROCHLORIDE 20 MG/1
20 TABLET ORAL 2 TIMES DAILY
Qty: 60 TABLET | Refills: 0 | Status: SHIPPED | OUTPATIENT
Start: 2024-07-14

## 2024-07-09 RX ORDER — METHYLPHENIDATE HYDROCHLORIDE 20 MG/1
20 TABLET ORAL 2 TIMES DAILY
Qty: 60 TABLET | Refills: 0 | Status: SHIPPED | OUTPATIENT
Start: 2024-08-12

## 2024-07-09 RX ORDER — METHYLPHENIDATE HYDROCHLORIDE 20 MG/1
20 TABLET ORAL 2 TIMES DAILY
Qty: 60 TABLET | Refills: 0 | Status: SHIPPED | OUTPATIENT
Start: 2024-09-10

## 2024-07-09 NOTE — PROGRESS NOTES
"Subjective:       Patient ID: Karolyn Campbell is a 43 y.o. female.    Chief Complaint: Follow-up (3 months F/U)    HPI    Patient is a 43-year-old white female with ADHD, stress reaction with anxiety, history of HPV, recurrent cold sores, and chronic allergies that is here today for 3 month follow up.     ADHD  1st evaluated for ADHD by psychologist in [place] in the 10th grade.    Diagnosed with ADHD predominantly inattentive and compulsive traits - NO access to records  Treated by Dr. Linda Pineda on 05/28/2018 - started Adderall XR 10 mg daily but not tolerated   Changed to Ritalin 10 mg twice daily on 07/13/2018.   Since February 2019, patient has been on the same dose of Ritalin 20 mg twice daily.   Able to focus and complete tasks without side effects.  She reports no problems with sleep.   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  3 post-dated printed prescriptions given to patient due to medication shortage  Follow up in 3 months.     Stress Reaction  Patient diagnosed in July 2021 with acute stress reaction with increased anxiety and started on Zoloft 25 mg daily.    Increased the Zoloft to 50 mg daily May 2022.   Increased dose is working well BUT complaints of decreased sexual drive/libido  Would like to trial a change to Wellbutrin  STOP the Zoloft 50 mg daily - will taper down dose and stop - see Zoloft 25 mg dosing instructions below.  Start Wellbutrin  mg daily  Follow up in 3 months.     Recurrent cold sores  takes Valtrex as needed.      Vitals:    07/09/24 0839   BP: 100/70   BP Location: Left arm   Patient Position: Sitting   BP Method: Large (Manual)   Pulse: 84   Temp: 97.7 °F (36.5 °C)   TempSrc: Temporal   SpO2: 99%   Weight: 59.1 kg (130 lb 4.7 oz)   Height: 5' 2" (1.575 m)       Assessment:         ICD-10-CM ICD-9-CM   1. Attention deficit hyperactivity disorder (ADHD), combined type  F90.2 314.01   2. Stress reaction causing mixed disturbance of emotion " and conduct  F43.0 308.4   3. Recurrent cold sores  B00.1 054.9   4. Encounter for blood test for routine general physical examination  Z00.00 V72.62   5. Screening for deficiency anemia  Z13.0 V78.1   6. Thyroid disorder screen  Z13.29 V77.0   7. Diabetes mellitus screening  Z13.1 V77.1   8. Screening cholesterol level  Z13.220 V77.91       Plan:       1. Attention deficit hyperactivity disorder (ADHD), combined type  Overview:  1st evaluated for ADHD by psychologist in [place] in the 10th grade.    Diagnosed with ADHD predominantly inattentive and compulsive traits - NO access to records  Treated by Dr. Linda Pineda on 05/28/2018 - started Adderall XR 10 mg daily but not tolerated   Changed to Ritalin 10 mg twice daily on 07/13/2018.   Since February 2019, patient has been on the same dose of Ritalin 20 mg twice daily.   Able to focus and complete tasks without side effects.  She reports no problems with sleep.   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  3 post-dated printed prescriptions given to patient due to medication shortage  Follow up in 3 months.    Orders:  -     methylphenidate HCl (RITALIN) 20 MG tablet; Take 1 tablet (20 mg total) by mouth 2 (two) times daily.  Dispense: 60 tablet; Refill: 0  -     methylphenidate HCl (RITALIN) 20 MG tablet; Take 1 tablet (20 mg total) by mouth 2 (two) times daily.  Dispense: 60 tablet; Refill: 0  -     methylphenidate HCl (RITALIN) 20 MG tablet; Take 1 tablet (20 mg total) by mouth 2 (two) times daily.  Dispense: 60 tablet; Refill: 0    2. 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    Increased the Zoloft to 50 mg daily May 2022.   Increased dose is working well BUT complaints of decreased sexual drive/libido  Would like to trial a change to Wellbutrin  STOP the Zoloft 50 mg daily - will  taper down dose and stop - see Zoloft 25 mg dosing instructions below.  Start Wellbutrin  mg daily  Follow up in 3 months.    Orders:  -     buPROPion (WELLBUTRIN XL) 150 MG TB24 tablet; Take 1 tablet (150 mg total) by mouth once daily.  Dispense: 90 tablet; Refill: 0  -     sertraline (ZOLOFT) 25 MG tablet; Take 2 tablets (50 mg total) by mouth once daily for 7 days, THEN 1 tablet (25 mg total) once daily for 7 days, THEN 1 tablet (25 mg total) every other day for 7 days.  Dispense: 30 tablet; Refill: 0    3. Recurrent cold sores  Overview:  takes Valtrex as needed.  Stable.      4. Encounter for blood test for routine general physical examination  -     CBC Auto Differential; Future; Expected date: 07/09/2024  -     Comprehensive Metabolic Panel; Future; Expected date: 07/09/2024  -     Hemoglobin A1C; Future; Expected date: 07/09/2024  -     Lipid Panel; Future; Expected date: 07/09/2024  -     TSH; Future; Expected date: 07/09/2024    5. Screening for deficiency anemia  -     CBC Auto Differential; Future; Expected date: 07/09/2024    6. Thyroid disorder screen  -     TSH; Future; Expected date: 07/09/2024    7. Diabetes mellitus screening  -     Comprehensive Metabolic Panel; Future; Expected date: 07/09/2024  -     Hemoglobin A1C; Future; Expected date: 07/09/2024    8. Screening cholesterol level  -     Lipid Panel; Future; Expected date: 07/09/2024       Follow up in about 3 months (around 10/9/2024) for fasting labs and wellness exam.     Patient's Medications   New Prescriptions    BUPROPION (WELLBUTRIN XL) 150 MG TB24 TABLET    Take 1 tablet (150 mg total) by mouth once daily.    SERTRALINE (ZOLOFT) 25 MG TABLET    Take 2 tablets (50 mg total) by mouth once daily for 7 days, THEN 1 tablet (25 mg total) once daily for 7 days, THEN 1 tablet (25 mg total) every other day for 7 days.   Previous Medications    VALACYCLOVIR (VALTREX) 1000 MG TABLET    Take 2 tablets at onset of cold sore and repeat in 12  hours for 1 additional dose as need for cold sore outbreak   Modified Medications    Modified Medication Previous Medication    METHYLPHENIDATE HCL (RITALIN) 20 MG TABLET methylphenidate HCl (RITALIN) 20 MG tablet       Take 1 tablet (20 mg total) by mouth 2 (two) times daily.    Take 1 tablet (20 mg total) by mouth 2 (two) times daily.    METHYLPHENIDATE HCL (RITALIN) 20 MG TABLET methylphenidate HCl (RITALIN) 20 MG tablet       Take 1 tablet (20 mg total) by mouth 2 (two) times daily.    Take 1 tablet (20 mg total) by mouth 2 (two) times daily.    METHYLPHENIDATE HCL (RITALIN) 20 MG TABLET methylphenidate HCl (RITALIN) 20 MG tablet       Take 1 tablet (20 mg total) by mouth 2 (two) times daily.    Take 1 tablet (20 mg total) by mouth 2 (two) times daily.   Discontinued Medications    FLUTICASONE PROPIONATE (FLONASE) 50 MCG/ACTUATION NASAL SPRAY    Use 2 sprays (100 mcg total) in each nostril once daily.    SERTRALINE (ZOLOFT) 50 MG TABLET    Take 1 tablet (50 mg total) by mouth once daily.         Review of Systems   HENT: Negative.     Respiratory: Negative.     Cardiovascular: Negative.    Gastrointestinal: Negative.    Genitourinary:         Decreased sexual libido   Psychiatric/Behavioral:  Negative for dysphoric mood. The patient is not nervous/anxious.          Objective:        Physical Exam  Constitutional:       General: She is not in acute distress.     Appearance: Normal appearance. She is normal weight. She is not toxic-appearing or diaphoretic.      Comments: Body mass index is 23.83 kg/m².     Cardiovascular:      Rate and Rhythm: Normal rate and regular rhythm.      Heart sounds: Normal heart sounds.   Pulmonary:      Effort: Pulmonary effort is normal. No respiratory distress.      Breath sounds: Normal breath sounds.   Neurological:      Mental Status: She is alert and oriented to person, place, and time.   Psychiatric:         Mood and Affect: Mood normal.         Behavior: Behavior normal.              Past Medical History:   Diagnosis Date    Abnormal Pap smear of cervix     prior to being a pt     ADHD (attention deficit hyperactivity disorder)     Diagnosed by psychologist in [place] in the 10th grade and was on Ritalin from 10th to 12 grade and got off med; she then did not start back on ADHD medication until 2018 with Dr. Linda Pineda here in office    Allergy     Anxiety     Anxiety     Fever blister     HPV (human papilloma virus) infection        Past Surgical History:   Procedure Laterality Date    AUGMENTATION OF BREAST      BREAST SURGERY      breast augmentation in ; and then in  had an implant deflate that needed repair     SECTION      replacement of  breast implant       due to rupture     RHINOPLASTY         Family History   Adopted: Yes   Problem Relation Name Age of Onset    No Known Problems Sister      No Known Problems Brother      No Known Problems Daughter      ADD / ADHD Son      Strabismus Son         Social History     Socioeconomic History    Marital status:    Occupational History    Occupation: works curriculum advisor   Tobacco Use    Smoking status: Former     Current packs/day: 0.00     Average packs/day: 0.3 packs/day for 6.7 years (1.7 ttl pk-yrs)     Types: Cigarettes     Start date: 2003     Quit date: 2009     Years since quittin.8     Passive exposure: Past    Smokeless tobacco: Never   Substance and Sexual Activity    Alcohol use: Yes     Alcohol/week: 0.0 - 1.0 standard drinks of alcohol     Comment: once a month - 1 to 2 drinks per occasion    Drug use: No    Sexual activity: Yes     Partners: Male     Birth control/protection: Partner-Vasectomy   Other Topics Concern    Are you pregnant or think you may be? No    Breast-feeding No   Social History Narrative    .  2 children     Social Determinants of Health     Financial Resource Strain: Low Risk  (2022)    Overall Financial Resource  Strain (CARDIA)     Difficulty of Paying Living Expenses: Not hard at all   Food Insecurity: No Food Insecurity (6/6/2022)    Hunger Vital Sign     Worried About Running Out of Food in the Last Year: Never true     Ran Out of Food in the Last Year: Never true   Transportation Needs: No Transportation Needs (6/6/2022)    PRAPARE - Transportation     Lack of Transportation (Medical): No     Lack of Transportation (Non-Medical): No   Physical Activity: Insufficiently Active (6/6/2022)    Exercise Vital Sign     Days of Exercise per Week: 2 days     Minutes of Exercise per Session: 30 min   Stress: Stress Concern Present (6/6/2022)    [employer] [place] [employer] - Occupational Stress Questionnaire     Feeling of Stress : To some extent   Housing Stability: Low Risk  (6/6/2022)    Housing Stability Vital Sign     Unable to Pay for Housing in the Last Year: No     Number of Places Lived in the Last Year: 1     Unstable Housing in the Last Year: No

## 2024-07-11 DIAGNOSIS — B00.1 RECURRENT COLD SORES: ICD-10-CM

## 2024-07-11 RX ORDER — VALACYCLOVIR HYDROCHLORIDE 1 G/1
TABLET, FILM COATED ORAL
Qty: 30 TABLET | Refills: 3 | Status: SHIPPED | OUTPATIENT
Start: 2024-07-11

## 2024-09-13 NOTE — PATIENT INSTRUCTIONS
Reviewed negative COVID-19 virus and flu test with patient who verbalized understanding.  Advised patient that her symptoms are indicative of an upper respiratory infection which is viral in nature and should be treated symptomatically.  We discussed over-the-counter medications as well as home remedies to help with current symptoms.   Patient educational handouts also included in discharge paperwork for patient who verbalized understanding agrees with plan of care.  She denies any further questions or concerns at this time.  Patient exits exam room in no acute distress.    PAST SURGICAL HISTORY:  No significant past surgical history

## 2024-09-18 ENCOUNTER — PATIENT OUTREACH (OUTPATIENT)
Dept: ADMINISTRATIVE | Facility: HOSPITAL | Age: 44
End: 2024-09-18
Payer: COMMERCIAL

## 2024-09-19 ENCOUNTER — PATIENT MESSAGE (OUTPATIENT)
Dept: ADMINISTRATIVE | Facility: HOSPITAL | Age: 44
End: 2024-09-19
Payer: COMMERCIAL

## 2024-09-19 NOTE — PROGRESS NOTES
09/19/2024  VB chart audit performed. Care Everywhere updates requested and reviewed  Overdue HM topic chart audit and/or requested. LINKS triggered and reconciled. Media reviewed Lvm/portal sent regarding overdue health topics

## 2024-09-25 ENCOUNTER — TELEPHONE (OUTPATIENT)
Dept: FAMILY MEDICINE | Facility: CLINIC | Age: 44
End: 2024-09-25
Payer: COMMERCIAL

## 2024-09-25 DIAGNOSIS — F90.2 ATTENTION DEFICIT HYPERACTIVITY DISORDER (ADHD), COMBINED TYPE: ICD-10-CM

## 2024-09-25 RX ORDER — METHYLPHENIDATE HYDROCHLORIDE 20 MG/1
20 TABLET ORAL 2 TIMES DAILY
Qty: 60 TABLET | Refills: 0 | Status: SHIPPED | OUTPATIENT
Start: 2024-10-10

## 2024-09-25 NOTE — TELEPHONE ENCOUNTER
Prescription dated 10/10/2024 and she can  today.  Keep appt as scheduled and when I seen her at office visit, I will date her other 2 prescriptions and give at time of visit.

## 2024-09-25 NOTE — TELEPHONE ENCOUNTER
Natty- I reschedule patient Wellness she had schedule on 10/09 to 10/21- patient is concerned because is due on 10/09. Please advised- if you will write RX before you leave on vacation 10/09.

## 2024-10-04 DIAGNOSIS — F43.0 STRESS REACTION CAUSING MIXED DISTURBANCE OF EMOTION AND CONDUCT: ICD-10-CM

## 2024-10-04 RX ORDER — BUPROPION HYDROCHLORIDE 150 MG/1
150 TABLET ORAL
Qty: 90 TABLET | Refills: 0 | Status: SHIPPED | OUTPATIENT
Start: 2024-10-04

## 2024-10-21 ENCOUNTER — OFFICE VISIT (OUTPATIENT)
Dept: FAMILY MEDICINE | Facility: CLINIC | Age: 44
End: 2024-10-21
Payer: COMMERCIAL

## 2024-10-21 VITALS
WEIGHT: 133.69 LBS | OXYGEN SATURATION: 98 % | HEART RATE: 82 BPM | TEMPERATURE: 98 F | SYSTOLIC BLOOD PRESSURE: 104 MMHG | DIASTOLIC BLOOD PRESSURE: 76 MMHG | BODY MASS INDEX: 24.6 KG/M2 | HEIGHT: 62 IN

## 2024-10-21 DIAGNOSIS — F90.2 ATTENTION DEFICIT HYPERACTIVITY DISORDER (ADHD), COMBINED TYPE: ICD-10-CM

## 2024-10-21 DIAGNOSIS — F43.0 STRESS REACTION CAUSING MIXED DISTURBANCE OF EMOTION AND CONDUCT: ICD-10-CM

## 2024-10-21 DIAGNOSIS — Z00.00 ANNUAL PHYSICAL EXAM: Primary | ICD-10-CM

## 2024-10-21 DIAGNOSIS — B00.1 RECURRENT COLD SORES: ICD-10-CM

## 2024-10-21 DIAGNOSIS — Z23 FLU VACCINE NEED: ICD-10-CM

## 2024-10-21 PROCEDURE — 1160F RVW MEDS BY RX/DR IN RCRD: CPT | Mod: CPTII,S$GLB,, | Performed by: NURSE PRACTITIONER

## 2024-10-21 PROCEDURE — 3008F BODY MASS INDEX DOCD: CPT | Mod: CPTII,S$GLB,, | Performed by: NURSE PRACTITIONER

## 2024-10-21 PROCEDURE — 3074F SYST BP LT 130 MM HG: CPT | Mod: CPTII,S$GLB,, | Performed by: NURSE PRACTITIONER

## 2024-10-21 PROCEDURE — 90656 IIV3 VACC NO PRSV 0.5 ML IM: CPT | Mod: S$GLB,,, | Performed by: NURSE PRACTITIONER

## 2024-10-21 PROCEDURE — 90471 IMMUNIZATION ADMIN: CPT | Mod: S$GLB,,, | Performed by: NURSE PRACTITIONER

## 2024-10-21 PROCEDURE — 99999 PR PBB SHADOW E&M-EST. PATIENT-LVL IV: CPT | Mod: PBBFAC,,, | Performed by: NURSE PRACTITIONER

## 2024-10-21 PROCEDURE — 1159F MED LIST DOCD IN RCRD: CPT | Mod: CPTII,S$GLB,, | Performed by: NURSE PRACTITIONER

## 2024-10-21 PROCEDURE — 99396 PREV VISIT EST AGE 40-64: CPT | Mod: 25,S$GLB,, | Performed by: NURSE PRACTITIONER

## 2024-10-21 PROCEDURE — 3044F HG A1C LEVEL LT 7.0%: CPT | Mod: CPTII,S$GLB,, | Performed by: NURSE PRACTITIONER

## 2024-10-21 PROCEDURE — 3078F DIAST BP <80 MM HG: CPT | Mod: CPTII,S$GLB,, | Performed by: NURSE PRACTITIONER

## 2024-10-21 RX ORDER — METHYLPHENIDATE HYDROCHLORIDE 20 MG/1
20 TABLET ORAL 2 TIMES DAILY
Qty: 60 TABLET | Refills: 0 | Status: SHIPPED | OUTPATIENT
Start: 2024-12-06

## 2024-10-21 RX ORDER — METHYLPHENIDATE HYDROCHLORIDE 20 MG/1
20 TABLET ORAL 2 TIMES DAILY
Qty: 60 TABLET | Refills: 0 | Status: SHIPPED | OUTPATIENT
Start: 2024-11-08

## 2024-10-21 RX ORDER — BUPROPION HYDROCHLORIDE 300 MG/1
300 TABLET ORAL DAILY
Qty: 90 TABLET | Refills: 0 | Status: SHIPPED | OUTPATIENT
Start: 2024-10-21

## 2024-10-21 NOTE — PROGRESS NOTES
"Subjective:       Patient ID: Karolyn Campbell is a 44 y.o. female.    Chief Complaint: Annual Exam        HPI WITH ASSESSMENT AND PLAN OF CARE:           Patient is a 44-year-old white female with ADHD, stress reaction with anxiety, history of HPV, recurrent cold sores, and chronic allergies that is here today for ANNUAL EXAM with fasting lab results.      ADHD  1st evaluated for ADHD by psychologist in [place] in the 10th grade.    Diagnosed with ADHD predominantly inattentive and compulsive traits - NO access to records  Treated by Dr. Linda Pineda on 05/28/2018 - started Adderall XR 10 mg daily but not tolerated   Changed to Ritalin 10 mg twice daily on 07/13/2018.   Since February 2019, patient has been on the same dose of Ritalin 20 mg twice daily.   Able to focus and complete tasks without side effects.  She reports no problems with sleep.   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  3 post-dated printed prescriptions given to patient due to medication shortage  /76 (BP Location: Left arm, Patient Position: Sitting)   Pulse 82   Temp 97.7 °F (36.5 °C) (Temporal)   Ht 5' 2" (1.575 m)   Wt 60.6 kg (133 lb 11.3 oz)   LMP 10/12/2024   SpO2 98%   BMI 24.46 kg/m²   Follow up in 3 months.     Stress Reaction  Patient diagnosed in July 2021 with acute stress reaction with increased anxiety and started on Zoloft 25 mg daily.    Increased the Zoloft to 50 mg daily May 2022.   Increased dose with complaints of decreased sexual drive/libido  7/9/2024:  STOPPED the Zoloft 50 mg daily and Started Wellbutrin  mg daily  Sexual side effects resolved but anxiety still present on Wellbutrin 150 mg dose  Will increase to 300 mg daily  Follow up in 3 months.     Recurrent cold sores  takes Valtrex as needed.      Wellness Labs:  CBC -WNL  CMP- okay  TSH - WNL  Lipids - WNL  Hgb A1C 5.1%      Health Maintenance:    Colonoscopy - patient plans to schedule this coming summer  Mammogram " up to date  PAP up to date   Flu vaccine declines/will receive today      Lab Visit on 10/16/2024   Component Date Value Ref Range Status    WBC 10/16/2024 4.89  3.90 - 12.70 K/uL Final    RBC 10/16/2024 4.26  4.00 - 5.40 M/uL Final    Hemoglobin 10/16/2024 12.7  12.0 - 16.0 g/dL Final    Hematocrit 10/16/2024 38.9  37.0 - 48.5 % Final    MCV 10/16/2024 91  82 - 98 fL Final    MCH 10/16/2024 29.8  27.0 - 31.0 pg Final    MCHC 10/16/2024 32.6  32.0 - 36.0 g/dL Final    RDW 10/16/2024 12.5  11.5 - 14.5 % Final    Platelets 10/16/2024 290  150 - 450 K/uL Final    MPV 10/16/2024 9.9  9.2 - 12.9 fL Final    Immature Granulocytes 10/16/2024 0.0  0.0 - 0.5 % Final    Gran # (ANC) 10/16/2024 2.4  1.8 - 7.7 K/uL Final    Immature Grans (Abs) 10/16/2024 0.00  0.00 - 0.04 K/uL Final    Comment: Mild elevation in immature granulocytes is non specific and   can be seen in a variety of conditions including stress response,   acute inflammation, trauma and pregnancy. Correlation with other   laboratory and clinical findings is essential.      Lymph # 10/16/2024 1.8  1.0 - 4.8 K/uL Final    Mono # 10/16/2024 0.4  0.3 - 1.0 K/uL Final    Eos # 10/16/2024 0.2  0.0 - 0.5 K/uL Final    Baso # 10/16/2024 0.05  0.00 - 0.20 K/uL Final    nRBC 10/16/2024 0  0 /100 WBC Final    Gran % 10/16/2024 49.9  38.0 - 73.0 % Final    Lymph % 10/16/2024 36.8  18.0 - 48.0 % Final    Mono % 10/16/2024 8.2  4.0 - 15.0 % Final    Eosinophil % 10/16/2024 4.1  0.0 - 8.0 % Final    Basophil % 10/16/2024 1.0  0.0 - 1.9 % Final    Differential Method 10/16/2024 Automated   Final    Sodium 10/16/2024 141  136 - 145 mmol/L Final    Potassium 10/16/2024 4.3  3.5 - 5.1 mmol/L Final    Chloride 10/16/2024 110  95 - 110 mmol/L Final    CO2 10/16/2024 23  23 - 29 mmol/L Final    Glucose 10/16/2024 105  70 - 110 mg/dL Final    BUN 10/16/2024 15  6 - 20 mg/dL Final    Creatinine 10/16/2024 0.8  0.5 - 1.4 mg/dL Final    Calcium 10/16/2024 8.9  8.7 - 10.5 mg/dL Final     Total Protein 10/16/2024 6.9  6.0 - 8.4 g/dL Final    Albumin 10/16/2024 3.9  3.5 - 5.2 g/dL Final    Total Bilirubin 10/16/2024 0.4  0.1 - 1.0 mg/dL Final    Comment: For infants and newborns, interpretation of results should be based  on gestational age, weight and in agreement with clinical  observations.    Premature Infant recommended reference ranges:  Up to 24 hours.............<8.0 mg/dL  Up to 48 hours............<12.0 mg/dL  3-5 days..................<15.0 mg/dL  6-29 days.................<15.0 mg/dL      Alkaline Phosphatase 10/16/2024 45 (L)  55 - 135 U/L Final    AST 10/16/2024 15  10 - 40 U/L Final    ALT 10/16/2024 12  10 - 44 U/L Final    eGFR 10/16/2024 >60.0  >60 mL/min/1.73 m^2 Final    Anion Gap 10/16/2024 8  8 - 16 mmol/L Final    Hemoglobin A1C 10/16/2024 5.1  4.0 - 5.6 % Final    Comment: ADA Screening Guidelines:  5.7-6.4%  Consistent with prediabetes  >or=6.5%  Consistent with diabetes    High levels of fetal hemoglobin interfere with the HbA1C  assay. Heterozygous hemoglobin variants (HbS, HgC, etc)do  not significantly interfere with this assay.   However, presence of multiple variants may affect accuracy.      Estimated Avg Glucose 10/16/2024 100  68 - 131 mg/dL Final    Cholesterol 10/16/2024 173  120 - 199 mg/dL Final    Comment: The National Cholesterol Education Program (NCEP) has set the  following guidelines (reference ranges) for Cholesterol:  Optimal.....................<200 mg/dL  Borderline High.............200-239 mg/dL  High........................> or = 240 mg/dL      Triglycerides 10/16/2024 78  30 - 150 mg/dL Final    Comment: The National Cholesterol Education Program (NCEP) has set the  following guidelines (reference values) for triglycerides:  Normal......................<150 mg/dL  Borderline High.............150-199 mg/dL  High........................200-499 mg/dL      HDL 10/16/2024 61  40 - 75 mg/dL Final    Comment: The National Cholesterol Education Program (NCEP)  "has set the  following guidelines (reference values) for HDL Cholesterol:  Low...............<40 mg/dL  Optimal...........>60 mg/dL      LDL Cholesterol 10/16/2024 96.4  63.0 - 159.0 mg/dL Final    Comment: The National Cholesterol Education Program (NCEP) has set the  following guidelines (reference values) for LDL Cholesterol:  Optimal.......................<130 mg/dL  Borderline High...............130-159 mg/dL  High..........................160-189 mg/dL  Very High.....................>190 mg/dL      HDL/Cholesterol Ratio 10/16/2024 35.3  20.0 - 50.0 % Final    Total Cholesterol/HDL Ratio 10/16/2024 2.8  2.0 - 5.0 Final    Non-HDL Cholesterol 10/16/2024 112  mg/dL Final    Comment: Risk category and Non-HDL cholesterol goals:  Coronary heart disease (CHD)or equivalent (10-year risk of CHD >20%):  Non-HDL cholesterol goal     <130 mg/dL  Two or more CHD risk factors and 10-year risk of CHD <= 20%:  Non-HDL cholesterol goal     <160 mg/dL  0 to 1 CHD risk factor:  Non-HDL cholesterol goal     <190 mg/dL      TSH 10/16/2024 1.816  0.400 - 4.000 uIU/mL Final       Vitals:    10/21/24 1633   BP: 104/76   BP Location: Left arm   Patient Position: Sitting   Pulse: 82   Temp: 97.7 °F (36.5 °C)   TempSrc: Temporal   SpO2: 98%   Weight: 60.6 kg (133 lb 11.3 oz)   Height: 5' 2" (1.575 m)         Diagnoses this Encounter:         ICD-10-CM ICD-9-CM   1. Annual physical exam  Z00.00 V70.0   2. Attention deficit hyperactivity disorder (ADHD), combined type  F90.2 314.01   3. 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 Recurrent cold sores  B00.1 054.9   5. Flu vaccine need  Z23 V04.81       Orders Placed This Encounter    influenza (Flulaval, Fluzone, Fluarix) 45 mcg/0.5 mL IM vaccine (> or = 6 mo) 0.5 mL    buPROPion (WELLBUTRIN XL) 300 MG 24 hr tablet    methylphenidate HCl (RITALIN) 20 MG tablet    methylphenidate HCl (RITALIN) 20 MG tablet        Follow up in about 3 months (around " 1/21/2025) for medication check.     Patient's Medications   New Prescriptions    No medications on file   Previous Medications    METHYLPHENIDATE HCL (RITALIN) 20 MG TABLET    Take 1 tablet (20 mg total) by mouth 2 (two) times daily.    VALACYCLOVIR (VALTREX) 1000 MG TABLET    Take 2 tablets at onset of cold sore and repeat in 12 hours for 1 additional dose as need for cold sore outbreak   Modified Medications    Modified Medication Previous Medication    BUPROPION (WELLBUTRIN XL) 300 MG 24 HR TABLET buPROPion (WELLBUTRIN XL) 150 MG TB24 tablet       Take 1 tablet (300 mg total) by mouth once daily.    TAKE 1 TABLET BY MOUTH EVERY DAY    METHYLPHENIDATE HCL (RITALIN) 20 MG TABLET methylphenidate HCl (RITALIN) 20 MG tablet       Take 1 tablet (20 mg total) by mouth 2 (two) times daily.    Take 1 tablet (20 mg total) by mouth 2 (two) times daily.    METHYLPHENIDATE HCL (RITALIN) 20 MG TABLET methylphenidate HCl (RITALIN) 20 MG tablet       Take 1 tablet (20 mg total) by mouth 2 (two) times daily.    Take 1 tablet (20 mg total) by mouth 2 (two) times daily.   Discontinued Medications    METHYLPHENIDATE HCL (RITALIN) 20 MG TABLET    Take 1 tablet by mouth twice daily.    SERTRALINE (ZOLOFT) 25 MG TABLET    Take 2 tablets (50 mg total) by mouth once daily for 7 days, THEN 1 tablet (25 mg total) once daily for 7 days, THEN 1 tablet (25 mg total) every other day for 7 days.         Review of Systems   HENT: Negative.     Respiratory: Negative.     Cardiovascular: Negative.    Gastrointestinal: Negative.          Objective:        Physical Exam  Constitutional:       General: She is not in acute distress.     Appearance: Normal appearance. She is well-developed and normal weight. She is not ill-appearing, toxic-appearing or diaphoretic.      Comments: Body mass index is 24.46 kg/m².       HENT:      Head: Normocephalic and atraumatic.   Eyes:      General: No scleral icterus.        Right eye: No discharge.         Left  eye: No discharge.      Extraocular Movements: Extraocular movements intact.      Conjunctiva/sclera: Conjunctivae normal.   Neck:      Thyroid: No thyromegaly.      Trachea: No tracheal deviation.   Cardiovascular:      Rate and Rhythm: Normal rate and regular rhythm.      Heart sounds: Normal heart sounds. No murmur heard.  Pulmonary:      Effort: Pulmonary effort is normal. No respiratory distress.      Breath sounds: Normal breath sounds.   Abdominal:      General: There is no distension.   Musculoskeletal:         General: No swelling or deformity. Normal range of motion.      Cervical back: Normal range of motion.      Right lower leg: No edema.      Left lower leg: No edema.   Skin:     General: Skin is warm and dry.      Findings: No rash.   Neurological:      Mental Status: She is alert and oriented to person, place, and time.      Cranial Nerves: No cranial nerve deficit.      Coordination: Coordination normal.   Psychiatric:         Mood and Affect: Mood normal.         Behavior: Behavior normal.         Thought Content: Thought content normal.         Judgment: Judgment normal.             Past Medical History:   Diagnosis Date    Abnormal Pap smear of cervix     prior to being a pt     ADHD (attention deficit hyperactivity disorder)     Diagnosed by psychologist in [place] in the 10th grade and was on Ritalin from 10th to 12 grade and got off med; she then did not start back on ADHD medication until 2018 with Dr. Linda Pineda here in office    Allergy     Anxiety     Anxiety     Fever blister     HPV (human papilloma virus) infection        Past Surgical History:   Procedure Laterality Date    AUGMENTATION OF BREAST      BREAST SURGERY      breast augmentation in ; and then in  had an implant deflate that needed repair     SECTION      replacement of  breast implant       due to rupture     RHINOPLASTY         Family History   Adopted: Yes   Problem  Relation Name Age of Onset    No Known Problems Sister      No Known Problems Brother      No Known Problems Daughter      ADD / ADHD Son      Strabismus Son         Social History     Socioeconomic History    Marital status:    Occupational History    Occupation: works curriculum advisor   Tobacco Use    Smoking status: Former     Current packs/day: 0.00     Average packs/day: 0.3 packs/day for 6.7 years (1.7 ttl pk-yrs)     Types: Cigarettes     Start date: 1/1/2003     Quit date: 9/1/2009     Years since quitting: 15.1     Passive exposure: Past    Smokeless tobacco: Never   Substance and Sexual Activity    Alcohol use: Yes     Alcohol/week: 0.0 - 1.0 standard drinks of alcohol     Comment: once a month - 1 to 2 drinks per occasion    Drug use: No    Sexual activity: Yes     Partners: Male     Birth control/protection: Partner-Vasectomy   Other Topics Concern    Are you pregnant or think you may be? No    Breast-feeding No   Social History Narrative    .  2 children     Social Drivers of Health     Financial Resource Strain: Low Risk  (6/6/2022)    Overall Financial Resource Strain (CARDIA)     Difficulty of Paying Living Expenses: Not hard at all   Food Insecurity: No Food Insecurity (6/6/2022)    Hunger Vital Sign     Worried About Running Out of Food in the Last Year: Never true     Ran Out of Food in the Last Year: Never true   Transportation Needs: No Transportation Needs (6/6/2022)    PRAPARE - Transportation     Lack of Transportation (Medical): No     Lack of Transportation (Non-Medical): No   Physical Activity: Insufficiently Active (6/6/2022)    Exercise Vital Sign     Days of Exercise per Week: 2 days     Minutes of Exercise per Session: 30 min   Stress: Stress Concern Present (6/6/2022)    [employer] - Occupational Stress Questionnaire     Feeling of Stress : To some extent   Housing Stability: Low Risk  (6/6/2022)    Housing Stability Vital Sign     Unable  to Pay for Housing in the Last Year: No     Number of Places Lived in the Last Year: 1     Unstable Housing in the Last Year: No

## 2024-11-27 ENCOUNTER — HOSPITAL ENCOUNTER (OUTPATIENT)
Dept: RADIOLOGY | Facility: HOSPITAL | Age: 44
Discharge: HOME | End: 2024-11-27
Attending: NURSE PRACTITIONER
Payer: COMMERCIAL

## 2024-11-27 VITALS — WEIGHT: 133 LBS | BODY MASS INDEX: 24.48 KG/M2 | HEIGHT: 62 IN

## 2024-11-27 DIAGNOSIS — Z12.31 ENCOUNTER FOR SCREENING MAMMOGRAM FOR BREAST CANCER: ICD-10-CM

## 2024-11-27 PROCEDURE — 77067 SCR MAMMO BI INCL CAD: CPT | Mod: TC

## 2024-12-11 ENCOUNTER — OFFICE VISIT (OUTPATIENT)
Dept: URGENT CARE | Facility: CLINIC | Age: 44
End: 2024-12-11
Payer: COMMERCIAL

## 2024-12-11 VITALS
WEIGHT: 132.38 LBS | DIASTOLIC BLOOD PRESSURE: 73 MMHG | RESPIRATION RATE: 19 BRPM | BODY MASS INDEX: 24.36 KG/M2 | TEMPERATURE: 98 F | OXYGEN SATURATION: 100 % | HEIGHT: 62 IN | SYSTOLIC BLOOD PRESSURE: 103 MMHG | HEART RATE: 84 BPM

## 2024-12-11 DIAGNOSIS — H65.02 NON-RECURRENT ACUTE SEROUS OTITIS MEDIA OF LEFT EAR: Primary | ICD-10-CM

## 2024-12-11 DIAGNOSIS — R09.81 NASAL CONGESTION: ICD-10-CM

## 2024-12-11 PROCEDURE — 99213 OFFICE O/P EST LOW 20 MIN: CPT | Mod: S$GLB,,,

## 2024-12-11 RX ORDER — AMOXICILLIN AND CLAVULANATE POTASSIUM 875; 125 MG/1; MG/1
1 TABLET, FILM COATED ORAL EVERY 12 HOURS
Qty: 14 TABLET | Refills: 0 | Status: SHIPPED | OUTPATIENT
Start: 2024-12-11 | End: 2024-12-18

## 2024-12-11 RX ORDER — FLUTICASONE PROPIONATE 50 MCG
1 SPRAY, SUSPENSION (ML) NASAL DAILY
Qty: 16 G | Refills: 0 | Status: SHIPPED | OUTPATIENT
Start: 2024-12-11

## 2024-12-11 NOTE — PROGRESS NOTES
"Subjective:      Patient ID: Karolyn Campbell is a 44 y.o. female.    Vitals:  height is 5' 2" (1.575 m) and weight is 60.1 kg (132 lb 6.2 oz). Her oral temperature is 98 °F (36.7 °C). Her blood pressure is 103/73 and her pulse is 84. Her respiration is 19 and oxygen saturation is 100%.     Chief Complaint: Otalgia    44-year-old female here today for nasal congestion and left ear pain for the past 4 days.  Denies fever, chills, coughing, sore throat, dizziness nausea, vomiting.    Otalgia   There is pain in the left ear. This is a new problem. Episode onset: 3 days ago. The problem occurs every few minutes. The problem has been gradually improving. There has been no fever. The pain is at a severity of 4/10. The pain is mild. Associated symptoms include headaches. Pertinent negatives include no abdominal pain, coughing, ear discharge, hearing loss, neck pain, sore throat or vomiting. She has tried nothing for the symptoms. There is no history of a chronic ear infection, hearing loss or a tympanostomy tube.       Constitution: Negative for chills and fever.   HENT:  Positive for ear pain (L) and congestion. Negative for ear discharge, hearing loss and sore throat.    Neck: Negative for neck pain.   Cardiovascular:  Negative for chest pain.   Respiratory:  Negative for cough and shortness of breath.    Gastrointestinal:  Negative for abdominal pain, nausea and vomiting.   Neurological:  Positive for headaches. Negative for dizziness.      Objective:     Physical Exam   Constitutional: She is oriented to person, place, and time. She appears well-developed.   HENT:   Head: Normocephalic and atraumatic.   Ears:   Right Ear: Tympanic membrane, external ear and ear canal normal. Tympanic membrane is not erythematous. No middle ear effusion.   Left Ear: External ear and ear canal normal. Tympanic membrane is erythematous. A middle ear effusion is present.   Nose: Nose normal.   Mouth/Throat: Oropharynx is clear and moist. " Mucous membranes are moist. Oropharynx is clear.   Eyes: Conjunctivae, EOM and lids are normal. Pupils are equal, round, and reactive to light.   Neck: Trachea normal and phonation normal. Neck supple.   Cardiovascular: Normal rate and regular rhythm.   Pulmonary/Chest: Effort normal and breath sounds normal.   Musculoskeletal: Normal range of motion.         General: Normal range of motion.   Neurological: no focal deficit. She is alert and oriented to person, place, and time.   Skin: Skin is warm, dry and intact. Capillary refill takes less than 2 seconds.   Psychiatric: Her speech is normal and behavior is normal. Judgment and thought content normal.   Nursing note and vitals reviewed.      Assessment:     1. Non-recurrent acute serous otitis media of left ear    2. Nasal congestion        Plan:       Non-recurrent acute serous otitis media of left ear  -     amoxicillin-clavulanate 875-125mg (AUGMENTIN) 875-125 mg per tablet; Take 1 tablet by mouth every 12 (twelve) hours. for 7 days  Dispense: 14 tablet; Refill: 0    Nasal congestion  -     fluticasone propionate (FLONASE) 50 mcg/actuation nasal spray; 1 spray (50 mcg total) by Each Nostril route once daily.  Dispense: 16 mL; Refill: 0                  Patient Instructions     Take the antibiotics as prescribed.    Take Flonase as prescribed for congestion  - Follow up with your PCP or specialty clinic as directed in the next 1-2 weeks if not improved or as needed.  You can call [phone] to schedule an appointment with the appropriate provider.    - Go to the ER or seek medical attention immediately if you develop new or worsening symptoms.    - You must understand that you have received an Urgent Care treatment only and that you may be released before all of your medical problems are known or treated.   - You, the patient, will arrange for follow up care as instructed.   - If your condition worsens or fails to improve we recommend that you receive  another evaluation at the ER immediately or contact your PCP to discuss your concerns or return here.

## 2024-12-11 NOTE — PATIENT INSTRUCTIONS
Take the antibiotics as prescribed.    Take Flonase as prescribed for congestion  - Follow up with your PCP or specialty clinic as directed in the next 1-2 weeks if not improved or as needed.  You can call [phone] to schedule an appointment with the appropriate provider.    - Go to the ER or seek medical attention immediately if you develop new or worsening symptoms.    - You must understand that you have received an Urgent Care treatment only and that you may be released before all of your medical problems are known or treated.   - You, the patient, will arrange for follow up care as instructed.   - If your condition worsens or fails to improve we recommend that you receive another evaluation at the ER immediately or contact your PCP to discuss your concerns or return here.

## 2025-01-02 ENCOUNTER — OFFICE VISIT (OUTPATIENT)
Dept: FAMILY MEDICINE | Facility: CLINIC | Age: 45
End: 2025-01-02
Payer: COMMERCIAL

## 2025-01-02 VITALS
BODY MASS INDEX: 25.23 KG/M2 | WEIGHT: 137.13 LBS | HEIGHT: 62 IN | DIASTOLIC BLOOD PRESSURE: 76 MMHG | TEMPERATURE: 98 F | OXYGEN SATURATION: 97 % | HEART RATE: 75 BPM | SYSTOLIC BLOOD PRESSURE: 110 MMHG

## 2025-01-02 DIAGNOSIS — F43.0 STRESS REACTION CAUSING MIXED DISTURBANCE OF EMOTION AND CONDUCT: ICD-10-CM

## 2025-01-02 DIAGNOSIS — Z12.11 COLON CANCER SCREENING: ICD-10-CM

## 2025-01-02 DIAGNOSIS — F90.2 ATTENTION DEFICIT HYPERACTIVITY DISORDER (ADHD), COMBINED TYPE: Primary | ICD-10-CM

## 2025-01-02 DIAGNOSIS — H92.02 OTALGIA OF LEFT EAR: ICD-10-CM

## 2025-01-02 PROCEDURE — 3008F BODY MASS INDEX DOCD: CPT | Mod: CPTII,S$GLB,, | Performed by: NURSE PRACTITIONER

## 2025-01-02 PROCEDURE — 3074F SYST BP LT 130 MM HG: CPT | Mod: CPTII,S$GLB,, | Performed by: NURSE PRACTITIONER

## 2025-01-02 PROCEDURE — 1159F MED LIST DOCD IN RCRD: CPT | Mod: CPTII,S$GLB,, | Performed by: NURSE PRACTITIONER

## 2025-01-02 PROCEDURE — 99999 PR PBB SHADOW E&M-EST. PATIENT-LVL IV: CPT | Mod: PBBFAC,,, | Performed by: NURSE PRACTITIONER

## 2025-01-02 PROCEDURE — 1160F RVW MEDS BY RX/DR IN RCRD: CPT | Mod: CPTII,S$GLB,, | Performed by: NURSE PRACTITIONER

## 2025-01-02 PROCEDURE — 99214 OFFICE O/P EST MOD 30 MIN: CPT | Mod: S$GLB,,, | Performed by: NURSE PRACTITIONER

## 2025-01-02 PROCEDURE — 3078F DIAST BP <80 MM HG: CPT | Mod: CPTII,S$GLB,, | Performed by: NURSE PRACTITIONER

## 2025-01-02 RX ORDER — BUPROPION HYDROCHLORIDE 300 MG/1
300 TABLET ORAL DAILY
Qty: 90 TABLET | Refills: 0 | Status: SHIPPED | OUTPATIENT
Start: 2025-01-02

## 2025-01-02 RX ORDER — METHYLPHENIDATE HYDROCHLORIDE 20 MG/1
20 TABLET ORAL 2 TIMES DAILY
Qty: 60 TABLET | Refills: 0 | Status: SHIPPED | OUTPATIENT
Start: 2025-01-06

## 2025-01-02 RX ORDER — METHYLPHENIDATE HYDROCHLORIDE 20 MG/1
20 TABLET ORAL 2 TIMES DAILY
Qty: 60 TABLET | Refills: 0 | Status: SHIPPED | OUTPATIENT
Start: 2025-02-04

## 2025-01-02 RX ORDER — METHYLPHENIDATE HYDROCHLORIDE 20 MG/1
20 TABLET ORAL 2 TIMES DAILY
Qty: 60 TABLET | Refills: 0 | Status: SHIPPED | OUTPATIENT
Start: 2025-03-05

## 2025-01-02 NOTE — PROGRESS NOTES
"Subjective:       Patient ID: Karolyn Campbell is a 44 y.o. female.    Chief Complaint: ADHD (3 months Med Check) and Otalgia (Left ear pain)        HPI WITH ASSESSMENT AND PLAN OF CARE:      Patient is a 44-year-old white female with ADHD, stress reaction with anxiety, history of HPV, recurrent cold sores, and chronic allergies that is here today for 3 month follow up.  Wellness exam done 10/21/2024.     ADHD  1st evaluated for ADHD by psychologist in [place] in the 10th grade.    Diagnosed with ADHD predominantly inattentive and compulsive traits - NO access to records  Treated by Dr. Linda Pineda on 05/28/2018 - started Adderall XR 10 mg daily but not tolerated   Changed to Ritalin 10 mg twice daily on 07/13/2018.   Since February 2019, patient has been on the same dose of Ritalin 20 mg twice daily.   Able to focus and complete tasks without side effects.  She reports no problems with sleep.   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  3 post-dated printed prescriptions given to patient due to medication shortage  /76 (BP Location: Left arm, Patient Position: Sitting)   Pulse 75   Temp 97.7 °F (36.5 °C) (Temporal)   Ht 5' 2" (1.575 m)   Wt 62.2 kg (137 lb 2 oz)   LMP 12/07/2024   SpO2 97%   BMI 25.08 kg/m²   Follow up in 3 months.     Stress Reaction  Patient diagnosed in July 2021 with acute stress reaction with increased anxiety and started on Zoloft 25 mg daily.    Increased the Zoloft to 50 mg daily May 2022.   Increased dose with complaints of decreased sexual drive/libido  7/9/2024:  STOPPED the Zoloft 50 mg daily and Started Wellbutrin  mg daily  Sexual side effects resolved but anxiety still present on Wellbutrin 150 mg dose  Increased to 300 mg daily 10/21/2024  Reports Anxiety is controlled and sexual side effects resolved.  Follow up in 3 months.       Recurrent cold sores  takes Valtrex as needed.      Left Ear Pain  Started around 1 month ago  Went to " "[facility] 12/11/2024:  Treated with Augmentin and Flonase  Went away after a few days of treatment  Reports she still now has a dull ache to left ear present  Reports mild chronic nasal congestion intermittently.  Ear exam unremarkable - normal.  Suspect intermittent eustachian tube dysfunction due to chronic nasal congestion/drip/allergies - advised to use Flonase nasal spray daily, Sudafed and Ibuprofen prn symptoms.         Vitals:    01/02/25 1005   BP: 110/76   BP Location: Left arm   Patient Position: Sitting   Pulse: 75   Temp: 97.7 °F (36.5 °C)   TempSrc: Temporal   SpO2: 97%   Weight: 62.2 kg (137 lb 2 oz)   Height: 5' 2" (1.575 m)         Diagnoses this Encounter:         ICD-10-CM ICD-9-CM   1. Attention deficit hyperactivity disorder (ADHD), combined type  F90.2 314.01   2. 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 Otalgia of left ear  H92.02 388.70   4. Colon cancer screening  Z12.11 V76.51       Orders Placed This Encounter    Ambulatory referral/consult to Endo Procedure     methylphenidate HCl (RITALIN) 20 MG tablet    methylphenidate HCl (RITALIN) 20 MG tablet    methylphenidate HCl (RITALIN) 20 MG tablet        Follow up in about 3 months (around 4/2/2025) for med check.     Patient's Medications   New Prescriptions    No medications on file   Previous Medications    BUPROPION (WELLBUTRIN XL) 300 MG 24 HR TABLET    Take 1 tablet (300 mg total) by mouth once daily.    FLUTICASONE PROPIONATE (FLONASE) 50 MCG/ACTUATION NASAL SPRAY    Inhale 1 spray in Each Nostril  once daily.    VALACYCLOVIR (VALTREX) 1000 MG TABLET    Take 2 tablets at onset of cold sore and repeat in 12 hours for 1 additional dose as need for cold sore outbreak   Modified Medications    Modified Medication Previous Medication    METHYLPHENIDATE HCL (RITALIN) 20 MG TABLET methylphenidate HCl (RITALIN) 20 MG tablet       Take 1 tablet (20 mg total) by mouth 2 (two) times " daily.    Take 1 tablet (20 mg total) by mouth 2 (two) times daily.    METHYLPHENIDATE HCL (RITALIN) 20 MG TABLET methylphenidate HCl (RITALIN) 20 MG tablet       Take 1 tablet (20 mg total) by mouth 2 (two) times daily.    Take 1 tablet (20 mg total) by mouth 2 (two) times daily.    METHYLPHENIDATE HCL (RITALIN) 20 MG TABLET methylphenidate HCl (RITALIN) 20 MG tablet       Take 1 tablet (20 mg total) by mouth 2 (two) times daily.    Take 1 tablet (20 mg total) by mouth 2 (two) times daily.   Discontinued Medications    No medications on file         Review of Systems   HENT:  Positive for congestion and ear pain.    Respiratory: Negative.     Cardiovascular: Negative.    Gastrointestinal: Negative.          Objective:        Physical Exam  Constitutional:       General: She is not in acute distress.     Appearance: Normal appearance. She is normal weight. She is not toxic-appearing or diaphoretic.      Comments: Body mass index is 25.08 kg/m².       HENT:      Right Ear: Tympanic membrane, ear canal and external ear normal.      Left Ear: Tympanic membrane, ear canal and external ear normal.      Nose: Congestion present. No rhinorrhea.      Right Turbinates: Pale.      Left Turbinates: Pale.      Mouth/Throat:      Pharynx: No oropharyngeal exudate or posterior oropharyngeal erythema.      Comments: + postnasal drip  Eyes:      Extraocular Movements: Extraocular movements intact.      Conjunctiva/sclera: Conjunctivae normal.   Cardiovascular:      Rate and Rhythm: Normal rate and regular rhythm.      Heart sounds: Normal heart sounds.   Pulmonary:      Effort: Pulmonary effort is normal. No respiratory distress.      Breath sounds: Normal breath sounds.   Neurological:      Mental Status: She is alert and oriented to person, place, and time.   Psychiatric:         Mood and Affect: Mood normal.         Behavior: Behavior normal.           Past Medical History:   Diagnosis Date    Abnormal Pap smear of cervix 2000     prior to being a pt     ADHD (attention deficit hyperactivity disorder)     Diagnosed by psychologist in [place] in the 10th grade and was on Ritalin from 10th to 12 grade and got off med; she then did not start back on ADHD medication until 2018 with Dr. Linda Pineda here in office    Allergy     Anxiety     Anxiety     Fever blister     HPV (human papilloma virus) infection        Past Surgical History:   Procedure Laterality Date    AUGMENTATION OF BREAST      BREAST SURGERY      breast augmentation in ; and then in  had an implant deflate that needed repair     SECTION      replacement of  breast implant       due to rupture     RHINOPLASTY         Family History   Adopted: Yes   Problem Relation Name Age of Onset    No Known Problems Sister      No Known Problems Brother      No Known Problems Daughter      ADD / ADHD Son      Strabismus Son         Social History     Socioeconomic History    Marital status:    Occupational History    Occupation: works curriculum advisor   Tobacco Use    Smoking status: Former     Current packs/day: 0.00     Average packs/day: 0.3 packs/day for 6.7 years (1.7 ttl pk-yrs)     Types: Cigarettes     Start date: 2003     Quit date: 2009     Years since quitting: 15.3     Passive exposure: Past    Smokeless tobacco: Never   Substance and Sexual Activity    Alcohol use: Yes     Alcohol/week: 0.0 - 1.0 standard drinks of alcohol     Comment: once a month - 1 to 2 drinks per occasion    Drug use: No    Sexual activity: Yes     Partners: Male     Birth control/protection: Partner-Vasectomy   Other Topics Concern    Are you pregnant or think you may be? No    Breast-feeding No   Social History Narrative    .  2 children     Social Drivers of Health     Financial Resource Strain: Low Risk  (2022)    Overall Financial Resource Strain (CARDIA)     Difficulty of Paying Living Expenses: Not hard at all   Food  Insecurity: No Food Insecurity (6/6/2022)    Hunger Vital Sign     Worried About Running Out of Food in the Last Year: Never true     Ran Out of Food in the Last Year: Never true   Transportation Needs: No Transportation Needs (6/6/2022)    PRAPARE - Transportation     Lack of Transportation (Medical): No     Lack of Transportation (Non-Medical): No   Physical Activity: Insufficiently Active (6/6/2022)    Exercise Vital Sign     Days of Exercise per Week: 2 days     Minutes of Exercise per Session: 30 min   Stress: Stress Concern Present (6/6/2022)    [employer] [place] [employer] - Occupational Stress Questionnaire     Feeling of Stress : To some extent   Housing Stability: Low Risk  (6/6/2022)    Housing Stability Vital Sign     Unable to Pay for Housing in the Last Year: No     Number of Places Lived in the Last Year: 1     Unstable Housing in the Last Year: No

## 2025-01-09 ENCOUNTER — OFFICE VISIT (OUTPATIENT)
Dept: URGENT CARE | Facility: CLINIC | Age: 45
End: 2025-01-09
Payer: COMMERCIAL

## 2025-01-09 VITALS
SYSTOLIC BLOOD PRESSURE: 140 MMHG | HEART RATE: 78 BPM | BODY MASS INDEX: 24.84 KG/M2 | DIASTOLIC BLOOD PRESSURE: 83 MMHG | WEIGHT: 135 LBS | OXYGEN SATURATION: 100 % | TEMPERATURE: 98 F | RESPIRATION RATE: 16 BRPM | HEIGHT: 62 IN

## 2025-01-09 DIAGNOSIS — J20.9 ACUTE BRONCHITIS, UNSPECIFIED ORGANISM: Primary | ICD-10-CM

## 2025-01-09 DIAGNOSIS — R05.1 ACUTE COUGH: ICD-10-CM

## 2025-01-09 PROCEDURE — 99213 OFFICE O/P EST LOW 20 MIN: CPT | Mod: S$GLB,,, | Performed by: PHYSICIAN ASSISTANT

## 2025-01-09 PROCEDURE — 71046 X-RAY EXAM CHEST 2 VIEWS: CPT | Mod: S$GLB,,, | Performed by: RADIOLOGY

## 2025-01-09 RX ORDER — PREDNISONE 20 MG/1
40 TABLET ORAL DAILY
Qty: 8 TABLET | Refills: 0 | Status: SHIPPED | OUTPATIENT
Start: 2025-01-09 | End: 2025-01-13

## 2025-01-09 RX ORDER — ALBUTEROL SULFATE 90 UG/1
2 INHALANT RESPIRATORY (INHALATION) EVERY 6 HOURS PRN
Qty: 6.7 G | Refills: 0 | Status: SHIPPED | OUTPATIENT
Start: 2025-01-09

## 2025-01-09 NOTE — PATIENT INSTRUCTIONS
You have been prescribed a steroid today. Take the prescription as directed. Steroids can increase blood sugar. You can also have the following when taking steroids: flushing, jitteriness, weight gain, fluid retention, bone weakening. If you develop any adverse symptoms, stop taking the medication immediately.    Thank you for choosing [facility]!     Our goal in the Urgent Care is to always provide outstanding medical care. You may receive a survey by mail or e-mail in the next week regarding your experience today. We would greatly appreciate you completing and returning the survey. Your feedback provides us with a way to recognize our staff who provide very good care, and it helps us learn how to improve when your experience was below our aspiration of excellence.       We appreciate you trusting us with your medical care. We hope you feel better soon. We will be happy to take care of you for all of your future medical needs.    You must understand that you've received an Urgent Care treatment only and that you may be released before all your medical problems are known or treated. You, the patient, will arrange for follow up care as instructed.      Follow up with your PCP or specialty clinic as instructed in the next 2-3 days if not improved or as needed. You can call [phone] to schedule an appointment with appropriate provider.      If you condition worsens, we recommend that you receive another evaluation at the emergency room immediately or contact your primary medical clinic's after hours call service to discuss your concerns.      Please return here or go to the Emergency Department for any concerns or worsening condition.

## 2025-01-09 NOTE — PROGRESS NOTES
"Subjective:      Patient ID: Karolyn Campbell is a 44 y.o. female.    Vitals:  height is 5' 2" (1.575 m) and weight is 61.2 kg (135 lb). Her oral temperature is 98.1 °F (36.7 °C). Her blood pressure is 140/83 (abnormal) and her pulse is 78. Her respiration is 16 and oxygen saturation is 100%.     Chief Complaint: Cough    Pt complains of cough and sob that started yesterday. Her daughter tested ositive for 4 the week before jesse. She said shortly after that she started having fatigue and body aches. The has resolved. Then 5 days ago she has nasal congestion and cough that has also resolved. Yesterday she started cough, more so when talking and trying to take in a deep breath.     Patient provider note starts here:  Patient presents with complaints of having a dry, nagging cough. Reports that she also feels like she is not aerating her lungs fully especially when talking, further described as becoming more easily winded with talking. She denies fevers, sputum production or chest pain. She denies asthma. Of note, she has been ill a few different times since Christmas with what she thought was the flu after being exposed to her daughter who tested positive for flu.     Cough  This is a new problem. The current episode started yesterday. The problem has been unchanged. The problem occurs constantly. The cough is Non-productive. Associated symptoms include postnasal drip. Pertinent negatives include no chest pain, chills, ear congestion, fever, heartburn, shortness of breath or wheezing. Exacerbated by: talking, deep breath. She has tried nothing for the symptoms. There is no history of asthma.       Constitution: Negative for chills and fever.   HENT:  Positive for congestion and postnasal drip.    Neck: Negative for neck pain.   Cardiovascular:  Negative for chest pain, palpitations and sob on exertion.   Respiratory:  Positive for cough. Negative for chest tightness, sputum production, shortness of breath, wheezing " and asthma.    Gastrointestinal:  Negative for abdominal pain, vomiting, diarrhea and heartburn.   Skin:  Negative for color change and wound.   Allergic/Immunologic: Negative for asthma.   Neurological:  Negative for numbness and tingling.      Objective:     Physical Exam   Constitutional: She is oriented to person, place, and time. She appears well-developed. She is cooperative.  Non-toxic appearance. She does not appear ill. No distress.   HENT:   Head: Normocephalic and atraumatic.   Ears:   Right Ear: Hearing and external ear normal.   Left Ear: Hearing and external ear normal.   Nose: Nose normal. No mucosal edema, rhinorrhea, nasal deformity or congestion. No epistaxis. Right sinus exhibits no maxillary sinus tenderness and no frontal sinus tenderness. Left sinus exhibits no maxillary sinus tenderness and no frontal sinus tenderness.   Mouth/Throat: Uvula is midline, oropharynx is clear and moist and mucous membranes are normal. No trismus in the jaw. Normal dentition. No uvula swelling. No oropharyngeal exudate, posterior oropharyngeal edema or posterior oropharyngeal erythema.   Eyes: Conjunctivae and lids are normal. No scleral icterus.   Neck: Trachea normal and phonation normal. Neck supple. No edema present. No erythema present. No neck rigidity present.   Cardiovascular: Normal rate, regular rhythm, normal heart sounds and normal pulses.   Pulmonary/Chest: Effort normal. No respiratory distress. She has no decreased breath sounds. She has wheezes (Faint expiratory wheeze noted. No respiratory distress noted.). She has no rhonchi.   Abdominal: Normal appearance.   Musculoskeletal: Normal range of motion.         General: No deformity. Normal range of motion.   Neurological: She is alert and oriented to person, place, and time. She exhibits normal muscle tone. Coordination normal.   Skin: Skin is warm, dry, intact, not diaphoretic and not pale.   Psychiatric: Her speech is normal and behavior is normal.  Judgment and thought content normal.   Nursing note and vitals reviewed.      Assessment:     1. Acute bronchitis, unspecified organism    2. Acute cough      XR CHEST PA AND LATERAL  Result Date: 1/9/2025  EXAMINATION: XR CHEST PA AND LATERAL CLINICAL HISTORY: Acute cough TECHNIQUE: PA and lateral views of the chest were performed. COMPARISON: None FINDINGS: The cardiac and mediastinal silhouettes appear within normal limits.   The lungs are clear bilaterally.  No acute osseous findings demonstrated.   No acute findings. Electronically signed by: Edward Rivers Date: 01/09/2025 Time: 15:26     Plan:       Acute bronchitis, unspecified organism  -     predniSONE (DELTASONE) 20 MG tablet; Take 2 tablets (40 mg total) by mouth once daily. for 4 days  Dispense: 8 tablet; Refill: 0  -     albuterol (PROVENTIL HFA) 90 mcg/actuation inhaler; Inhale 2 puffs into the lungs every 6 (six) hours as needed for Shortness of Breath or Wheezing. Rescue  Dispense: 6.7 g; Refill: 0    Acute cough  -     XR CHEST PA AND LATERAL; Future; Expected date: 01/09/2025          Medical Decision Making:   History:   Old Medical Records: I decided to obtain old medical records.  Old Records Summarized: records from clinic visits.  Independently Interpreted Test(s):   I have ordered and independently interpreted X-rays - see summary below.  Clinical Tests:   Radiological Study: Ordered and Reviewed  Urgent Care Management:  A. Problem List:   -Acute: Acute bronchitis    -Chronic: ADHD   B. Differential diagnosis: viral vs bacterial URI, pharyngitis, otitis, COVID 19, influenza, pneumonia, bronchitis   C. Diagnostic Testing Ordered: CXR   D. Diagnostic Testing Considered: None  E. Independent Historians: None  F. Urgent Care Midlevel Independent Results Interpretation: CXR reviewed and interpreted by me- no acute process noted   G. Radiology: See radiology report above   H. Review of Previous Medical Records: Evaluated here 12/15 and prescribed  Augmentin for otitis media. Then saw Natty Cruz and has symptomatic improvement at that time.   I. Home Medications Reviewed  J. Social Determinants of Health considered  K. Medical Decision Making and Disposition: Patient presents with complaints of a dry cough. On exam, she is afebrile and nontoxic appearing. Faint expiratory wheeze noted on exam but she is not in any respiratory distress. CXR looks clear. The risks and benefits of oral steroids was discussed with the patient in addition to albuterol inhaler. Advised follow-up with PCP and ED precautions. She verbalized understanding and agreed with plan.          Patient Instructions   You have been prescribed a steroid today. Take the prescription as directed. Steroids can increase blood sugar. You can also have the following when taking steroids: flushing, jitteriness, weight gain, fluid retention, bone weakening. If you develop any adverse symptoms, stop taking the medication immediately.    Thank you for choosing [facility]!     Our goal in the Urgent Care is to always provide outstanding medical care. You may receive a survey by mail or e-mail in the next week regarding your experience today. We would greatly appreciate you completing and returning the survey. Your feedback provides us with a way to recognize our staff who provide very good care, and it helps us learn how to improve when your experience was below our aspiration of excellence.       We appreciate you trusting us with your medical care. We hope you feel better soon. We will be happy to take care of you for all of your future medical needs.    You must understand that you've received an Urgent Care treatment only and that you may be released before all your medical problems are known or treated. You, the patient, will arrange for follow up care as instructed.      Follow up with your PCP or specialty clinic as instructed in the next 2-3 days if not improved or as needed. You can call  [phone] to schedule an appointment with appropriate provider.      If you condition worsens, we recommend that you receive another evaluation at the emergency room immediately or contact your primary medical clinic's after hours call service to discuss your concerns.      Please return here or go to the Emergency Department for any concerns or worsening condition.

## 2025-01-25 ENCOUNTER — OFFICE VISIT (OUTPATIENT)
Dept: URGENT CARE | Facility: CLINIC | Age: 45
End: 2025-01-25
Payer: COMMERCIAL

## 2025-01-25 VITALS
OXYGEN SATURATION: 99 % | DIASTOLIC BLOOD PRESSURE: 74 MMHG | WEIGHT: 135 LBS | HEART RATE: 97 BPM | BODY MASS INDEX: 24.84 KG/M2 | TEMPERATURE: 99 F | RESPIRATION RATE: 15 BRPM | SYSTOLIC BLOOD PRESSURE: 105 MMHG | HEIGHT: 62 IN

## 2025-01-25 DIAGNOSIS — J32.9 BACTERIAL SINUSITIS: ICD-10-CM

## 2025-01-25 DIAGNOSIS — R09.81 NASAL CONGESTION: Primary | ICD-10-CM

## 2025-01-25 DIAGNOSIS — B96.89 BACTERIAL SINUSITIS: ICD-10-CM

## 2025-01-25 DIAGNOSIS — R05.8 COUGH PRODUCTIVE OF PURULENT SPUTUM: ICD-10-CM

## 2025-01-25 LAB
CTP QC/QA: YES
SARS-COV-2 AG RESP QL IA.RAPID: NEGATIVE

## 2025-01-25 PROCEDURE — 87811 SARS-COV-2 COVID19 W/OPTIC: CPT | Mod: QW,S$GLB,, | Performed by: PHYSICIAN ASSISTANT

## 2025-01-25 PROCEDURE — 99213 OFFICE O/P EST LOW 20 MIN: CPT | Mod: S$GLB,,, | Performed by: PHYSICIAN ASSISTANT

## 2025-01-25 RX ORDER — FLUTICASONE PROPIONATE 50 MCG
1 SPRAY, SUSPENSION (ML) NASAL DAILY
Qty: 16 G | Refills: 3 | Status: SHIPPED | OUTPATIENT
Start: 2025-01-25 | End: 2025-01-31 | Stop reason: ALTCHOICE

## 2025-01-25 RX ORDER — VITAMIN A 3000 MCG
2 CAPSULE ORAL
Qty: 50 ML | Refills: 12 | Status: SHIPPED | OUTPATIENT
Start: 2025-01-25 | End: 2025-01-31 | Stop reason: ALTCHOICE

## 2025-01-25 RX ORDER — GUAIFENESIN AND DEXTROMETHORPHAN HYDROBROMIDE 1200; 60 MG/1; MG/1
1 TABLET, EXTENDED RELEASE ORAL 2 TIMES DAILY WITH MEALS
Qty: 20 TABLET | Refills: 0 | Status: SHIPPED | OUTPATIENT
Start: 2025-01-25 | End: 2025-01-31 | Stop reason: ALTCHOICE

## 2025-01-25 RX ORDER — DOXYCYCLINE 100 MG/1
100 CAPSULE ORAL
Qty: 20 CAPSULE | Refills: 0 | Status: SHIPPED | OUTPATIENT
Start: 2025-01-25 | End: 2025-02-04

## 2025-01-25 NOTE — PROGRESS NOTES
"Subjective:      Patient ID: Karolyn Campbell is a 44 y.o. female.    Vitals:  height is 5' 2" (1.575 m) and weight is 61.2 kg (135 lb). Her oral temperature is 99.2 °F (37.3 °C). Her blood pressure is 105/74 and her pulse is 97. Her respiration is 15 and oxygen saturation is 99%.     Chief Complaint: Nasal Congestion    Patient presents with sore throat and nasal congestion. She states symptom onset -2 days ago. Patient states she also started with nasal discharge and sinus pressure with cough today.  PATIENT COMPLAINS OF BLOWING GREEN NASAL DISCHARG ALSO COUGHING UP GREEN SPUTUM THAT IS GETTING WORSE DAILY DESPITE OVER-THE-COUNTER MEDICATIONS PUBIC    Sore Throat   This is a new problem. Episode onset: 2 days ago. The problem has been unchanged. There has been no fever. The pain is at a severity of 3/10. The pain is moderate. Associated symptoms include congestion and coughing. Pertinent negatives include no hoarse voice or swollen glands. She has tried nothing for the symptoms. The treatment provided no relief.       Constitution: Positive for fatigue.   HENT:  Positive for congestion, sinus pain, sinus pressure and sore throat.    Respiratory:  Positive for cough and sputum production.    Skin:  Negative for erythema.      Objective:     Physical Exam   Constitutional: She is oriented to person, place, and time. She appears well-developed. She is cooperative.  Non-toxic appearance. She does not appear ill. No distress.   HENT:   Head: Normocephalic and atraumatic.   Ears:   Right Ear: Hearing, tympanic membrane, external ear and ear canal normal.   Left Ear: Hearing, tympanic membrane, external ear and ear canal normal.   Nose: Rhinorrhea and congestion present. No mucosal edema or nasal deformity. No epistaxis. Right sinus exhibits no maxillary sinus tenderness and no frontal sinus tenderness. Left sinus exhibits no maxillary sinus tenderness and no frontal sinus tenderness.   Mouth/Throat: Uvula is midline and " mucous membranes are normal. No trismus in the jaw. Normal dentition. No uvula swelling. Posterior oropharyngeal erythema present. No oropharyngeal exudate or posterior oropharyngeal edema.   Eyes: Conjunctivae, EOM and lids are normal. Pupils are equal, round, and reactive to light. Right eye exhibits no discharge. Left eye exhibits no discharge. No scleral icterus.   Neck: Trachea normal and phonation normal. Neck supple. No JVD present. No tracheal deviation present. No thyromegaly present. No edema present. No erythema present. No neck rigidity present.   Cardiovascular: Normal rate, regular rhythm, normal heart sounds and normal pulses.   No murmur heard.Exam reveals no gallop and no friction rub.   Pulmonary/Chest: Effort normal and breath sounds normal. No stridor. No respiratory distress. She has no decreased breath sounds. She has no wheezes. She has no rhonchi. She has no rales. She exhibits no tenderness.   Abdominal: Normal appearance. She exhibits no distension. Soft. There is no abdominal tenderness. There is no rebound and no guarding.   Musculoskeletal: Normal range of motion.         General: No deformity. Normal range of motion.   Neurological: She is alert and oriented to person, place, and time. She exhibits normal muscle tone. Coordination normal.   Skin: Skin is warm, dry, intact, not diaphoretic, not pale and no rash. Capillary refill takes less than 2 seconds. No erythema   Psychiatric: Her speech is normal and behavior is normal. Judgment and thought content normal.   Nursing note and vitals reviewed.    Results for orders placed or performed in visit on 01/25/25   SARS Coronavirus 2 Antigen, POCT Manual Read    Collection Time: 01/25/25  4:36 PM   Result Value Ref Range    SARS Coronavirus 2 Antigen Negative Negative     Acceptable Yes        Assessment:     1. Nasal congestion    2. Cough productive of purulent sputum    3. Bacterial sinusitis        Plan:       Nasal  congestion  -     SARS Coronavirus 2 Antigen, POCT Manual Read  -     fluticasone propionate (FLONASE) 50 mcg/actuation nasal spray; 1 spray (50 mcg total) by Each Nostril route once daily.  Dispense: 16 g; Refill: 3  -     sodium chloride (SALINE NASAL) 0.65 % nasal spray; 2 sprays by Nasal route as needed for Congestion.  Dispense: 50 mL; Refill: 12    Cough productive of purulent sputum  -     dextromethorphan-guaiFENesin (MUCINEX DM) 60-1,200 mg per 12 hr tablet; Take 1 tablet by mouth 2 (two) times daily with meals. for 10 days  Dispense: 20 tablet; Refill: 0  -     doxycycline (VIBRAMYCIN) 100 MG Cap; Take 1 capsule (100 mg total) by mouth 2 times daily 2 hours after meal. for 10 days  Dispense: 20 capsule; Refill: 0    Bacterial sinusitis  -     sodium chloride (SALINE NASAL) 0.65 % nasal spray; 2 sprays by Nasal route as needed for Congestion.  Dispense: 50 mL; Refill: 12  -     dextromethorphan-guaiFENesin (MUCINEX DM) 60-1,200 mg per 12 hr tablet; Take 1 tablet by mouth 2 (two) times daily with meals. for 10 days  Dispense: 20 tablet; Refill: 0  -     doxycycline (VIBRAMYCIN) 100 MG Cap; Take 1 capsule (100 mg total) by mouth 2 times daily 2 hours after meal. for 10 days  Dispense: 20 capsule; Refill: 0      No follow-ups on file. There are no Patient Instructions on file for this visit.

## 2025-01-31 ENCOUNTER — OFFICE VISIT (OUTPATIENT)
Dept: FAMILY MEDICINE | Facility: CLINIC | Age: 45
End: 2025-01-31
Payer: COMMERCIAL

## 2025-01-31 VITALS
DIASTOLIC BLOOD PRESSURE: 78 MMHG | WEIGHT: 131.75 LBS | HEIGHT: 62 IN | OXYGEN SATURATION: 99 % | BODY MASS INDEX: 24.24 KG/M2 | SYSTOLIC BLOOD PRESSURE: 118 MMHG | HEART RATE: 98 BPM | TEMPERATURE: 98 F

## 2025-01-31 DIAGNOSIS — J06.9 UPPER RESPIRATORY TRACT INFECTION, UNSPECIFIED TYPE: Primary | ICD-10-CM

## 2025-01-31 LAB
CTP QC/QA: YES
CTP QC/QA: YES
FLUAV AG NPH QL: NEGATIVE
FLUBV AG NPH QL: NEGATIVE
SARS-COV-2 RDRP RESP QL NAA+PROBE: NEGATIVE

## 2025-01-31 PROCEDURE — 87804 INFLUENZA ASSAY W/OPTIC: CPT | Mod: QW,S$GLB,, | Performed by: NURSE PRACTITIONER

## 2025-01-31 PROCEDURE — 1160F RVW MEDS BY RX/DR IN RCRD: CPT | Mod: CPTII,S$GLB,, | Performed by: NURSE PRACTITIONER

## 2025-01-31 PROCEDURE — 3078F DIAST BP <80 MM HG: CPT | Mod: CPTII,S$GLB,, | Performed by: NURSE PRACTITIONER

## 2025-01-31 PROCEDURE — 99214 OFFICE O/P EST MOD 30 MIN: CPT | Mod: S$GLB,,, | Performed by: NURSE PRACTITIONER

## 2025-01-31 PROCEDURE — 99999 PR PBB SHADOW E&M-EST. PATIENT-LVL IV: CPT | Mod: PBBFAC,,, | Performed by: NURSE PRACTITIONER

## 2025-01-31 PROCEDURE — 87635 SARS-COV-2 COVID-19 AMP PRB: CPT | Mod: QW,S$GLB,, | Performed by: NURSE PRACTITIONER

## 2025-01-31 PROCEDURE — 1159F MED LIST DOCD IN RCRD: CPT | Mod: CPTII,S$GLB,, | Performed by: NURSE PRACTITIONER

## 2025-01-31 PROCEDURE — 3008F BODY MASS INDEX DOCD: CPT | Mod: CPTII,S$GLB,, | Performed by: NURSE PRACTITIONER

## 2025-01-31 PROCEDURE — 3074F SYST BP LT 130 MM HG: CPT | Mod: CPTII,S$GLB,, | Performed by: NURSE PRACTITIONER

## 2025-01-31 RX ORDER — LEVOCETIRIZINE DIHYDROCHLORIDE 5 MG/1
5 TABLET, FILM COATED ORAL NIGHTLY
Qty: 7 TABLET | Refills: 0 | Status: SHIPPED | OUTPATIENT
Start: 2025-01-31

## 2025-01-31 RX ORDER — BENZONATATE 200 MG/1
200 CAPSULE ORAL 3 TIMES DAILY PRN
Qty: 30 CAPSULE | Refills: 0 | Status: SHIPPED | OUTPATIENT
Start: 2025-01-31 | End: 2025-02-10

## 2025-01-31 RX ORDER — PROMETHAZINE HYDROCHLORIDE 6.25 MG/5ML
12.5 SYRUP ORAL NIGHTLY PRN
Qty: 90 ML | Refills: 0 | Status: SHIPPED | OUTPATIENT
Start: 2025-01-31

## 2025-01-31 NOTE — PROGRESS NOTES
Patient ID: Karolyn Campbell is a 44 y.o. female.     Chief Complaint: Cough (Worse, up all night), Nasal Congestion (Nasal and sinus congestion, started last Friday.), and Headache      HPI:  Mrs Campbell presents to the clinic for cough, headache, and nasal congestion. Mrs Campbell has been having URI type symptoms intermittently for the past month. Seen in Urgent care on 1/9/25 and diagnosed with Bronchitis, Covid negative and treated with prednisone and Albuterol inhaler. She only took one dose of Prednisone due to SE of restlessness and used the albuterol inhaler one time and did not find it helped. She had been taking OTC Childrens cough medication with some relief. She can not take Sudafed and Mucinex Dm due to palpitations. She worsened and returned to the Urgent care on 1/25/25 and was diagnosed with Bacterial Sinusitis, treated with Doxycycline and Mucinex Dm. She has been taking the Doxy as prescribed but had not been taking any cold medications until yesterday. She was feeling much better early in the week but started with a non productive cough all day yesterday. She took Childrens cold medication which helped a little but she coughed all night. She had some clear mucus. She woke this morning with headache and nasal congestion. She denies fever. She reports several co workers have been sick with the flu. She had flu last year and did not have fever.         Review of Systems  Review of Systems   Constitutional:  Positive for malaise/fatigue. Negative for chills, fever and weight loss.   HENT:  Positive for congestion and sore throat. Negative for ear pain and sinus pain.    Eyes:  Negative for blurred vision, double vision and photophobia.   Respiratory:  Positive for cough. Negative for sputum production, shortness of breath and wheezing.    Cardiovascular:  Negative for chest pain, palpitations, orthopnea and leg swelling.   Gastrointestinal:  Negative for abdominal pain, constipation, diarrhea, nausea and  vomiting.   Genitourinary:  Negative for dysuria, frequency and urgency.   Musculoskeletal:  Negative for back pain, joint pain and myalgias.   Skin:  Negative for rash.   Neurological:  Positive for headaches. Negative for weakness.   Endo/Heme/Allergies:  Negative for polydipsia.   Psychiatric/Behavioral:  Negative for depression. The patient is not nervous/anxious.        Currently Medications  Current Outpatient Medications on File Prior to Visit   Medication Sig Dispense Refill    buPROPion (WELLBUTRIN XL) 300 MG 24 hr tablet Take 1 tablet (300 mg total) by mouth once daily. 90 tablet 0    doxycycline (VIBRAMYCIN) 100 MG Cap Take 1 capsule (100 mg total) by mouth 2 times daily 2 hours after meal. for 10 days 20 capsule 0    [START ON 2/4/2025] methylphenidate HCl (RITALIN) 20 MG tablet Take 1 tablet (20 mg total) by mouth 2 (two) times daily. 60 tablet 0    [START ON 3/5/2025] methylphenidate HCl (RITALIN) 20 MG tablet Take 1 tablet (20 mg total) by mouth 2 (two) times daily. 60 tablet 0    valACYclovir (VALTREX) 1000 MG tablet Take 2 tablets at onset of cold sore and repeat in 12 hours for 1 additional dose as need for cold sore outbreak 30 tablet 3    [DISCONTINUED] albuterol (PROVENTIL HFA) 90 mcg/actuation inhaler Inhale 2 puffs into the lungs every 6 (six) hours as needed for Shortness of Breath or Wheezing. Rescue (Patient not taking: Reported on 1/31/2025) 6.7 g 0    [DISCONTINUED] dextromethorphan-guaiFENesin (MUCINEX DM) 60-1,200 mg per 12 hr tablet Take 1 tablet by mouth 2 (two) times daily with meals. for 10 days (Patient not taking: Reported on 1/31/2025) 20 tablet 0    [DISCONTINUED] fluticasone propionate (FLONASE) 50 mcg/actuation nasal spray 1 spray (50 mcg total) by Each Nostril route once daily. (Patient not taking: Reported on 1/31/2025) 16 g 3    [DISCONTINUED] methylphenidate HCl (RITALIN) 20 MG tablet Take 1 tablet (20 mg total) by mouth 2 (two) times daily. (Patient not taking: Reported  "on 2025) 60 tablet 0    [DISCONTINUED] sodium chloride (SALINE NASAL) 0.65 % nasal spray 2 sprays by Nasal route as needed for Congestion. (Patient not taking: Reported on 2025) 50 mL 12     No current facility-administered medications on file prior to visit.       Allergies  Review of patient's allergies indicates:   Allergen Reactions    Mucinex fast-max dm nightshift [triprolidine-dm-acetaminoph-gg] Palpitations    Sudafed [pseudoephedrine hcl] Palpitations        Health Maintenance  Health Maintenance Topics with due status: Not Due       Topic Last Completion Date    TETANUS VACCINE 07/15/2021    Cervical Cancer Screening 2022    Lipid Panel 10/16/2024    Mammogram 2024    RSV Vaccine (Age 60+ and Pregnant patients) Not Due          PMH:  Past Medical History:   Diagnosis Date    Abnormal Pap smear of cervix     prior to being a pt     ADHD (attention deficit hyperactivity disorder)     Diagnosed by psychologist in [place] in the 10th grade and was on Ritalin from 10th to 12 grade and got off med; she then did not start back on ADHD medication until 2018 with Dr. Linda Pineda here in office    Allergy     Anxiety     Anxiety     Fever blister     HPV (human papilloma virus) infection       Past Surgical History:   Procedure Laterality Date    AUGMENTATION OF BREAST      BREAST SURGERY      breast augmentation in ; and then in  had an implant deflate that needed repair     SECTION      replacement of  breast implant       due to rupture     RHINOPLASTY            Physical  Exam  Vitals:    25 0808   BP: 118/78   BP Location: Left arm   Patient Position: Sitting   Pulse: 98   Temp: 98.4 °F (36.9 °C)   TempSrc: Temporal   SpO2: 99%   Weight: 59.8 kg (131 lb 11.6 oz)   Height: 5' 2" (1.575 m)      Body mass index is 24.09 kg/m².  Wt Readings from Last 3 Encounters:   25 59.8 kg (131 lb 11.6 oz)   25 61.2 kg (135 lb)   25 " 61.2 kg (135 lb)     LMP 1/2/2025    Physical Exam  Vitals and nursing note reviewed.   Constitutional:       Appearance: Normal appearance.   HENT:      Head: Normocephalic.      Right Ear: Tympanic membrane and ear canal normal.      Left Ear: Tympanic membrane and ear canal normal.      Nose: Congestion and rhinorrhea present. Rhinorrhea is clear.      Mouth/Throat:      Lips: Pink.      Mouth: Mucous membranes are moist.      Pharynx: Posterior oropharyngeal erythema and postnasal drip present. No oropharyngeal exudate.      Tonsils: No tonsillar exudate.      Comments: Clear PND  Eyes:      Pupils: Pupils are equal, round, and reactive to light.   Cardiovascular:      Rate and Rhythm: Regular rhythm.      Pulses: Normal pulses.      Heart sounds: Normal heart sounds.   Pulmonary:      Effort: Pulmonary effort is normal.      Breath sounds: Normal breath sounds.   Musculoskeletal:         General: Normal range of motion.   Skin:     General: Skin is warm and dry.      Capillary Refill: Capillary refill takes less than 2 seconds.   Neurological:      General: No focal deficit present.      Mental Status: She is alert and oriented to person, place, and time. Mental status is at baseline.   Psychiatric:         Mood and Affect: Mood normal.         Behavior: Behavior normal.             Assessment/Plan:    1. Upper respiratory tract infection, unspecified type  Use Ibuprofen as needed for body aches, fever or chills  Honey, lemon tea, cough drops prn   Stay well hydrated, using water as well as electrolyte drinks.  Be sure to be eating a balanced diet and getting good rest.     -     levocetirizine (XYZAL) 5 MG tablet; Take 1 tablet (5 mg total) by mouth every evening.  Dispense: 7 tablet; Refill: 0  -     promethazine (PHENERGAN) 6.25 mg/5 mL syrup; Take 10 mLs (12.5 mg total) by mouth nightly as needed for Nausea.  Dispense: 90 mL; Refill: 0  -     benzonatate (TESSALON) 200 MG capsule; Take 1 capsule (200 mg  total) by mouth 3 (three) times daily as needed for Cough.  Dispense: 30 capsule; Refill: 0      -     POCT Influenza A/B- Negative   -     POCT COVID-19 Rapid Screening- Negative     Urgent care notes, labs and diagnostic reviewed           Follow up if symptoms worsen or fail to improve.     Izabel Arteaga, POLLY  [facility] [place]   1/31/2025

## 2025-02-11 ENCOUNTER — OFFICE VISIT (OUTPATIENT)
Dept: SURGERY | Facility: CLINIC | Age: 45
End: 2025-02-11
Payer: COMMERCIAL

## 2025-02-11 ENCOUNTER — TELEPHONE (OUTPATIENT)
Dept: SURGERY | Facility: CLINIC | Age: 45
End: 2025-02-11
Payer: COMMERCIAL

## 2025-02-11 VITALS
HEART RATE: 97 BPM | DIASTOLIC BLOOD PRESSURE: 88 MMHG | SYSTOLIC BLOOD PRESSURE: 124 MMHG | HEIGHT: 62 IN | BODY MASS INDEX: 24.09 KG/M2

## 2025-02-11 DIAGNOSIS — K64.5 HEMORRHOID THROMBOSIS: Primary | ICD-10-CM

## 2025-02-11 PROCEDURE — 46320 REMOVAL OF HEMORRHOID CLOT: CPT | Mod: S$GLB,,, | Performed by: NURSE PRACTITIONER

## 2025-02-11 PROCEDURE — 3079F DIAST BP 80-89 MM HG: CPT | Mod: CPTII,S$GLB,, | Performed by: NURSE PRACTITIONER

## 2025-02-11 PROCEDURE — 3074F SYST BP LT 130 MM HG: CPT | Mod: CPTII,S$GLB,, | Performed by: NURSE PRACTITIONER

## 2025-02-11 PROCEDURE — 1160F RVW MEDS BY RX/DR IN RCRD: CPT | Mod: CPTII,S$GLB,, | Performed by: NURSE PRACTITIONER

## 2025-02-11 PROCEDURE — 99204 OFFICE O/P NEW MOD 45 MIN: CPT | Mod: 25,S$GLB,, | Performed by: NURSE PRACTITIONER

## 2025-02-11 PROCEDURE — 1159F MED LIST DOCD IN RCRD: CPT | Mod: CPTII,S$GLB,, | Performed by: NURSE PRACTITIONER

## 2025-02-11 PROCEDURE — 3008F BODY MASS INDEX DOCD: CPT | Mod: CPTII,S$GLB,, | Performed by: NURSE PRACTITIONER

## 2025-02-11 PROCEDURE — 99999 PR PBB SHADOW E&M-EST. PATIENT-LVL III: CPT | Mod: PBBFAC,,, | Performed by: NURSE PRACTITIONER

## 2025-02-11 RX ORDER — HYDROCORTISONE 25 MG/G
CREAM TOPICAL 2 TIMES DAILY
Qty: 28 G | Refills: 1 | Status: SHIPPED | OUTPATIENT
Start: 2025-02-11

## 2025-02-11 NOTE — PROGRESS NOTES
CRS Office Visit History and Physical    Referring Md:   Self, Aaarefalexia  No address on file    SUBJECTIVE:     Chief Complaint: ***    History of Present Illness:  The patient is *** patient to this practice.   Course is as follows:  Patient is a 44 y.o. female presents with ***.  Symptoms have been present for ***.  Has tried ***.  Associated bleeding: {YES NO:}  Previous anorectal procedures: {YES/NO:}  {CONFIRMS/DENIES:22481} straining/prolonged time on toilet with bowel movements.  {Is:} currently taking fiber supplement or stool softener  Blood thinners: {YES:69036}    Last Colonoscopy: ***  Family history of colorectal cancer or IBD: ***.    Review of patient's allergies indicates:   Allergen Reactions    Mucinex fast-max dm nightshift [triprolidine-dm-acetaminoph-gg] Palpitations    Sudafed [pseudoephedrine hcl] Palpitations       Past Medical History:   Diagnosis Date    Abnormal Pap smear of cervix     prior to being a pt     ADHD (attention deficit hyperactivity disorder)     Diagnosed by psychologist in [place] in the 10th grade and was on Ritalin from 10th to 12 grade and got off med; she then did not start back on ADHD medication until 2018 with Dr. Linda Pineda here in office    Allergy     Anxiety     Anxiety     Fever blister     HPV (human papilloma virus) infection      Past Surgical History:   Procedure Laterality Date    AUGMENTATION OF BREAST      BREAST SURGERY      breast augmentation in ; and then in  had an implant deflate that needed repair     SECTION      replacement of  breast implant       due to rupture     RHINOPLASTY       Family History   Adopted: Yes   Problem Relation Name Age of Onset    No Known Problems Sister      No Known Problems Brother      No Known Problems Daughter      ADD / ADHD Son      Strabismus Son       Social History     Tobacco Use    Smoking status: Former     Current  packs/day: 0.00     Average packs/day: 0.3 packs/day for 6.7 years (1.7 ttl pk-yrs)     Types: Cigarettes     Start date: 1/1/2003     Quit date: 9/1/2009     Years since quitting: 15.4     Passive exposure: Past    Smokeless tobacco: Never   Substance Use Topics    Alcohol use: Yes     Alcohol/week: 0.0 - 1.0 standard drinks of alcohol     Comment: once a month - 1 to 2 drinks per occasion    Drug use: No        Review of Systems:  ROS    OBJECTIVE:     Vital Signs (Most Recent)  There were no vitals taken for this visit.    Physical Exam:  General: White female in no distress   Neuro: Alert and oriented to person, place, and time.  Moves all extremities.     HEENT: No icterus.  Trachea midline  Respiratory: Respirations are even and unlabored, no cough or audible wheezing  Abdomen: ***  Skin: Warm dry and intact, No visible rashes, no jaundice    Labs reviewed today:  Lab Results   Component Value Date    WBC 4.89 10/16/2024    HGB 12.7 10/16/2024    HCT 38.9 10/16/2024     10/16/2024    CHOL 173 10/16/2024    TRIG 78 10/16/2024    HDL 61 10/16/2024    ALT 12 10/16/2024    AST 15 10/16/2024     10/16/2024    K 4.3 10/16/2024     10/16/2024    CREATININE 0.8 10/16/2024    BUN 15 10/16/2024    CO2 23 10/16/2024    TSH 1.816 10/16/2024    HGBA1C 5.1 10/16/2024       Imaging reviewed today:  ***    Endoscopy reviewed today:  ***    Anorectal Exam:    Anal Skin: {CRS Anal Skin Physical Exam:21486}    Digital Rectal Exam:  Resting Tone {desc; low/normal/high/v high:78227}  Squeeze {desc; low/normal/high/v high:08733}  Relaxation with bear down {DESC; PRESENT/ABSENT:29619}  Mass {NONE:39737}  Rectocele  {DESC; PRESENT/ABSENT:85312}  Tenderness  {DESC; PRESENT/ABSENT:81333}    Anoscopy:  Verbal consent was obtained.   Clear plastic anoscope inserted.    Hemorrhoids  {DESC; PRESENT/ABSENT:00834}  Stigmata of bleeding  {DESC; PRESENT/ABSENT:84516}  Stigmata of prolapsed  {DESC;  PRESENT/ABSENT:68880}  Distal rectal mucosa {normal, inflamed, ulcerated, radiation changes:08780}    Rubber Band Ligation:  Suction applicator was placed above the dentate line.   Rubber bands were deployed in the *** position.    Patient tolerated the procedure well.       ASSESSMENT/PLAN:     There are no diagnoses linked to this encounter.    The patient was instructed to:  Increase water intake to at least 8-10 glasses of water per day.  Take a daily fiber supplement (Konsyl, Benefiber, Metamucil) and increase dietary intake to 20-30 grams/day.  Avoid straining or spending >5min/event with bowel movements.  Follow-up in clinic in 6-8 weeks.    SUMA Rodríguez  Colon and Rectal Surgery

## 2025-02-11 NOTE — PROGRESS NOTES
CRS Office Visit History and Physical    Referring Md:   Self, Aaareferral  No address on file    SUBJECTIVE:     Chief Complaint: pain and bleeding with a BM that started     History of Present Illness:  The patient is a new patient to this practice.   Course is as follows:  Patient is a 44 y.o. female presents with pain and bleeding with a BM that started .  Has tried prep H pads.  Associated bleeding: scant with wiping  Previous anorectal procedures: No  denies straining/prolonged time on toilet with bowel movements.  is not currently taking fiber supplement or stool softener  Blood thinners: No    Last Colonoscopy: never, but states it is in the works and she states she is working w PCP for this  Family history of colorectal cancer or IBD: no.    Review of patient's allergies indicates:   Allergen Reactions    Mucinex fast-max dm nightshift [triprolidine-dm-acetaminoph-gg] Palpitations    Sudafed [pseudoephedrine hcl] Palpitations       Past Medical History:   Diagnosis Date    Abnormal Pap smear of cervix     prior to being a pt     ADHD (attention deficit hyperactivity disorder)     Diagnosed by psychologist in [place] in the 10th grade and was on Ritalin from 10th to 12 grade and got off med; she then did not start back on ADHD medication until 2018 with Dr. Linda Pineda here in office    Allergy     Anxiety     Anxiety     Fever blister     HPV (human papilloma virus) infection      Past Surgical History:   Procedure Laterality Date    AUGMENTATION OF BREAST      BREAST SURGERY      breast augmentation in ; and then in  had an implant deflate that needed repair     SECTION      replacement of  breast implant       due to rupture     RHINOPLASTY       Family History   Adopted: Yes   Problem Relation Name Age of Onset    No Known Problems Sister      No Known Problems Brother      No Known Problems Daughter      ADD / ADHD Son      Strabismus  "Son       Social History     Tobacco Use    Smoking status: Former     Current packs/day: 0.00     Average packs/day: 0.3 packs/day for 6.7 years (1.7 ttl pk-yrs)     Types: Cigarettes     Start date: 1/1/2003     Quit date: 9/1/2009     Years since quitting: 15.4     Passive exposure: Past    Smokeless tobacco: Never   Substance Use Topics    Alcohol use: Yes     Alcohol/week: 0.0 - 1.0 standard drinks of alcohol     Comment: once a month - 1 to 2 drinks per occasion    Drug use: No        Review of Systems:  ROS    OBJECTIVE:     Vital Signs (Most Recent)  /88 (BP Location: Left arm, Patient Position: Sitting)   Pulse 97   Ht 5' 2.01" (1.575 m)   BMI 24.09 kg/m²     Physical Exam:  General: White female in no distress   Neuro: Alert and oriented to person, place, and time.  Moves all extremities.     HEENT: No icterus.  Trachea midline  Respiratory: Respirations are even and unlabored, no cough or audible wheezing  Abdomen: na  Skin: Warm dry and intact, No visible rashes, no jaundice    Labs reviewed today:  Lab Results   Component Value Date    WBC 4.89 10/16/2024    HGB 12.7 10/16/2024    HCT 38.9 10/16/2024     10/16/2024    CHOL 173 10/16/2024    TRIG 78 10/16/2024    HDL 61 10/16/2024    ALT 12 10/16/2024    AST 15 10/16/2024     10/16/2024    K 4.3 10/16/2024     10/16/2024    CREATININE 0.8 10/16/2024    BUN 15 10/16/2024    CO2 23 10/16/2024    TSH 1.816 10/16/2024    HGBA1C 5.1 10/16/2024         Anorectal Exam:    Anal Skin:  R lateral small thrombosed hemorrhoid    Post Thrombosed External Hemorrhoid Excision    1. Pt gave verbal informed consent for excision of thrombosed external hemorrhoid    2. Area cleansed with betadine, anesthetized with subcutaneous infiltration of 1% lidocaine with epi, and incised with a 11 scalpel, the blood clot was extracted with curved scissors and gel-foam was placed to achieve hemostasis. The area was then covered with dry " gauze.      ASSESSMENT/PLAN:     Diagnoses and all orders for this visit:    Hemorrhoid thrombosis  -     hydrocortisone (ANUSOL-HC) 2.5 % rectal cream; Place rectally 2 (two) times daily.    43 yo F here with a thrombosed hemorrhoid, it has been present >3 days, so we discussed excision today may or may not be beneficial. Benefits vs risks discussed and she opted to have it excised. Tolerated well    The patient was instructed to:  Warm soaks  Anusol  Nsaid    F/u PRN    SUMA Rodríguez  Colon and Rectal Surgery

## 2025-02-18 ENCOUNTER — OFFICE VISIT (OUTPATIENT)
Dept: FAMILY MEDICINE | Facility: CLINIC | Age: 45
End: 2025-02-18
Payer: COMMERCIAL

## 2025-02-18 DIAGNOSIS — B30.9 ACUTE VIRAL CONJUNCTIVITIS OF BOTH EYES: Primary | ICD-10-CM

## 2025-02-18 RX ORDER — OFLOXACIN 3 MG/ML
1 SOLUTION/ DROPS OPHTHALMIC 4 TIMES DAILY
Qty: 5 ML | Refills: 0 | Status: SHIPPED | OUTPATIENT
Start: 2025-02-18

## 2025-02-18 NOTE — PROGRESS NOTES
VIRTUAL/TELEMEDICINE VISIT   [facility]       The patient location is: [place]  The chief complaint leading to consultation is: Eye redness and drainage     Visit type: Virtual visit with synchronous audio and video   Total time spent: 21 minute  Each patient to whom he or she provides medical services by telemedicine is:  (1) informed of the relationship between the physician and patient and the respective role of any other health care provider with respect to management of the patient; and (2) notified that he or she may decline to receive medical services by telemedicine and may withdraw from such care at any time.    Reason for visit: Eye redness and drainage       SUBJECTIVE: Karolyn Campbell is a 44 y.o. female  Mrs Campbell presents for virtual visit for c/o reddened eyes with sticky discharge from both x 3 days. She was sen and treated for Uri on 1/31/25 which she reports has almost completely resolved except for some nasal drainage and occasional cough. She denies any known exposure to conjunctivitis but does report her daughter started with similar symptoms yesterday. She has not been using any OTC drops but has been taking anti histamine which has helped some. She reports the drainage is mainly clear but at times is whitish and sticky. Her lashes were stuck together the last few mornings. There is redness of both and itchy feeling. She denies visual changes.         Review of Systems   Constitutional:  Negative for chills, fever, malaise/fatigue and weight loss.   HENT:  Negative for congestion, ear pain, hearing loss, sinus pain and sore throat.    Eyes:  Positive for discharge and redness. Negative for blurred vision, double vision, photophobia and pain.   Respiratory:  Negative for cough, shortness of breath and wheezing.    Cardiovascular:  Negative for chest pain, palpitations and leg swelling.   Gastrointestinal:  Negative for blood in stool, constipation, diarrhea, nausea and vomiting.    Genitourinary:  Negative for dysuria and hematuria.   Musculoskeletal:  Negative for back pain and neck pain.   Skin:  Negative for rash.   Neurological:  Negative for dizziness, weakness and headaches.   Endo/Heme/Allergies:  Negative for polydipsia.         HISTORY:   Past Medical History:   Diagnosis Date    Abnormal Pap smear of cervix     prior to being a pt     ADHD (attention deficit hyperactivity disorder)     Diagnosed by psychologist in [place] in the 10th grade and was on Ritalin from 10th to 12 grade and got off med; she then did not start back on ADHD medication until 2018 with Dr. Linda Pineda here in office    Allergy     Anxiety     Anxiety     Fever blister     HPV (human papilloma virus) infection        Past Surgical History:   Procedure Laterality Date    AUGMENTATION OF BREAST      BREAST SURGERY      breast augmentation in ; and then in  had an implant deflate that needed repair     SECTION      replacement of  breast implant       due to rupture     RHINOPLASTY         Family History   Adopted: Yes   Problem Relation Name Age of Onset    No Known Problems Sister      No Known Problems Brother      No Known Problems Daughter      ADD / ADHD Son      Strabismus Son         Social History[1]    Social History     Social History Narrative    .  2 children       ALLERGIES:   Review of patient's allergies indicates:   Allergen Reactions    Mucinex fast-max dm nightshift [triprolidine-dm-acetaminoph-gg] Palpitations    Sudafed [pseudoephedrine hcl] Palpitations       MEDS:   Current Outpatient Medications on File Prior to Visit   Medication Sig Dispense Refill Last Dose/Taking    buPROPion (WELLBUTRIN XL) 300 MG 24 hr tablet Take 1 tablet (300 mg total) by mouth once daily. 90 tablet 0     hydrocortisone (ANUSOL-HC) 2.5 % rectal cream Place rectally 2 (two) times daily. 28 g 1     levocetirizine (XYZAL) 5 MG tablet Take 1 tablet (5 mg total)  by mouth every evening. 7 tablet 0     methylphenidate HCl (RITALIN) 20 MG tablet Take 1 tablet (20 mg total) by mouth 2 (two) times daily. 60 tablet 0     [START ON 3/5/2025] methylphenidate HCl (RITALIN) 20 MG tablet Take 1 tablet (20 mg total) by mouth 2 (two) times daily. 60 tablet 0     promethazine (PHENERGAN) 6.25 mg/5 mL syrup Take 10 mLs (12.5 mg total) by mouth nightly as needed for Nausea. 90 mL 0     valACYclovir (VALTREX) 1000 MG tablet Take 2 tablets at onset of cold sore and repeat in 12 hours for 1 additional dose as need for cold sore outbreak 30 tablet 3        Vital signs:   There were no vitals filed for this visit.  There is no height or weight on file to calculate BMI.    PHYSICAL EXAM:     Physical Exam  Constitutional:       Appearance: Normal appearance.   HENT:      Head: Normocephalic and atraumatic.   Eyes:      General: Lids are normal. Vision grossly intact.      Extraocular Movements: Extraocular movements intact.      Conjunctiva/sclera:      Right eye: Right conjunctiva is injected.      Left eye: Left conjunctiva is injected.      Comments: Clear eye drainage bilateral    Pulmonary:      Effort: Pulmonary effort is normal.   Skin:     Coloration: Skin is not pale.   Neurological:      Mental Status: She is alert and oriented to person, place, and time.   Psychiatric:         Mood and Affect: Mood normal.           PERTINENT RESULTS:   No visits with results within 1 Week(s) from this visit.   Latest known visit with results is:   Office Visit on 01/31/2025   Component Date Value Ref Range Status    Rapid Influenza A Ag 01/31/2025 Negative  Negative Final    Rapid Influenza B Ag 01/31/2025 Negative  Negative Final     Acceptable 01/31/2025 Yes   Final    POC Rapid COVID 01/31/2025 Negative  Negative Final     Acceptable 01/31/2025 Yes   Final       ASSESSMENT/PLAN:    1. Acute viral conjunctivitis of both eyes  Avoid touching eyes  Strict  handwashing  Take Xyzal daily  Benadryl at night prn   Warm compresses tid     -     ofloxacin (OCUFLOX) 0.3 % ophthalmic solution; Place 1 drop into both eyes 4 (four) times daily.  Dispense: 5 mL; Refill: 0          ORDERS:   Orders Placed This Encounter    ofloxacin (OCUFLOX) 0.3 % ophthalmic solution         No follow-ups on file. or sooner with any concerns        SUMA Malik  [facility]  2/18/2025      Answers submitted by the patient for this visit:  Review of Systems Questionnaire (Submitted on 2/18/2025)  activity change: No  unexpected weight change: No  rhinorrhea: No  trouble swallowing: No  visual disturbance: Yes  chest tightness: No  polyuria: No  difficulty urinating: No  menstrual problem: No  joint swelling: No  arthralgias: No  confusion: No  dysphoric mood: No         [1]   Social History  Tobacco Use    Smoking status: Former     Current packs/day: 0.00     Average packs/day: 0.3 packs/day for 6.7 years (1.7 ttl pk-yrs)     Types: Cigarettes     Start date: 1/1/2003     Quit date: 9/1/2009     Years since quitting: 15.4     Passive exposure: Past    Smokeless tobacco: Never   Substance Use Topics    Alcohol use: Yes     Alcohol/week: 0.0 - 1.0 standard drinks of alcohol     Comment: once a month - 1 to 2 drinks per occasion    Drug use: No

## 2025-02-28 ENCOUNTER — OFFICE VISIT (OUTPATIENT)
Dept: FAMILY MEDICINE | Facility: CLINIC | Age: 45
End: 2025-02-28
Payer: COMMERCIAL

## 2025-02-28 VITALS
HEIGHT: 62 IN | WEIGHT: 133.19 LBS | HEART RATE: 85 BPM | OXYGEN SATURATION: 100 % | RESPIRATION RATE: 16 BRPM | SYSTOLIC BLOOD PRESSURE: 118 MMHG | DIASTOLIC BLOOD PRESSURE: 80 MMHG | BODY MASS INDEX: 24.51 KG/M2 | TEMPERATURE: 98 F

## 2025-02-28 DIAGNOSIS — H15.103 SCLERITIS AND EPISCLERITIS OF BOTH EYES: Primary | ICD-10-CM

## 2025-02-28 DIAGNOSIS — H15.003 SCLERITIS AND EPISCLERITIS OF BOTH EYES: Primary | ICD-10-CM

## 2025-02-28 PROCEDURE — 99999 PR PBB SHADOW E&M-EST. PATIENT-LVL IV: CPT | Mod: PBBFAC,,, | Performed by: STUDENT IN AN ORGANIZED HEALTH CARE EDUCATION/TRAINING PROGRAM

## 2025-02-28 RX ORDER — PREDNISOLONE ACETATE 10 MG/ML
1 SUSPENSION/ DROPS OPHTHALMIC 4 TIMES DAILY
Qty: 10 ML | Refills: 0 | Status: SHIPPED | OUTPATIENT
Start: 2025-02-28 | End: 2025-03-20

## 2025-02-28 NOTE — PROGRESS NOTES
[facility] Note    Chief Complaint      Chief Complaint   Patient presents with    Eye Drainage     Swelling with drainage, crust in the morning bilateral since 02/16/24; finished the antibiotic drops.  Tried OTC drops without relief. Excessive blinking.     History of Present Illness     Karolyn presents with persistent eye irritation, discharge, and vision changes for the past two weeks. Karolyn reports eye symptoms that started approximately 2 weeks ago. Initially, she noticed a thick yellow discharge from her eyes, followed by irritation. The discharge has since become mostly watery, but still contains some yellow material that accumulates during the day. She has watery eyes throughout the day and reports that her eyelids feel heavy, causing frequent blinking. Her eyes become more irritated as the day progresses.    Her vision has been affected, particularly when looking at computer screens at work, which appear blurry. This vision change has been recent. She does not wear contact lenses but does have glasses primarily for night driving.    She has tried several treatments without significant improvement. She used OTC allergy eye drops and allergy medications, including Claritin and Zyrtec, before switching to Xyzal as prescribed. She also started antibiotic eye drops after a virtual consultation with a nurse named Izabel. The antibiotic drops were initially used 4 times daily, but due to irritation, she reduced usage to morning and night. She reports that all eye drops, including the antibiotics, cause increased irritation for about an hour after application.    She mentions a previous eye screening a few years ago that showed optic nerve damage, raising concerns about glaucoma. It was suggested that this might have been congenital and not progressing. She denies any other eye issues since then until the current symptoms began.    She denies fever, chills, ear pain, headaches, gritty sensation  in the eyes, and severe pain behind her eyes.    MEDICATIONS:  - Antibiotic eye drops, 4 times daily initially, reduced to morning and night, for eye infection, not effective, causes irritation  - Xyzal, taken nightly, for allergies  - Over-the-counter allergy eye drops, used for eye irritation, not effective  - Discontinued Claritin for allergies due to ineffectiveness  - Discontinued Zyrtec for allergies due to ineffectiveness    MEDICAL HISTORY:  - Optic nerve damage: Diagnosed a few years ago, initially concerning for glaucoma but progression was not noted    TEST RESULTS:  - Eye exam: About a year ago  - Glaucoma screening: A couple of years ago, showed optic nerve damage, concern about glaucoma but it was not progressing, possibly congenital      ROS:  General: -fever, -chills, -fatigue, -weight gain, -weight loss  Eyes: +vision changes, -redness, +discharge, -eye pain  ENT: -ear pain, -nasal congestion, -sore throat  Cardiovascular: -chest pain, -palpitations, -lower extremity edema  Respiratory: -cough, -shortness of breath  Gastrointestinal: -abdominal pain, -nausea, -vomiting, -diarrhea, -constipation, -blood in stool  Genitourinary: -dysuria, -hematuria, -frequency  Musculoskeletal: -joint pain, -muscle pain  Skin: -rash, -lesion  Neurological: -headache, -dizziness, -numbness, -tingling  Psychiatric: -anxiety, -depression, -sleep difficulty          Karolyn Campbell is a 44 y.o. female who presents with:    Problem List Addressed This Visit:  1. Scleritis and episcleritis of both eyes  -     prednisoLONE acetate (PRED FORTE) 1 % DrpS; Place 1 drop into both eyes 4 (four) times daily. for 10 days  Dispense: 10 mL; Refill: 0  -     Ambulatory referral/consult to Ophthalmology; Future; Expected date: 03/07/2025           Encounter Medications[1]     Review of patient's allergies indicates:   Allergen Reactions    Mucinex fast-max dm nightshift [triprolidine-dm-acetaminoph-gg] Palpitations       Physical Exam  "     Vital Signs  Temp: 97.7 °F (36.5 °C)  Temp Source: Temporal  Pulse: 85  Resp: 16  SpO2: 100 %  BP: 118/80  BP Location: Right arm  Patient Position: Sitting  Pain Score: 0-No pain  Height and Weight  Height: 5' 2" (157.5 cm)  Weight: 60.4 kg (133 lb 2.5 oz)  BSA (Calculated - sq m): 1.63 sq meters  BMI (Calculated): 24.3  Weight in (lb) to have BMI = 25: 136.4]    Physical Exam    General: No acute distress. Well-developed. Well-nourished.  Eyes: EOMI. Sclerae anicteric. Mild erythema in both sclera and episclera  HENT: Normocephalic. Atraumatic. Nares patent. Moist oral mucosa.  Ears: Bilateral TMs clear. Bilateral EACs clear.  Cardiovascular: Regular rate. Regular rhythm. No murmurs. No rubs. No gallops. Normal S1, S2.  Respiratory: Normal respiratory effort. Clear to auscultation bilaterally. No rales. No rhonchi. No wheezing.  Abdomen: Soft. Non-tender. Non-distended. Normoactive bowel sounds.  Musculoskeletal: No  obvious deformity.  Extremities: No lower extremity edema.  Neurological: Alert & oriented x3. No slurred speech. Normal gait.  Psychiatric: Normal mood. Normal affect. Good insight. Good judgment.  Skin: Warm. Dry. No rash.          Laboratory:  CBC:  No results for input(s): "WBC", "RBC", "HGB", "HCT", "PLT", "MCV", "MCH", "MCHC" in the last 2160 hours.  CMP:  No results for input(s): "GLU", "CALCIUM", "ALBUMIN", "PROT", "NA", "K", "CO2", "CL", "BUN", "ALKPHOS", "ALT", "AST", "BILITOT" in the last 2160 hours.    Invalid input(s): "CREATININ"  URINALYSIS:  No results for input(s): "COLORU", "CLARITYU", "SPECGRAV", "PHUR", "PROTEINUA", "GLUCOSEU", "BILIRUBINCON", "BLOODU", "WBCU", "RBCU", "BACTERIA", "MUCUS", "NITRITE", "LEUKOCYTESUR", "UROBILINOGEN", "HYALINECASTS" in the last 2160 hours.   LIPIDS:  No results for input(s): "TSH", "HDL", "CHOL", "TRIG", "LDLCALC", "CHOLHDL", "NONHDLCHOL", "TOTALCHOLEST" in the last 2160 hours.  TSH:  No results for input(s): "TSH" in the last 2160 " "hours.  A1C:  No results for input(s): "HGBA1C" in the last 2160 hours.    Radiology:      Assessment/Plan     Karolyn Campbell is a 44 y.o.female with:    1. Scleritis and episcleritis of both eyes  - prednisoLONE acetate (PRED FORTE) 1 % DrpS; Place 1 drop into both eyes 4 (four) times daily. for 10 days  Dispense: 10 mL; Refill: 0  - Ambulatory referral/consult to Ophthalmology; Future    Assessment & Plan    - Assessed the patient's condition as a combination of allergic conjunctivitis and scleritis based on symptoms and lack of response to antihistamines and antibiotics.  - Ruled out infection due to lack of improvement with antibiotics and duration of symptoms.  - Discontinued antibiotic eye drops due to lack of efficacy and potential irritation.  - Discontinued OTC allergy eye drops.  - Prescribed topical steroid eye drops to be used every 4-6 hours, minimum 4 times daily.  - Explained the difference between scleritis and glaucoma symptoms.  - Instructed the patient to contact the office if symptoms do not improve within 7 days of using the topical steroid eye drops.  - Recommend continuing Xyzal for allergies.  - Advised using a blue light filter for computer screens.  - Confirmed that the patient's vision is slightly affected, with reports of blurry vision when looking at computer screens and heavy eyelids with frequent blinking.  - Recommend using a blue light filter for computer screens.    OPTIC NERVE DAMAGE AND GLAUCOMA CONCERNS:  - Noted the patient's history of potential optic nerve damage and glaucoma concerns from a screening a few years ago.  - Determined current symptoms are likely unrelated to previous optic nerve damage or glaucoma concerns.    This note was generated with the assistance of ambient listening technology. I attest to having reviewed and edited the generated note for accuracy, though some syntax or spelling errors may persist. Please contact the author of this note for any " clarification.       29 minutes of total time spent on the encounter, which includes face to face time and non-face to face time preparing to see the patient (eg, review of tests), Obtaining and/or reviewing separately obtained history, Documenting clinical information in the electronic or other health record, Independently interpreting results (not separately reported) and communicating results to the patient, or Care coordination (not separately reported).      Cely Camargo MD  Internal Medicine   [facility]                       [1]   Outpatient Encounter Medications as of 2/28/2025   Medication Sig Note Dispense Refill    buPROPion (WELLBUTRIN XL) 300 MG 24 hr tablet Take 1 tablet (300 mg total) by mouth once daily.  90 tablet 0    methylphenidate HCl (RITALIN) 20 MG tablet Take 1 tablet (20 mg total) by mouth 2 (two) times daily.  60 tablet 0    [START ON 3/5/2025] methylphenidate HCl (RITALIN) 20 MG tablet Take 1 tablet (20 mg total) by mouth 2 (two) times daily.  60 tablet 0    ofloxacin (OCUFLOX) 0.3 % ophthalmic solution Place 1 drop into both eyes 4 (four) times daily. 2/28/2025: Only using morning and night. 5 mL 0    valACYclovir (VALTREX) 1000 MG tablet Take 2 tablets at onset of cold sore and repeat in 12 hours for 1 additional dose as need for cold sore outbreak 10/21/2024: Take as needed 30 tablet 3    hydrocortisone (ANUSOL-HC) 2.5 % rectal cream Place rectally 2 (two) times daily. (Patient not taking: Reported on 2/28/2025)  28 g 1    levocetirizine (XYZAL) 5 MG tablet Take 1 tablet (5 mg total) by mouth every evening. (Patient not taking: Reported on 2/28/2025) 2/28/2025: Doing OTC instead 7 tablet 0    prednisoLONE acetate (PRED FORTE) 1 % DrpS Place 1 drop into both eyes 4 (four) times daily. for 10 days  10 mL 0    promethazine (PHENERGAN) 6.25 mg/5 mL syrup Take 10 mLs (12.5 mg total) by mouth nightly as needed for Nausea. (Patient not taking: Reported on  2/28/2025)  90 mL 0     No facility-administered encounter medications on file as of 2/28/2025.

## 2025-03-06 ENCOUNTER — OFFICE VISIT (OUTPATIENT)
Dept: OPHTHALMOLOGY | Facility: CLINIC | Age: 45
End: 2025-03-06
Payer: COMMERCIAL

## 2025-03-06 DIAGNOSIS — H15.003 SCLERITIS AND EPISCLERITIS OF BOTH EYES: ICD-10-CM

## 2025-03-06 DIAGNOSIS — H52.7 REFRACTIVE ERROR: ICD-10-CM

## 2025-03-06 DIAGNOSIS — H15.103 SCLERITIS AND EPISCLERITIS OF BOTH EYES: ICD-10-CM

## 2025-03-06 DIAGNOSIS — B96.89 BACTERIAL CONJUNCTIVITIS OF BOTH EYES: Primary | ICD-10-CM

## 2025-03-06 DIAGNOSIS — H04.123 DRY EYE SYNDROME OF BOTH EYES: ICD-10-CM

## 2025-03-06 DIAGNOSIS — H10.9 BACTERIAL CONJUNCTIVITIS OF BOTH EYES: Primary | ICD-10-CM

## 2025-03-06 PROCEDURE — 1159F MED LIST DOCD IN RCRD: CPT | Mod: CPTII,S$GLB,, | Performed by: OPHTHALMOLOGY

## 2025-03-06 PROCEDURE — 1160F RVW MEDS BY RX/DR IN RCRD: CPT | Mod: CPTII,S$GLB,, | Performed by: OPHTHALMOLOGY

## 2025-03-06 PROCEDURE — 99999 PR PBB SHADOW E&M-EST. PATIENT-LVL III: CPT | Mod: PBBFAC,,, | Performed by: OPHTHALMOLOGY

## 2025-03-06 PROCEDURE — 92014 COMPRE OPH EXAM EST PT 1/>: CPT | Mod: S$GLB,,, | Performed by: OPHTHALMOLOGY

## 2025-03-06 NOTE — PROGRESS NOTES
"Subjective:       Patient ID: Karolyn Campbell is a 44 y.o. female.    Chief Complaint: Concerns About Ocular Health    HPI    Here for urgent eye exam for "Scleritis and episcleritis"    Eye meds: PF TID OU     44 year old female states on 2/18/25 she went to her family medicine   doctor for watery eye irritation, discharge and redness OU. C/o of   excessive blinking  Denies  blurred vision, diplopia, photophobia  Pt used   several otc drops which caused increased irritation.   Last edited by Tyra Headley on 3/6/2025  2:35 PM.             Assessment:       1. Bacterial conjunctivitis of both eyes    2. Dry eye syndrome of both eyes    3. Scleritis and episcleritis of both eyes    4. Refractive error        Plan:       Bacterial conj OU-Resolving on Ofloxacin gtts.  ANGEL OU-Needs AT's.  No evidence of scleritis or episcleritis-Will d/c PF.  RE-No need to give MRx.        Ofloxacin qid OU x 4-5 days, then d/c.  D/c PF OU.  Start AT's OU.  RTC Dr Brown in 6 mos.      "

## 2025-04-02 NOTE — PROGRESS NOTES
"Subjective:       Patient ID: Karolyn Campbell is a 44 y.o. female.    Chief Complaint: Follow-up (3 months F/U)        HPI WITH ASSESSMENT AND PLAN OF CARE:      Patient is a 44-year-old white female with ADHD, stress reaction with anxiety, history of HPV, recurrent cold sores, and chronic allergies that is here today for 3 month follow up.  Wellness exam done 10/21/2024.     ADHD  1st evaluated for ADHD by psychologist in [place] in the 10th grade.    Diagnosed with ADHD predominantly inattentive and compulsive traits - NO access to records  Treated by Dr. Linda Pineda on 05/28/2018 - started Adderall XR 10 mg daily but not tolerated   Changed to Ritalin 10 mg twice daily on 07/13/2018.   Since February 2019, patient has been on the same dose of Ritalin 20 mg twice daily.   Able to focus and complete tasks without side effects.  She reports no problems with sleep.   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  3 post-dated printed prescriptions given to patient due to medication shortage  BP 98/70 (BP Location: Right arm, Patient Position: Sitting)   Pulse 81   Temp 98.1 °F (36.7 °C) (Temporal)   Ht 5' 2" (1.575 m)   Wt 59.5 kg (131 lb 4.5 oz)   LMP 03/22/2025   SpO2 98%   BMI 24.01 kg/m²   Follow up in 3 months.     Stress Reaction  Patient diagnosed in July 2021 with acute stress reaction with increased anxiety and started on Zoloft 25 mg daily.    Increased the Zoloft to 50 mg daily May 2022.   Increased dose with complaints of decreased sexual drive/libido  7/9/2024:  STOPPED the Zoloft 50 mg daily and Started Wellbutrin  mg daily  Sexual side effects resolved but anxiety still present on Wellbutrin 150 mg dose  Increased to 300 mg daily 10/21/2024  Reports Anxiety is controlled and sexual side effects resolved.  Follow up in 3 months.        Recurrent cold sores  takes Valtrex as needed.       Vitals:    04/03/25 1646   BP: 98/70   BP Location: Right arm   Patient " "Position: Sitting   Pulse: 81   Temp: 98.1 °F (36.7 °C)   TempSrc: Temporal   SpO2: 98%   Weight: 59.5 kg (131 lb 4.5 oz)   Height: 5' 2" (1.575 m)         Diagnoses this Encounter:         ICD-10-CM ICD-9-CM   1. Attention deficit hyperactivity disorder (ADHD), combined type  F90.2 314.01   2. 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 Recurrent cold sores  B00.1 054.9       Orders Placed This Encounter    methylphenidate HCl (RITALIN) 20 MG tablet    methylphenidate HCl (RITALIN) 20 MG tablet    methylphenidate HCl (RITALIN) 20 MG tablet        Follow up in about 3 months (around 7/3/2025) for med check.     Patient's Medications   New Prescriptions    No medications on file   Previous Medications    BUPROPION (WELLBUTRIN XL) 300 MG 24 HR TABLET    Take 1 tablet (300 mg total) by mouth once daily.    OMEGA-3 FATTY ACIDS/FISH OIL (FISH OIL-OMEGA-3 FATTY ACIDS) 300-1,000 MG CAPSULE    Take 3 capsules by mouth once daily. Thera-TEARS - omega 2 supplement    VALACYCLOVIR (VALTREX) 1000 MG TABLET    Take 2 tablets at onset of cold sore and repeat in 12 hours for 1 additional dose as need for cold sore outbreak   Modified Medications    Modified Medication Previous Medication    METHYLPHENIDATE HCL (RITALIN) 20 MG TABLET methylphenidate HCl (RITALIN) 20 MG tablet       Take 1 tablet (20 mg total) by mouth 2 (two) times daily.    Take 1 tablet (20 mg total) by mouth 2 (two) times daily.    METHYLPHENIDATE HCL (RITALIN) 20 MG TABLET methylphenidate HCl (RITALIN) 20 MG tablet       Take 1 tablet (20 mg total) by mouth 2 (two) times daily.    Take 1 tablet (20 mg total) by mouth 2 (two) times daily.    METHYLPHENIDATE HCL (RITALIN) 20 MG TABLET methylphenidate HCl (RITALIN) 20 MG tablet       Take 1 tablet (20 mg total) by mouth 2 (two) times daily.    Take 1 tablet (20 mg total) by mouth 2 (two) times daily.   Discontinued Medications    LEVOCETIRIZINE (XYZAL) 5 MG TABLET    Take 1 " tablet (5 mg total) by mouth every evening.         Review of Systems      Objective:        Physical Exam        Past Medical History:   Diagnosis Date    Abnormal Pap smear of cervix     prior to being a pt     ADHD (attention deficit hyperactivity disorder)     Diagnosed by psychologist in [place] in the 10th grade and was on Ritalin from 10th to 12 grade and got off med; she then did not start back on ADHD medication until 2018 with Dr. Linda Pineda here in office    Allergy     Anxiety     Anxiety     Fever blister     HPV (human papilloma virus) infection        Past Surgical History:   Procedure Laterality Date    AUGMENTATION OF BREAST      BREAST SURGERY      breast augmentation in ; and then in  had an implant deflate that needed repair     SECTION      replacement of  breast implant       due to rupture     RHINOPLASTY         Family History   Adopted: Yes   Problem Relation Name Age of Onset    No Known Problems Sister      No Known Problems Brother      No Known Problems Daughter      ADD / ADHD Son      Strabismus Son         Social History     Socioeconomic History    Marital status:    Occupational History    Occupation: works curriculum advisor   Tobacco Use    Smoking status: Former     Current packs/day: 0.00     Average packs/day: 0.3 packs/day for 6.7 years (1.7 ttl pk-yrs)     Types: Cigarettes     Start date: 2003     Quit date: 2009     Years since quitting: 15.5     Passive exposure: Past    Smokeless tobacco: Never   Substance and Sexual Activity    Alcohol use: Yes     Alcohol/week: 0.0 - 1.0 standard drinks of alcohol     Comment: once a month - 1 to 2 drinks per occasion    Drug use: No    Sexual activity: Yes     Partners: Male     Birth control/protection: Partner-Vasectomy   Other Topics Concern    Are you pregnant or think you may be? No    Breast-feeding No   Social History Narrative    .  2 children     Social  Drivers of Health     Financial Resource Strain: Low Risk  (2/18/2025)    Overall Financial Resource Strain (CARDIA)     Difficulty of Paying Living Expenses: Not hard at all   Food Insecurity: No Food Insecurity (2/18/2025)    Hunger Vital Sign     Worried About Running Out of Food in the Last Year: Never true     Ran Out of Food in the Last Year: Never true   Transportation Needs: No Transportation Needs (2/18/2025)    PRAPARE - Transportation     Lack of Transportation (Medical): No     Lack of Transportation (Non-Medical): No   Physical Activity: Insufficiently Active (2/18/2025)    Exercise Vital Sign     Days of Exercise per Week: 2 days     Minutes of Exercise per Session: 20 min   Stress: Stress Concern Present (6/6/2022)    [employer] [place] [employer] - Occupational Stress Questionnaire     Feeling of Stress : To some extent   Housing Stability: Low Risk  (2/18/2025)    Housing Stability Vital Sign     Unable to Pay for Housing in the Last Year: No     Number of Times Moved in the Last Year: 0     Homeless in the Last Year: No

## 2025-04-03 ENCOUNTER — OFFICE VISIT (OUTPATIENT)
Dept: FAMILY MEDICINE | Facility: CLINIC | Age: 45
End: 2025-04-03
Payer: COMMERCIAL

## 2025-04-03 VITALS
DIASTOLIC BLOOD PRESSURE: 70 MMHG | WEIGHT: 131.31 LBS | HEIGHT: 62 IN | SYSTOLIC BLOOD PRESSURE: 98 MMHG | BODY MASS INDEX: 24.16 KG/M2 | TEMPERATURE: 98 F | HEART RATE: 81 BPM | OXYGEN SATURATION: 98 %

## 2025-04-03 DIAGNOSIS — B00.1 RECURRENT COLD SORES: ICD-10-CM

## 2025-04-03 DIAGNOSIS — F43.0 STRESS REACTION CAUSING MIXED DISTURBANCE OF EMOTION AND CONDUCT: ICD-10-CM

## 2025-04-03 DIAGNOSIS — F90.2 ATTENTION DEFICIT HYPERACTIVITY DISORDER (ADHD), COMBINED TYPE: Primary | ICD-10-CM

## 2025-04-03 PROCEDURE — 3008F BODY MASS INDEX DOCD: CPT | Mod: CPTII,S$GLB,, | Performed by: NURSE PRACTITIONER

## 2025-04-03 PROCEDURE — 99214 OFFICE O/P EST MOD 30 MIN: CPT | Mod: S$GLB,,, | Performed by: NURSE PRACTITIONER

## 2025-04-03 PROCEDURE — 1160F RVW MEDS BY RX/DR IN RCRD: CPT | Mod: CPTII,S$GLB,, | Performed by: NURSE PRACTITIONER

## 2025-04-03 PROCEDURE — 1159F MED LIST DOCD IN RCRD: CPT | Mod: CPTII,S$GLB,, | Performed by: NURSE PRACTITIONER

## 2025-04-03 PROCEDURE — 3078F DIAST BP <80 MM HG: CPT | Mod: CPTII,S$GLB,, | Performed by: NURSE PRACTITIONER

## 2025-04-03 PROCEDURE — 3074F SYST BP LT 130 MM HG: CPT | Mod: CPTII,S$GLB,, | Performed by: NURSE PRACTITIONER

## 2025-04-03 PROCEDURE — 99999 PR PBB SHADOW E&M-EST. PATIENT-LVL III: CPT | Mod: PBBFAC,,, | Performed by: NURSE PRACTITIONER

## 2025-04-03 RX ORDER — METHYLPHENIDATE HYDROCHLORIDE 20 MG/1
20 TABLET ORAL 2 TIMES DAILY
Qty: 60 TABLET | Refills: 0 | Status: SHIPPED | OUTPATIENT
Start: 2025-04-03

## 2025-04-03 RX ORDER — METHYLPHENIDATE HYDROCHLORIDE 20 MG/1
20 TABLET ORAL 2 TIMES DAILY
Qty: 60 TABLET | Refills: 0 | Status: SHIPPED | OUTPATIENT
Start: 2025-05-02

## 2025-04-03 RX ORDER — METHYLPHENIDATE HYDROCHLORIDE 20 MG/1
20 TABLET ORAL 2 TIMES DAILY
Qty: 60 TABLET | Refills: 0 | Status: SHIPPED | OUTPATIENT
Start: 2025-06-02

## 2025-04-03 RX ORDER — MULTIVITAMIN WITH IRON
3 TABLET ORAL DAILY
COMMUNITY

## 2025-05-14 ENCOUNTER — OFFICE VISIT (OUTPATIENT)
Dept: OBSTETRICS AND GYNECOLOGY | Facility: CLINIC | Age: 45
End: 2025-05-14
Payer: COMMERCIAL

## 2025-05-14 VITALS
BODY MASS INDEX: 22.31 KG/M2 | HEIGHT: 62 IN | DIASTOLIC BLOOD PRESSURE: 72 MMHG | WEIGHT: 121.25 LBS | SYSTOLIC BLOOD PRESSURE: 114 MMHG

## 2025-05-14 DIAGNOSIS — Z12.4 ENCOUNTER FOR SCREENING FOR CERVICAL CANCER: ICD-10-CM

## 2025-05-14 DIAGNOSIS — Z01.419 ENCOUNTER FOR GYNECOLOGICAL EXAMINATION: Primary | ICD-10-CM

## 2025-05-14 DIAGNOSIS — Z11.51 ENCOUNTER FOR SCREENING FOR HUMAN PAPILLOMAVIRUS (HPV): ICD-10-CM

## 2025-05-14 DIAGNOSIS — Z12.31 BREAST CANCER SCREENING BY MAMMOGRAM: ICD-10-CM

## 2025-05-14 PROCEDURE — 1159F MED LIST DOCD IN RCRD: CPT | Mod: CPTII,S$GLB,, | Performed by: OBSTETRICS & GYNECOLOGY

## 2025-05-14 PROCEDURE — 87624 HPV HI-RISK TYP POOLED RSLT: CPT | Performed by: OBSTETRICS & GYNECOLOGY

## 2025-05-14 PROCEDURE — 3078F DIAST BP <80 MM HG: CPT | Mod: CPTII,S$GLB,, | Performed by: OBSTETRICS & GYNECOLOGY

## 2025-05-14 PROCEDURE — 99396 PREV VISIT EST AGE 40-64: CPT | Mod: S$GLB,,, | Performed by: OBSTETRICS & GYNECOLOGY

## 2025-05-14 PROCEDURE — 99999 PR PBB SHADOW E&M-EST. PATIENT-LVL III: CPT | Mod: PBBFAC,,, | Performed by: OBSTETRICS & GYNECOLOGY

## 2025-05-14 PROCEDURE — 3074F SYST BP LT 130 MM HG: CPT | Mod: CPTII,S$GLB,, | Performed by: OBSTETRICS & GYNECOLOGY

## 2025-05-14 PROCEDURE — 99459 PELVIC EXAMINATION: CPT | Mod: ,,, | Performed by: OBSTETRICS & GYNECOLOGY

## 2025-05-14 PROCEDURE — 3008F BODY MASS INDEX DOCD: CPT | Mod: CPTII,S$GLB,, | Performed by: OBSTETRICS & GYNECOLOGY

## 2025-05-14 NOTE — PROGRESS NOTES
Chief Complaint: well woman exam  Well Woman (Start of last menses:  approx./Last pap + hpv: 2022 (normal) (neg)./Mammogram: 2024 birads: 1 neg TC: 9.88%/)    She is established    Karolyn Campbell is a 44 y.o. female  presents for a well woman exam.    Patient here for annual exam.  No gynecological complaints.  Son is 15 plays tennis and daughter is 11 and does competitive dance     ROS:  No abdominal pain. No vaginal discharge  No breast pain or masses, No rectal bleeding     BC vasectomy  Cycles:  regular and monthly occurring every 26-28 days.  LMP: Patient's last menstrual period was 2025 (approximate).      Last pap + hpv: 2022 (normal) (neg)./  Mammogram: 2024 birads: 1 neg TC: 9.88%/  Scheduled for colonoscopy this  with GI doc    Past Medical History:   Diagnosis Date    Abnormal Pap smear of cervix     prior to being a pt     ADHD (attention deficit hyperactivity disorder)     Diagnosed by psychologist in [place] in the 10th grade and was on Ritalin from 10th to 12 grade and got off med; she then did not start back on ADHD medication until 2018 with Dr. Linda Pineda here in office    Allergy     Anxiety     Anxiety     Fever blister     HPV (human papilloma virus) infection        Past Surgical History:   Procedure Laterality Date    AUGMENTATION OF BREAST      BREAST SURGERY      breast augmentation in ; and then in  had an implant deflate that needed repair     SECTION      replacement of  breast implant       due to rupture     RHINOPLASTY         OB History    Para Term  AB Living   4 4 2 0 0 2   SAB IAB Ectopic Multiple Live Births   0 0 0 0 2      # Outcome Date GA Lbr Germain/2nd Weight Sex Type Anes PTL Lv   4 Para 2014    F CS-LTranv   MAX   3 Para 2010    M CS-LTranv   MAX   2 Term            1 Term                Family History   Adopted: Yes   Problem Relation Name Age of Onset    No Known  "Problems Sister      No Known Problems Brother      No Known Problems Daughter      ADD / ADHD Son      Strabismus Son         Social History[1]        Physical Exam:  /72 (BP Location: Left arm, Patient Position: Sitting)   Ht 5' 2" (1.575 m)   Wt 55 kg (121 lb 4.1 oz)   LMP 04/20/2025 (Approximate)   BMI 22.18 kg/m²     APPEARANCE: Well nourished, well developed, in no acute distress.  AFFECT: WNL, alert and oriented x 3  SKIN: No acne or hirsutism  BREASTS: Symmetrical, no skin changes.                      No nipple discharge.   No palpable masses bilaterally  NODES: No inguinal nor axillary LAD  ABDOMEN: soft Non tender Non distended No masses  PELVIC: Female chaperone was present in the room during pelvic exam.  Normal external genitalia without lesions.  Normal hair distribution.   Adequate perineal body, normal urethral meatus.   No signif cystocele or rectocele.  Vagina moist and well rugated without lesions or discharge.    Cervix pink, without lesions, discharge or tenderness.     PAP performed   Bimanual exam shows uterus to be normal size, regular, mobile and nontender.    Adnexa without masses or tenderness.    EXTREMITIES: No edema.        ASSESSMENT AND PLAN  Karolyn was seen today for well woman.    Diagnoses and all orders for this visit:    Encounter for gynecological examination    Encounter for screening for cervical cancer  -     Liquid-Based Pap Smear, Screening    Encounter for screening for human papillomavirus (HPV)  -     Liquid-Based Pap Smear, Screening    Breast cancer screening by mammogram  -     Mammo Digital Screening Bilat w/ Mark (XPD); Future      Encounter for well woman exam with routine gynecological exam      -PAP performed today  -normal exam   -MMG UTD next due November 2025         -  BP normotensive              -  Contraception:  Vasectomy          Follow up in about 1 year (around 5/14/2026) for annual.     Patient was counseled today on A.C.S. Pap guidelines and " recommendations for yearly pelvic exams, mammograms and monthly self breast exams; to see her PCP for other health maintenance.     Patient encouraged to register for portal and results will be sent via portal.       Health Maintenance   Topic Date Due    Colorectal Cancer Screening  Never done    Cervical Cancer Screening  07/11/2025    COVID-19 Vaccine (4 - 2024-25 season) 10/21/2025 (Originally 9/1/2024)    Mammogram  11/27/2025    Lipid Panel  10/16/2029    TETANUS VACCINE  07/15/2031    RSV Vaccine (Age 60+ and Pregnant patients) (1 - 1-dose 75+ series) 09/04/2054    Hepatitis C Screening  Completed    Influenza Vaccine  Completed    HIV Screening  Completed    Pneumococcal Vaccines (Age 0-49)  Aged Out              [1]   Social History  Tobacco Use    Smoking status: Former     Current packs/day: 0.00     Average packs/day: 0.3 packs/day for 6.7 years (1.7 ttl pk-yrs)     Types: Cigarettes     Start date: 1/1/2003     Quit date: 9/1/2009     Years since quitting: 15.7     Passive exposure: Past    Smokeless tobacco: Never   Substance Use Topics    Alcohol use: Yes     Alcohol/week: 0.0 - 1.0 standard drinks of alcohol     Comment: once a month - 1 to 2 drinks per occasion    Drug use: No

## 2025-05-20 ENCOUNTER — RESULTS FOLLOW-UP (OUTPATIENT)
Dept: OBSTETRICS AND GYNECOLOGY | Facility: CLINIC | Age: 45
End: 2025-05-20

## 2025-05-20 NOTE — PROGRESS NOTES
Please reach out to pt and make sure pt got my message and please schedule  repeat pap in 6 months

## 2025-06-17 ENCOUNTER — OFFICE VISIT (OUTPATIENT)
Dept: FAMILY MEDICINE | Facility: CLINIC | Age: 45
End: 2025-06-17
Payer: COMMERCIAL

## 2025-06-17 VITALS
HEART RATE: 82 BPM | SYSTOLIC BLOOD PRESSURE: 100 MMHG | HEIGHT: 62 IN | TEMPERATURE: 98 F | WEIGHT: 114.75 LBS | BODY MASS INDEX: 21.12 KG/M2 | DIASTOLIC BLOOD PRESSURE: 72 MMHG | OXYGEN SATURATION: 98 %

## 2025-06-17 DIAGNOSIS — F90.2 ATTENTION DEFICIT HYPERACTIVITY DISORDER (ADHD), COMBINED TYPE: Primary | ICD-10-CM

## 2025-06-17 DIAGNOSIS — F43.0 STRESS REACTION CAUSING MIXED DISTURBANCE OF EMOTION AND CONDUCT: ICD-10-CM

## 2025-06-17 DIAGNOSIS — B00.1 RECURRENT COLD SORES: ICD-10-CM

## 2025-06-17 PROCEDURE — 99999 PR PBB SHADOW E&M-EST. PATIENT-LVL IV: CPT | Mod: PBBFAC,,, | Performed by: NURSE PRACTITIONER

## 2025-06-17 PROCEDURE — 1159F MED LIST DOCD IN RCRD: CPT | Mod: CPTII,S$GLB,, | Performed by: NURSE PRACTITIONER

## 2025-06-17 PROCEDURE — 3008F BODY MASS INDEX DOCD: CPT | Mod: CPTII,S$GLB,, | Performed by: NURSE PRACTITIONER

## 2025-06-17 PROCEDURE — 3078F DIAST BP <80 MM HG: CPT | Mod: CPTII,S$GLB,, | Performed by: NURSE PRACTITIONER

## 2025-06-17 PROCEDURE — 3074F SYST BP LT 130 MM HG: CPT | Mod: CPTII,S$GLB,, | Performed by: NURSE PRACTITIONER

## 2025-06-17 PROCEDURE — 1160F RVW MEDS BY RX/DR IN RCRD: CPT | Mod: CPTII,S$GLB,, | Performed by: NURSE PRACTITIONER

## 2025-06-17 PROCEDURE — 99213 OFFICE O/P EST LOW 20 MIN: CPT | Mod: S$GLB,,, | Performed by: NURSE PRACTITIONER

## 2025-06-17 RX ORDER — DAPSONE GEL, 5% 75 MG/G
GEL TOPICAL
COMMUNITY
End: 2025-06-18

## 2025-06-17 RX ORDER — METHYLPHENIDATE HYDROCHLORIDE 20 MG/1
TABLET ORAL
Qty: 60 TABLET | Refills: 0 | Status: SHIPPED | OUTPATIENT
Start: 2025-08-31 | End: 2025-06-17 | Stop reason: SDUPTHER

## 2025-06-17 RX ORDER — METHYLPHENIDATE HYDROCHLORIDE 20 MG/1
TABLET ORAL
Qty: 60 TABLET | Refills: 0 | Status: SHIPPED | OUTPATIENT
Start: 2025-08-31

## 2025-06-17 RX ORDER — METHYLPHENIDATE HYDROCHLORIDE 20 MG/1
20 TABLET ORAL 2 TIMES DAILY
Qty: 60 TABLET | Refills: 0 | Status: SHIPPED | OUTPATIENT
Start: 2025-07-03

## 2025-06-17 RX ORDER — METHYLPHENIDATE HYDROCHLORIDE 20 MG/1
20 TABLET ORAL 2 TIMES DAILY
Qty: 60 TABLET | Refills: 0 | Status: SHIPPED | OUTPATIENT
Start: 2025-07-03 | End: 2025-06-17 | Stop reason: SDUPTHER

## 2025-06-17 RX ORDER — METHYLPHENIDATE HYDROCHLORIDE 20 MG/1
20 TABLET ORAL 2 TIMES DAILY
Qty: 60 TABLET | Refills: 0 | Status: SHIPPED | OUTPATIENT
Start: 2025-08-01 | End: 2025-06-17 | Stop reason: SDUPTHER

## 2025-06-17 RX ORDER — METHYLPHENIDATE HYDROCHLORIDE 20 MG/1
20 TABLET ORAL 2 TIMES DAILY
Qty: 60 TABLET | Refills: 0 | Status: SHIPPED | OUTPATIENT
Start: 2025-08-01

## 2025-06-17 NOTE — PROGRESS NOTES
"Subjective:       Patient ID: Karolyn Campbell is a 44 y.o. female.    Chief Complaint: Follow-up (3 months F/U)        HPI WITH ASSESSMENT AND PLAN OF CARE:      Patient is a 44-year-old white female with ADHD, stress reaction with anxiety, history of HPV, recurrent cold sores, and chronic allergies that is here today for 3 month follow up.  Wellness exam done 10/21/2024.     ADHD  1st evaluated for ADHD by psychologist in [place] in the 10th grade.    Diagnosed with ADHD predominantly inattentive and compulsive traits - NO access to records  Treated by Dr. Linda Pineda on 05/28/2018 - started Adderall XR 10 mg daily but not tolerated   Changed to Ritalin 10 mg twice daily on 07/13/2018.   Since February 2019, patient has been on the same dose of Ritalin 20 mg twice daily.   Able to focus and complete tasks without side effects.  She reports no problems with sleep.   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  3 post-dated printed prescriptions given to patient   /72 (BP Location: Left arm, Patient Position: Sitting)   Pulse 82   Temp 98.2 °F (36.8 °C) (Temporal)   Ht 5' 2" (1.575 m)   Wt 52 kg (114 lb 12 oz)   LMP 06/14/2025   SpO2 98%   BMI 20.99 kg/m²   Follow up in 3 months.       Stress Reaction  Patient diagnosed in July 2021 with acute stress reaction with increased anxiety and started on Zoloft 25 mg daily.    Increased the Zoloft to 50 mg daily May 2022.   Increased dose with complaints of decreased sexual drive/libido  7/9/2024:  STOPPED the Zoloft 50 mg daily and Started Wellbutrin  mg daily  Sexual side effects resolved but anxiety still present on Wellbutrin 150 mg dose  Increased to 300 mg daily 10/21/2024  Reports Anxiety is controlled and sexual side effects resolved.  Follow up in 3 months.        Recurrent cold sores  takes Valtrex as needed.          Vitals:    06/17/25 1355   BP: 100/72   BP Location: Left arm   Patient Position: Sitting   Pulse: 82 " "  Temp: 98.2 °F (36.8 °C)   TempSrc: Temporal   SpO2: 98%   Weight: 52 kg (114 lb 12 oz)   Height: 5' 2" (1.575 m)         Diagnoses this Encounter:         ICD-10-CM ICD-9-CM   1. Attention deficit hyperactivity disorder (ADHD), combined type  F90.2 314.01   2. 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 Recurrent cold sores  B00.1 054.9       Orders Placed This Encounter    methylphenidate HCl (RITALIN) 20 MG tablet    methylphenidate HCl (RITALIN) 20 MG tablet    methylphenidate HCl (RITALIN) 20 MG tablet    buPROPion (WELLBUTRIN XL) 300 MG 24 hr tablet        Follow up in about 3 months (around 9/17/2025) for 3 months med check.     Patient's Medications   New Prescriptions    No medications on file   Previous Medications    OMEGA-3 FATTY ACIDS/FISH OIL (FISH OIL-OMEGA-3 FATTY ACIDS) 300-1,000 MG CAPSULE    Take 3 capsules by mouth once daily. Thera-TEARS - omega 2 supplement    VALACYCLOVIR (VALTREX) 1000 MG TABLET    Take 2 tablets at onset of cold sore and repeat in 12 hours for 1 additional dose as need for cold sore outbreak   Modified Medications    Modified Medication Previous Medication    BUPROPION (WELLBUTRIN XL) 300 MG 24 HR TABLET buPROPion (WELLBUTRIN XL) 300 MG 24 hr tablet       Take 1 tablet (300 mg total) by mouth once daily.    Take 1 tablet (300 mg total) by mouth once daily.    METHYLPHENIDATE HCL (RITALIN) 20 MG TABLET methylphenidate HCl (RITALIN) 20 MG tablet       Take 1 tablet (20 mg total) by mouth 2 (two) times daily.    Take 1 tablet (20 mg total) by mouth 2 (two) times daily.    METHYLPHENIDATE HCL (RITALIN) 20 MG TABLET methylphenidate HCl (RITALIN) 20 MG tablet       Take 1 tablet (20 mg total) by mouth 2 (two) times daily.    Take 1 tablet (20 mg total) by mouth 2 (two) times daily.    METHYLPHENIDATE HCL (RITALIN) 20 MG TABLET methylphenidate HCl (RITALIN) 20 MG tablet       Take 1 tablet by mouth twice daily.    Take 1 tablet by mouth twice " daily.   Discontinued Medications    DAPSONE 7.5 % GEL             Review of Systems   HENT: Negative.     Respiratory: Negative.     Cardiovascular: Negative.          Objective:        Physical Exam  Constitutional:       General: She is not in acute distress.     Appearance: Normal appearance. She is normal weight. She is not toxic-appearing or diaphoretic.      Comments: Body mass index is 20.99 kg/m².       Cardiovascular:      Rate and Rhythm: Normal rate and regular rhythm.      Heart sounds: Normal heart sounds.   Pulmonary:      Effort: Pulmonary effort is normal. No respiratory distress.      Breath sounds: Normal breath sounds.   Neurological:      Mental Status: She is alert and oriented to person, place, and time.   Psychiatric:         Mood and Affect: Mood normal.         Behavior: Behavior normal.             Past Medical History:   Diagnosis Date    Abnormal Pap smear of cervix     prior to being a pt     ADHD (attention deficit hyperactivity disorder)     Diagnosed by psychologist in [place] in the 10th grade and was on Ritalin from 10th to 12 grade and got off med; she then did not start back on ADHD medication until 2018 with Dr. Linda Pineda here in office    Allergy     Anxiety     Anxiety     Fever blister     HPV (human papilloma virus) infection        Past Surgical History:   Procedure Laterality Date    AUGMENTATION OF BREAST      BREAST SURGERY      breast augmentation in ; and then in  had an implant deflate that needed repair     SECTION      replacement of  breast implant       due to rupture     RHINOPLASTY  2007       Family History   Adopted: Yes   Problem Relation Name Age of Onset    No Known Problems Sister      No Known Problems Brother      No Known Problems Daughter      ADD / ADHD Son      Strabismus Son         Social History     Socioeconomic History    Marital status:    Occupational History    Occupation: works  curriculum advisor   Tobacco Use    Smoking status: Former     Current packs/day: 0.00     Average packs/day: 0.3 packs/day for 6.7 years (1.7 ttl pk-yrs)     Types: Cigarettes     Start date: 1/1/2003     Quit date: 9/1/2009     Years since quitting: 15.8     Passive exposure: Past    Smokeless tobacco: Never   Substance and Sexual Activity    Alcohol use: Yes     Alcohol/week: 0.0 - 1.0 standard drinks of alcohol     Comment: once a month - 1 to 2 drinks per occasion    Drug use: No    Sexual activity: Yes     Partners: Male     Birth control/protection: Partner-Vasectomy   Other Topics Concern    Are you pregnant or think you may be? No    Breast-feeding No   Social History Narrative    .  2 children     Social Drivers of Health     Financial Resource Strain: Low Risk  (2/18/2025)    Overall Financial Resource Strain (CARDIA)     Difficulty of Paying Living Expenses: Not hard at all   Food Insecurity: No Food Insecurity (2/18/2025)    Hunger Vital Sign     Worried About Running Out of Food in the Last Year: Never true     Ran Out of Food in the Last Year: Never true   Transportation Needs: No Transportation Needs (2/18/2025)    PRAPARE - Transportation     Lack of Transportation (Medical): No     Lack of Transportation (Non-Medical): No   Physical Activity: Insufficiently Active (2/18/2025)    Exercise Vital Sign     Days of Exercise per Week: 2 days     Minutes of Exercise per Session: 20 min   Stress: Stress Concern Present (6/6/2022)    [employer] [place] [employer] - Occupational Stress Questionnaire     Feeling of Stress : To some extent   Housing Stability: Low Risk  (2/18/2025)    Housing Stability Vital Sign     Unable to Pay for Housing in the Last Year: No     Number of Times Moved in the Last Year: 0     Homeless in the Last Year: No

## 2025-06-18 RX ORDER — BUPROPION HYDROCHLORIDE 300 MG/1
300 TABLET ORAL DAILY
Qty: 90 TABLET | Refills: 0 | Status: SHIPPED | OUTPATIENT
Start: 2025-06-18

## 2025-06-24 ENCOUNTER — CLINICAL SUPPORT (OUTPATIENT)
Dept: AUDIOLOGY | Facility: CLINIC | Age: 45
End: 2025-06-24
Payer: COMMERCIAL

## 2025-06-24 ENCOUNTER — OFFICE VISIT (OUTPATIENT)
Dept: OTOLARYNGOLOGY | Facility: CLINIC | Age: 45
End: 2025-06-24
Payer: COMMERCIAL

## 2025-06-24 VITALS — HEART RATE: 79 BPM | SYSTOLIC BLOOD PRESSURE: 116 MMHG | DIASTOLIC BLOOD PRESSURE: 77 MMHG

## 2025-06-24 DIAGNOSIS — H93.293 ABNORMAL AUDITORY PERCEPTION OF BOTH EARS: Primary | ICD-10-CM

## 2025-06-24 DIAGNOSIS — H93.8X2 SENSATION OF FULLNESS IN EAR, LEFT: Primary | ICD-10-CM

## 2025-06-24 PROCEDURE — 92567 TYMPANOMETRY: CPT | Mod: S$GLB,,,

## 2025-06-24 PROCEDURE — 99204 OFFICE O/P NEW MOD 45 MIN: CPT | Mod: S$GLB,,, | Performed by: OTOLARYNGOLOGY

## 2025-06-24 PROCEDURE — 3078F DIAST BP <80 MM HG: CPT | Mod: CPTII,S$GLB,, | Performed by: OTOLARYNGOLOGY

## 2025-06-24 PROCEDURE — 1159F MED LIST DOCD IN RCRD: CPT | Mod: CPTII,S$GLB,, | Performed by: OTOLARYNGOLOGY

## 2025-06-24 PROCEDURE — 92557 COMPREHENSIVE HEARING TEST: CPT | Mod: S$GLB,,,

## 2025-06-24 PROCEDURE — 99999 PR PBB SHADOW E&M-EST. PATIENT-LVL I: CPT | Mod: PBBFAC,,,

## 2025-06-24 PROCEDURE — 3074F SYST BP LT 130 MM HG: CPT | Mod: CPTII,S$GLB,, | Performed by: OTOLARYNGOLOGY

## 2025-06-24 PROCEDURE — 99999 PR PBB SHADOW E&M-EST. PATIENT-LVL III: CPT | Mod: PBBFAC,,, | Performed by: OTOLARYNGOLOGY

## 2025-06-24 NOTE — PROGRESS NOTES
"Subjective:       Patient ID: Karolyn Campbell is a 44 y.o. female.    Chief Complaint: Ear Fullness (Patient describes "blockage" in left ear with decreased hearing in that ear.) and Hearing Loss    History of Present Illness    CHIEF COMPLAINT:  Patient presents today with left ear fullness and pressure sensation.    ENT SYMPTOMS:  She reports ongoing left ear symptoms for the past couple of months with increased frequency recently. She describes a persistent sensation of ear fullness, similar to being unable to equalize pressure during air travel. She experiences intermittent auditory symptoms characterized by an internal voice echo and a feeling of ear congestion. Symptoms fluctuate, with periods of clarity alternating with symptomatic episodes. She denies ear pain. Symptoms occur without consistent pattern throughout the day. She uses Q-tips regularly. She reports eye issues with drainage several months ago, though details are unclear regarding potential connection to current symptoms.    WEIGHT MANAGEMENT:  She reports intentional weight loss of 10 lbs since the beginning of the year following the [employer] weight loss plan, which consists of five bars and one lean and green meal in the evening. She notes previous success with this plan but acknowledges inconsistent adherence in the past.    MEDICATIONS:  She has maintained the same dose of Ritalin for 5 years. She recently started fish oil supplements for eye-related concerns and takes antiviral medication as needed for occasional fever blisters.    MEDICAL HISTORY:  She was last seen by Dr. Álvarez between [id number].      ROS:  General: -fever, -chills, -fatigue, -weight gain, +weight loss  Eyes: -vision changes, -redness, +discharge  ENT: -ear pain, -nasal congestion, -sore throat, +ear pressure  Cardiovascular: -chest pain, -palpitations, -lower extremity edema  Respiratory: +cough, -shortness of breath  Gastrointestinal: -abdominal pain, -nausea, -vomiting, " -diarrhea, -constipation, -blood in stool  Genitourinary: -dysuria, -hematuria, -frequency  Musculoskeletal: -joint pain, -muscle pain  Skin: -rash, -lesion  Neurological: -headache, -dizziness, -numbness, -tingling  Psychiatric: -anxiety, -depression, -sleep difficulty         Objective:      Physical Exam    General: No acute distress. Well-developed. Well-nourished.  Eyes: EOMI. Sclerae anicteric.  HENT: Normocephalic. Atraumatic. Nares patent. Moist oral mucosa.  Ears: Bilateral TMs clear. Bilateral EACs clear.  Musculoskeletal: No  obvious deformity.  Extremities: No lower extremity edema.  Neurological: Alert & oriented x3. No slurred speech. Normal gait.  Psychiatric: Normal mood. Normal affect. Good insight. Good judgment.  Skin: Warm. Dry. No rash.             As a result of this patients history and examination findings, a comprehensive audiogram was ordered to determine the level of hearing/hearing loss.     Normal and symmetrical hearing with excellent discrimination scores and Type-A tympanograms.         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  - Conducted audiogram, showing normal results bilaterally across all frequencies.  - Evaluated tympanogram, confirming normal middle ear pressure and eardrum mobility.  - Recent 10 lb weight loss potential cause of intermittent ear fullness sensation, possibly due to changes in fat distribution around Eustachian tube.  - Ruled out need for imaging studies (CT, MRI) based on normal exam findings and lack of concerning symptoms.  - Discussed potential link between weight loss and changes in Eustachian tube function.  - Informed patient that symptoms may improve over time as body adjusts to weight loss.    DENTAL EVALUATION:  -  Patient to consider scheduling dental appointment sooner than planned 6-month visit to evaluate for potential dental causes of ear symptoms.  - Recommend dental evaluation to rule out referred ear symptoms from dental issues.    FOLLOW-UP:  - Follow up if symptoms persist or worsen.  - Follow up on a day when ear fullness is more pronounced for targeted exam.         This note was generated with the assistance of ambient listening technology. Verbal consent was obtained by the patient and accompanying visitor(s) for the recording of patient appointment to facilitate this note. I attest to having reviewed and edited the generated note for accuracy, though some syntax or spelling errors may persist. Please contact the author of this note for any clarification.This note was generated with the assistance of ambient listening technology. Verbal consent was obtained by the patient and accompanying visitor(s) for the recording of patient appointment to facilitate this note. I attest to having reviewed and edited the generated note for accuracy, though some syntax or spelling errors may persist. Please contact the author of this note for any clarification.

## 2025-06-24 NOTE — PROGRESS NOTES
Karolyn Campbell, a 44 y.o. female, was seen today in the clinic for an audiologic evaluation. Ms. Campbell reported aural fullness and pressure in her left ear for approximately the last two months. Patient denied tinnitus, otalgia, and dizziness.    Tympanometry revealed Type A tympanogram in the right ear and Type A tympanogram in the left ear. Audiogram results revealed normal hearing sensitivity in the right ear and normal hearing sensitivity in the left ear.  Speech reception thresholds were noted at 15 dBHL in the right ear and 10 dBHL in the left ear.  Speech discrimination scores were 100% in the right ear and 100% in the left ear.    Recommendations:  Otologic evaluation  Repeat audiogram as needed  Hearing protection in noise

## 2025-07-02 ENCOUNTER — PATIENT OUTREACH (OUTPATIENT)
Dept: ADMINISTRATIVE | Facility: HOSPITAL | Age: 45
End: 2025-07-02
Payer: COMMERCIAL